# Patient Record
Sex: MALE | Race: BLACK OR AFRICAN AMERICAN | NOT HISPANIC OR LATINO | Employment: OTHER | URBAN - METROPOLITAN AREA
[De-identification: names, ages, dates, MRNs, and addresses within clinical notes are randomized per-mention and may not be internally consistent; named-entity substitution may affect disease eponyms.]

---

## 2017-02-08 ENCOUNTER — APPOINTMENT (OUTPATIENT)
Dept: LAB | Facility: HOSPITAL | Age: 72
End: 2017-02-08

## 2017-02-08 ENCOUNTER — APPOINTMENT (OUTPATIENT)
Dept: ONCOLOGY | Facility: CLINIC | Age: 72
End: 2017-02-08

## 2017-02-21 ENCOUNTER — OFFICE VISIT (OUTPATIENT)
Dept: ONCOLOGY | Facility: CLINIC | Age: 72
End: 2017-02-21

## 2017-02-21 ENCOUNTER — LAB (OUTPATIENT)
Dept: LAB | Facility: HOSPITAL | Age: 72
End: 2017-02-21

## 2017-02-21 VITALS
BODY MASS INDEX: 29.29 KG/M2 | DIASTOLIC BLOOD PRESSURE: 78 MMHG | WEIGHT: 204.6 LBS | RESPIRATION RATE: 16 BRPM | HEIGHT: 70 IN | TEMPERATURE: 97.7 F | OXYGEN SATURATION: 95 % | HEART RATE: 63 BPM | SYSTOLIC BLOOD PRESSURE: 126 MMHG

## 2017-02-21 DIAGNOSIS — C61 PROSTATE CANCER (HCC): ICD-10-CM

## 2017-02-21 DIAGNOSIS — D37.3 LOW GRADE MUCINOUS NEOPLASM OF APPENDIX: Primary | ICD-10-CM

## 2017-02-21 DIAGNOSIS — C18.1 CANCER OF APPENDIX (HCC): ICD-10-CM

## 2017-02-21 DIAGNOSIS — D37.3 LOW GRADE MUCINOUS NEOPLASM OF APPENDIX: ICD-10-CM

## 2017-02-21 DIAGNOSIS — D63.8 ANEMIA OF CHRONIC DISEASE: ICD-10-CM

## 2017-02-21 LAB
ALBUMIN SERPL-MCNC: 3.9 G/DL (ref 3.5–5.2)
ALBUMIN/GLOB SERPL: 1.4 G/DL (ref 1.1–2.4)
ALP SERPL-CCNC: 67 U/L (ref 38–116)
ALT SERPL W P-5'-P-CCNC: 15 U/L (ref 0–41)
ANION GAP SERPL CALCULATED.3IONS-SCNC: 12.6 MMOL/L
AST SERPL-CCNC: 19 U/L (ref 0–40)
BASOPHILS # BLD AUTO: 0.02 10*3/MM3 (ref 0–0.1)
BASOPHILS NFR BLD AUTO: 0.4 % (ref 0–1.1)
BILIRUB SERPL-MCNC: 0.7 MG/DL (ref 0.1–1.2)
BUN BLD-MCNC: 16 MG/DL (ref 6–20)
BUN/CREAT SERPL: 12 (ref 7.3–30)
CALCIUM SPEC-SCNC: 9.1 MG/DL (ref 8.5–10.2)
CEA SERPL-MCNC: 3.81 NG/ML
CHLORIDE SERPL-SCNC: 105 MMOL/L (ref 98–107)
CO2 SERPL-SCNC: 26.4 MMOL/L (ref 22–29)
CREAT BLD-MCNC: 1.33 MG/DL (ref 0.7–1.3)
DEPRECATED RDW RBC AUTO: 41.6 FL (ref 37–49)
EOSINOPHIL # BLD AUTO: 0.04 10*3/MM3 (ref 0–0.36)
EOSINOPHIL NFR BLD AUTO: 0.8 % (ref 1–5)
ERYTHROCYTE [DISTWIDTH] IN BLOOD BY AUTOMATED COUNT: 14.3 % (ref 11.7–14.5)
GFR SERPL CREATININE-BSD FRML MDRD: 65 ML/MIN/1.73
GLOBULIN UR ELPH-MCNC: 2.7 GM/DL (ref 1.8–3.5)
GLUCOSE BLD-MCNC: 131 MG/DL (ref 74–124)
HCT VFR BLD AUTO: 43.5 % (ref 40–49)
HGB BLD-MCNC: 14.2 G/DL (ref 13.5–16.5)
IMM GRANULOCYTES # BLD: 0.01 10*3/MM3 (ref 0–0.03)
IMM GRANULOCYTES NFR BLD: 0.2 % (ref 0–0.5)
LYMPHOCYTES # BLD AUTO: 1.15 10*3/MM3 (ref 1–3.5)
LYMPHOCYTES NFR BLD AUTO: 22 % (ref 20–49)
MCH RBC QN AUTO: 26.3 PG (ref 27–33)
MCHC RBC AUTO-ENTMCNC: 32.6 G/DL (ref 32–35)
MCV RBC AUTO: 80.6 FL (ref 83–97)
MONOCYTES # BLD AUTO: 0.3 10*3/MM3 (ref 0.25–0.8)
MONOCYTES NFR BLD AUTO: 5.7 % (ref 4–12)
NEUTROPHILS # BLD AUTO: 3.71 10*3/MM3 (ref 1.5–7)
NEUTROPHILS NFR BLD AUTO: 70.9 % (ref 39–75)
NRBC BLD MANUAL-RTO: 0 /100 WBC (ref 0–0)
PLATELET # BLD AUTO: 178 10*3/MM3 (ref 150–375)
PMV BLD AUTO: 10.8 FL (ref 8.9–12.1)
POTASSIUM BLD-SCNC: 4.2 MMOL/L (ref 3.5–4.7)
PROT SERPL-MCNC: 6.6 G/DL (ref 6.3–8)
PSA SERPL-MCNC: 0.65 NG/ML (ref 0–4)
RBC # BLD AUTO: 5.4 10*6/MM3 (ref 4.3–5.5)
SODIUM BLD-SCNC: 144 MMOL/L (ref 134–145)
WBC NRBC COR # BLD: 5.23 10*3/MM3 (ref 4–10)

## 2017-02-21 PROCEDURE — 82378 CARCINOEMBRYONIC ANTIGEN: CPT | Performed by: INTERNAL MEDICINE

## 2017-02-21 PROCEDURE — 84153 ASSAY OF PSA TOTAL: CPT | Performed by: INTERNAL MEDICINE

## 2017-02-21 PROCEDURE — 36415 COLL VENOUS BLD VENIPUNCTURE: CPT

## 2017-02-21 PROCEDURE — 80053 COMPREHEN METABOLIC PANEL: CPT

## 2017-02-21 PROCEDURE — 85025 COMPLETE CBC W/AUTO DIFF WBC: CPT

## 2017-02-21 PROCEDURE — 99214 OFFICE O/P EST MOD 30 MIN: CPT | Performed by: INTERNAL MEDICINE

## 2017-02-21 NOTE — PROGRESS NOTES
"Subjective     CHIEF COMPLAINT:    1. Appendiceal mucinous neoplasm.   2. Anemia.     HISTORY OF PRESENT ILLNESS:     Danni Figueroa is a 71 y.o. patient with the above medical history.  He returns today for follow up reporting no new symptoms.  He denies having abdominal pain, distension, nausea, vomiting or diarrhea.  He is on Prednisone 7.5 mg a day currently for treatment of his polymyalgia rheumatica, which was diagnosed in 2015.      Past Medical History   Diagnosis Date   • Hypertension    • Low grade mucinous neoplasm of appendix    • Prostate carcinoma        Past Surgical History   Procedure Laterality Date   • Appendectomy  1993       Cancer-related family history includes Prostate cancer in his brother.  Social History   Substance Use Topics   • Smoking status: Former Smoker     Packs/day: 1.00     Years: 10.00     Types: Cigarettes   • Smokeless tobacco: Not on file   • Alcohol use No       SCHEDULED MEDS:    Current Outpatient Prescriptions:   •  predniSONE (DELTASONE) 5 MG tablet, Take 5 mg by mouth 3 (three) times a day., Disp: , Rfl:       REVIEW OF SYSTEMS:  GENERAL:  No fever or chills. Intentional weight loss.    SKIN:  No rashes or lesions.  HEME/LYMPH: No anemia, easy bruisability or enlarged lymph nodes.  RESPIRATORY:  No cough, shortness of breath, hemoptysis or wheezing.  CVS:  No chest pain, palpitations or lower extremity swelling.  GI:  No abdominal pain, nausea, vomiting, constipation, diarrhea, melena or hematochezia.  :  No dysuria, hematuria or increased frequency.   MUSCULOSKELETAL:  Muscle pain and weakness improved with prednisone.  NEUROLOGICAL:  No dizziness, global weakness, loss of consciousness or seizures.  PSYCHIATRIC:  No anxiety, mood changes or depression.    Objective   VITAL SIGNS:     Vitals:    02/21/17 1536   BP: 126/78   Pulse: 63   Resp: 16   Temp: 97.7 °F (36.5 °C)   TempSrc: Oral   SpO2: 95%   Weight: 204 lb 9.6 oz (92.8 kg)   Height: 70.08\" (178 " cm)  Comment: new height   PainSc: 0-No pain     Body mass index is 29.29 kg/(m^2).   Wt Readings from Last 3 Encounters:   02/21/17 204 lb 9.6 oz (92.8 kg)   08/24/16 207 lb 6.4 oz (94.1 kg)       PHYSICAL EXAMINATION:  GENERAL:  The patient appears in good general condition, not in acute distress.  SKIN:  No skin rashes or lesions. No ecchymosis or petechiae.  HEAD:  Normocephalic.  NECK:  Supple with good ROM. No thyromegaly or masses.  LYMPHATICS:  No cervical or supraclavicular lymphadenopathy.  CHEST:  Lungs clear to auscultation. No added sounds.  ABDOMEN:  Soft and non-tender. No hepato- or splenomegaly. No masses.  EXTREMITIES:  No cyanosis or edema. No calf tenderness.  NEUROLOGICAL:  No focal neurological deficits.    DIAGNOSTIC DATA:       Results from last 7 days  Lab Units 02/21/17  1525   WBC 10*3/mm3 5.23   NEUTROS ABS 10*3/mm3 3.71   HEMOGLOBIN g/dL 14.2   HEMATOCRIT % 43.5   PLATELETS 10*3/mm3 178       Results from last 7 days  Lab Units 02/21/17  1525   SODIUM mmol/L 144   POTASSIUM mmol/L 4.2   CHLORIDE mmol/L 105   TOTAL CO2 mmol/L 26.4   BUN mg/dL 16   CREATININE mg/dL 1.33*   CALCIUM mg/dL 9.1   ALBUMIN g/dL 3.90   BILIRUBIN mg/dL 0.7   ALK PHOS U/L 67   ALT (SGPT) U/L 15   AST (SGOT) U/L 19   GLUCOSE mg/dL 131*         Lab Results   Component Value Date    CEA 3.2 08/24/2016     Assessment/Plan    1.  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 He had right hemicolectomy on 06/19/2014. His baseline CEA was elevated. He had thickening at the area of the right paracolic gutter concerning for possible local recurrence on his previous CT scans. The CT scan from 01/19/2016 revealed resolution of this finding.  Patient is asymptomatic.  His physical examination was nonrevealing.  CEA remains in the normal range.  It was elevated at the time of diagnosis and will continue to use it for follow-up.  We will continue to see him again in 6 months for the first 3 years and  then starts to see him annually afterwards.      2.  Chronic kidney disease stage III.  We discussed today the need to increase fluid intake..     3.  Prostate cancer diagnosed in 2014.  PSA has slightly increased on today's lab.  He is completely asymptomatic.  We will repeat the PSA in 6 months.     We will see him in follow up in 6 months with a CBC, CMP, CEA and PSA.       Katheryn Haider MD  02/21/17

## 2017-08-02 ENCOUNTER — DOCUMENTATION (OUTPATIENT)
Dept: ONCOLOGY | Facility: CLINIC | Age: 72
End: 2017-08-02

## 2017-08-02 ENCOUNTER — LAB (OUTPATIENT)
Dept: LAB | Facility: HOSPITAL | Age: 72
End: 2017-08-02

## 2017-08-02 DIAGNOSIS — D37.3 LOW GRADE MUCINOUS NEOPLASM OF APPENDIX: ICD-10-CM

## 2017-08-02 DIAGNOSIS — C61 PROSTATE CANCER (HCC): ICD-10-CM

## 2017-08-02 LAB
ALBUMIN SERPL-MCNC: 4 G/DL (ref 3.5–5.2)
ALBUMIN/GLOB SERPL: 1.5 G/DL (ref 1.1–2.4)
ALP SERPL-CCNC: 80 U/L (ref 38–116)
ALT SERPL W P-5'-P-CCNC: 14 U/L (ref 0–41)
ANION GAP SERPL CALCULATED.3IONS-SCNC: 12.2 MMOL/L
AST SERPL-CCNC: 18 U/L (ref 0–40)
BASOPHILS # BLD AUTO: 0.03 10*3/MM3 (ref 0–0.1)
BASOPHILS NFR BLD AUTO: 0.6 % (ref 0–1.1)
BILIRUB SERPL-MCNC: 0.5 MG/DL (ref 0.1–1.2)
BUN BLD-MCNC: 19 MG/DL (ref 6–20)
BUN/CREAT SERPL: 14 (ref 7.3–30)
CALCIUM SPEC-SCNC: 9 MG/DL (ref 8.5–10.2)
CEA SERPL-MCNC: 3.37 NG/ML
CHLORIDE SERPL-SCNC: 104 MMOL/L (ref 98–107)
CO2 SERPL-SCNC: 25.8 MMOL/L (ref 22–29)
CREAT BLD-MCNC: 1.36 MG/DL (ref 0.7–1.3)
DEPRECATED RDW RBC AUTO: 41.3 FL (ref 37–49)
EOSINOPHIL # BLD AUTO: 0.04 10*3/MM3 (ref 0–0.36)
EOSINOPHIL NFR BLD AUTO: 0.7 % (ref 1–5)
ERYTHROCYTE [DISTWIDTH] IN BLOOD BY AUTOMATED COUNT: 14.1 % (ref 11.7–14.5)
GFR SERPL CREATININE-BSD FRML MDRD: 63 ML/MIN/1.73
GLOBULIN UR ELPH-MCNC: 2.6 GM/DL (ref 1.8–3.5)
GLUCOSE BLD-MCNC: 99 MG/DL (ref 74–124)
HCT VFR BLD AUTO: 44.2 % (ref 40–49)
HGB BLD-MCNC: 14.5 G/DL (ref 13.5–16.5)
IMM GRANULOCYTES # BLD: 0.03 10*3/MM3 (ref 0–0.03)
IMM GRANULOCYTES NFR BLD: 0.6 % (ref 0–0.5)
LYMPHOCYTES # BLD AUTO: 1.09 10*3/MM3 (ref 1–3.5)
LYMPHOCYTES NFR BLD AUTO: 20 % (ref 20–49)
MCH RBC QN AUTO: 26.7 PG (ref 27–33)
MCHC RBC AUTO-ENTMCNC: 32.8 G/DL (ref 32–35)
MCV RBC AUTO: 81.3 FL (ref 83–97)
MONOCYTES # BLD AUTO: 0.3 10*3/MM3 (ref 0.25–0.8)
MONOCYTES NFR BLD AUTO: 5.5 % (ref 4–12)
NEUTROPHILS # BLD AUTO: 3.96 10*3/MM3 (ref 1.5–7)
NEUTROPHILS NFR BLD AUTO: 72.6 % (ref 39–75)
NRBC BLD MANUAL-RTO: 0 /100 WBC (ref 0–0)
PLATELET # BLD AUTO: 171 10*3/MM3 (ref 150–375)
PMV BLD AUTO: 10.6 FL (ref 8.9–12.1)
POTASSIUM BLD-SCNC: 4.6 MMOL/L (ref 3.5–4.7)
PROT SERPL-MCNC: 6.6 G/DL (ref 6.3–8)
PSA SERPL-MCNC: 0.48 NG/ML (ref 0–4)
RBC # BLD AUTO: 5.44 10*6/MM3 (ref 4.3–5.5)
SODIUM BLD-SCNC: 142 MMOL/L (ref 134–145)
WBC NRBC COR # BLD: 5.45 10*3/MM3 (ref 4–10)

## 2017-08-02 PROCEDURE — 85025 COMPLETE CBC W/AUTO DIFF WBC: CPT | Performed by: INTERNAL MEDICINE

## 2017-08-02 PROCEDURE — 82378 CARCINOEMBRYONIC ANTIGEN: CPT | Performed by: INTERNAL MEDICINE

## 2017-08-02 PROCEDURE — 80053 COMPREHEN METABOLIC PANEL: CPT | Performed by: INTERNAL MEDICINE

## 2017-08-02 PROCEDURE — 84153 ASSAY OF PSA TOTAL: CPT | Performed by: INTERNAL MEDICINE

## 2017-08-02 PROCEDURE — 36415 COLL VENOUS BLD VENIPUNCTURE: CPT | Performed by: INTERNAL MEDICINE

## 2017-08-02 NOTE — PROGRESS NOTES
Per [place]  patient requesting to have CMP, CEA and PSA drawn today in order for Dr Haider to have lab results on 8/8/17.

## 2017-08-08 ENCOUNTER — LAB (OUTPATIENT)
Dept: LAB | Facility: HOSPITAL | Age: 72
End: 2017-08-08

## 2017-08-08 ENCOUNTER — OFFICE VISIT (OUTPATIENT)
Dept: ONCOLOGY | Facility: CLINIC | Age: 72
End: 2017-08-08

## 2017-08-08 ENCOUNTER — APPOINTMENT (OUTPATIENT)
Dept: LAB | Facility: HOSPITAL | Age: 72
End: 2017-08-08

## 2017-08-08 ENCOUNTER — APPOINTMENT (OUTPATIENT)
Dept: ONCOLOGY | Facility: CLINIC | Age: 72
End: 2017-08-08

## 2017-08-08 VITALS
HEIGHT: 70 IN | TEMPERATURE: 97.4 F | BODY MASS INDEX: 30.01 KG/M2 | DIASTOLIC BLOOD PRESSURE: 84 MMHG | RESPIRATION RATE: 18 BRPM | SYSTOLIC BLOOD PRESSURE: 118 MMHG | OXYGEN SATURATION: 99 % | HEART RATE: 55 BPM | WEIGHT: 209.6 LBS

## 2017-08-08 DIAGNOSIS — C61 PROSTATE CANCER (HCC): ICD-10-CM

## 2017-08-08 DIAGNOSIS — D37.3 LOW GRADE MUCINOUS NEOPLASM OF APPENDIX: Primary | ICD-10-CM

## 2017-08-08 LAB
BASOPHILS # BLD AUTO: 0.03 10*3/MM3 (ref 0–0.1)
BASOPHILS NFR BLD AUTO: 0.5 % (ref 0–1.1)
DEPRECATED RDW RBC AUTO: 41.1 FL (ref 37–49)
EOSINOPHIL # BLD AUTO: 0.06 10*3/MM3 (ref 0–0.36)
EOSINOPHIL NFR BLD AUTO: 1 % (ref 1–5)
ERYTHROCYTE [DISTWIDTH] IN BLOOD BY AUTOMATED COUNT: 14.2 % (ref 11.7–14.5)
HCT VFR BLD AUTO: 43.6 % (ref 40–49)
HGB BLD-MCNC: 14.7 G/DL (ref 13.5–16.5)
IMM GRANULOCYTES # BLD: 0.04 10*3/MM3 (ref 0–0.03)
IMM GRANULOCYTES NFR BLD: 0.7 % (ref 0–0.5)
LYMPHOCYTES # BLD AUTO: 1.35 10*3/MM3 (ref 1–3.5)
LYMPHOCYTES NFR BLD AUTO: 22.7 % (ref 20–49)
MCH RBC QN AUTO: 27 PG (ref 27–33)
MCHC RBC AUTO-ENTMCNC: 33.7 G/DL (ref 32–35)
MCV RBC AUTO: 80 FL (ref 83–97)
MONOCYTES # BLD AUTO: 0.35 10*3/MM3 (ref 0.25–0.8)
MONOCYTES NFR BLD AUTO: 5.9 % (ref 4–12)
NEUTROPHILS # BLD AUTO: 4.13 10*3/MM3 (ref 1.5–7)
NEUTROPHILS NFR BLD AUTO: 69.2 % (ref 39–75)
NRBC BLD MANUAL-RTO: 0 /100 WBC (ref 0–0)
PLATELET # BLD AUTO: 156 10*3/MM3 (ref 150–375)
PMV BLD AUTO: 10.4 FL (ref 8.9–12.1)
RBC # BLD AUTO: 5.45 10*6/MM3 (ref 4.3–5.5)
WBC NRBC COR # BLD: 5.96 10*3/MM3 (ref 4–10)

## 2017-08-08 PROCEDURE — 36416 COLLJ CAPILLARY BLOOD SPEC: CPT | Performed by: INTERNAL MEDICINE

## 2017-08-08 PROCEDURE — 85025 COMPLETE CBC W/AUTO DIFF WBC: CPT | Performed by: INTERNAL MEDICINE

## 2017-08-08 PROCEDURE — 99213 OFFICE O/P EST LOW 20 MIN: CPT | Performed by: INTERNAL MEDICINE

## 2017-08-08 RX ORDER — TADALAFIL 5 MG/1
5 TABLET ORAL DAILY
COMMUNITY

## 2017-08-08 NOTE — PROGRESS NOTES
"Subjective     CHIEF COMPLAINT:    1. Appendiceal mucinous neoplasm.   2. Anemia.     HISTORY OF PRESENT ILLNESS:     Danni Figueroa is a 71 y.o. patient with the above medical history.  He returns today for follow up reporting no new symptoms.  He has polymyalgia rheumatica which is under good control with prednisone currently at 5 mg a day.  He denies having abdominal pain or changes in his bowel movements.    Past Medical History:   Diagnosis Date   • Hypertension    • Low grade mucinous neoplasm of appendix    • Prostate carcinoma        Past Surgical History:   Procedure Laterality Date   • APPENDECTOMY  1993       Cancer-related family history includes Prostate cancer in his brother.  Social History   Substance Use Topics   • Smoking status: Former Smoker     Packs/day: 1.00     Years: 10.00     Types: Cigarettes   • Smokeless tobacco: Not on file   • Alcohol use No       SCHEDULED MEDS:    Current Outpatient Prescriptions:   •  tadalafil (CIALIS) 5 MG tablet, Take 5 mg by mouth Daily As Needed for erectile dysfunction., Disp: , Rfl:   •  predniSONE (DELTASONE) 5 MG tablet, Take 5 mg by mouth Daily., Disp: , Rfl:       REVIEW OF SYSTEMS:  GENERAL:  No fever or chills. Intentional weight loss.    SKIN:  No rashes or lesions.  HEME/LYMPH: No anemia, easy bruisability or enlarged lymph nodes.  GI:  No abdominal pain, nausea, vomiting, constipation, diarrhea, melena or hematochezia.  :  No dysuria, hematuria or increased frequency.   MUSCULOSKELETAL:  Muscle pain and weakness improved with treatment of the polymyalgia rheumatica.      Objective   VITAL SIGNS:     Vitals:    08/08/17 0916   BP: 118/84   Pulse: 55   Resp: 18   Temp: 97.4 °F (36.3 °C)   TempSrc: Oral   SpO2: 99%   Weight: 209 lb 9.6 oz (95.1 kg)   Height: 70.08\" (178 cm)   PainSc: 0-No pain     Body mass index is 30.01 kg/(m^2).   Wt Readings from Last 3 Encounters:   08/08/17 209 lb 9.6 oz (95.1 kg)   02/21/17 204 lb 9.6 oz (92.8 kg)   08/24/16 " 207 lb 6.4 oz (94.1 kg)       PHYSICAL EXAMINATION:  GENERAL:  The patient appears in good general condition, not in acute distress.  SKIN:  No skin rashes or lesions. No ecchymosis or petechiae.  HEAD:  Normocephalic.  NECK:  Supple with good ROM. No thyromegaly or masses.  LYMPHATICS:  No cervical or supraclavicular lymphadenopathy.  CHEST:  Lungs clear to auscultation. No added sounds.  ABDOMEN:  Soft and non-tender. No hepato- or splenomegaly. No masses.      DIAGNOSTIC DATA:       Results from last 7 days  Lab Units 08/08/17  0910 08/02/17  1120   WBC 10*3/mm3 5.96 5.45   NEUTROS ABS 10*3/mm3 4.13 3.96   HEMOGLOBIN g/dL 14.7 14.5   HEMATOCRIT % 43.6 44.2   PLATELETS 10*3/mm3 156 171       Results from last 7 days  Lab Units 08/02/17  1120   SODIUM mmol/L 142   POTASSIUM mmol/L 4.6   CHLORIDE mmol/L 104   CO2 mmol/L 25.8   BUN mg/dL 19   CREATININE mg/dL 1.36*   CALCIUM mg/dL 9.0   ALBUMIN g/dL 4.00   BILIRUBIN mg/dL 0.5   ALK PHOS U/L 80   ALT (SGPT) U/L 14   AST (SGOT) U/L 18   GLUCOSE mg/dL 99         Lab Results   Component Value Date    CEA 3.37 08/02/2017     Assessment/Plan    1.  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 He had right hemicolectomy on 06/19/2014. His baseline CEA was elevated. He Initially had thickening at the area of the right paracolic gutter concerning for possible local recurrence. The CT scan from 01/19/2016 revealed resolution of this finding.  He is completely asymptomatic.  His physical exam is normal. His CEA remains normal.    It was elevated at the time of diagnosis and will continue to use it for follow-up.  We will see him again in 6 months for follow-up.    2.  Prostate cancer diagnosed in 2014.  PSA was slightly increased 6 months ago when he treats 0.649 but it never decreased to 0.480 on 8/2/17.  He is completely asymptomatic.      We will see him in follow up in 6 months with a CBC, CMP, CEA and PSA.  If he is doing well at that point,  we'll start seeing him on an annual basis.         Katheryn Haider MD  08/08/17

## 2018-01-30 ENCOUNTER — LAB (OUTPATIENT)
Dept: LAB | Facility: HOSPITAL | Age: 73
End: 2018-01-30

## 2018-01-30 DIAGNOSIS — D37.3 LOW GRADE MUCINOUS NEOPLASM OF APPENDIX: ICD-10-CM

## 2018-01-30 DIAGNOSIS — C61 PROSTATE CANCER (HCC): ICD-10-CM

## 2018-01-30 LAB
BASOPHILS # BLD AUTO: 0.03 10*3/MM3 (ref 0–0.1)
BASOPHILS NFR BLD AUTO: 0.5 % (ref 0–1.1)
CEA SERPL-MCNC: 3.41 NG/ML
DEPRECATED RDW RBC AUTO: 41.5 FL (ref 37–49)
EOSINOPHIL # BLD AUTO: 0.06 10*3/MM3 (ref 0–0.36)
EOSINOPHIL NFR BLD AUTO: 1 % (ref 1–5)
ERYTHROCYTE [DISTWIDTH] IN BLOOD BY AUTOMATED COUNT: 13.9 % (ref 11.7–14.5)
HCT VFR BLD AUTO: 45.6 % (ref 40–49)
HGB BLD-MCNC: 14.6 G/DL (ref 13.5–16.5)
IMM GRANULOCYTES # BLD: 0.02 10*3/MM3 (ref 0–0.03)
IMM GRANULOCYTES NFR BLD: 0.3 % (ref 0–0.5)
LYMPHOCYTES # BLD AUTO: 0.97 10*3/MM3 (ref 1–3.5)
LYMPHOCYTES NFR BLD AUTO: 16.6 % (ref 20–49)
MCH RBC QN AUTO: 26.4 PG (ref 27–33)
MCHC RBC AUTO-ENTMCNC: 32 G/DL (ref 32–35)
MCV RBC AUTO: 82.5 FL (ref 83–97)
MONOCYTES # BLD AUTO: 0.41 10*3/MM3 (ref 0.25–0.8)
MONOCYTES NFR BLD AUTO: 7 % (ref 4–12)
NEUTROPHILS # BLD AUTO: 4.36 10*3/MM3 (ref 1.5–7)
NEUTROPHILS NFR BLD AUTO: 74.6 % (ref 39–75)
NRBC BLD MANUAL-RTO: 0 /100 WBC (ref 0–0)
PLATELET # BLD AUTO: 180 10*3/MM3 (ref 150–375)
PMV BLD AUTO: 10.7 FL (ref 8.9–12.1)
PSA SERPL-MCNC: 1.46 NG/ML (ref 0–4)
RBC # BLD AUTO: 5.53 10*6/MM3 (ref 4.3–5.5)
WBC NRBC COR # BLD: 5.85 10*3/MM3 (ref 4–10)

## 2018-01-30 PROCEDURE — 85025 COMPLETE CBC W/AUTO DIFF WBC: CPT | Performed by: INTERNAL MEDICINE

## 2018-01-30 PROCEDURE — 84153 ASSAY OF PSA TOTAL: CPT | Performed by: INTERNAL MEDICINE

## 2018-01-30 PROCEDURE — 82378 CARCINOEMBRYONIC ANTIGEN: CPT | Performed by: INTERNAL MEDICINE

## 2018-01-30 PROCEDURE — 36415 COLL VENOUS BLD VENIPUNCTURE: CPT | Performed by: INTERNAL MEDICINE

## 2018-02-06 ENCOUNTER — APPOINTMENT (OUTPATIENT)
Dept: LAB | Facility: HOSPITAL | Age: 73
End: 2018-02-06

## 2018-02-06 ENCOUNTER — APPOINTMENT (OUTPATIENT)
Dept: ONCOLOGY | Facility: CLINIC | Age: 73
End: 2018-02-06

## 2018-02-06 ENCOUNTER — LAB (OUTPATIENT)
Dept: LAB | Facility: HOSPITAL | Age: 73
End: 2018-02-06

## 2018-02-06 ENCOUNTER — OFFICE VISIT (OUTPATIENT)
Dept: ONCOLOGY | Facility: CLINIC | Age: 73
End: 2018-02-06

## 2018-02-06 VITALS
TEMPERATURE: 97.7 F | HEIGHT: 70 IN | BODY MASS INDEX: 29.55 KG/M2 | RESPIRATION RATE: 12 BRPM | WEIGHT: 206.4 LBS | OXYGEN SATURATION: 100 % | DIASTOLIC BLOOD PRESSURE: 72 MMHG | SYSTOLIC BLOOD PRESSURE: 116 MMHG | HEART RATE: 55 BPM

## 2018-02-06 DIAGNOSIS — D37.3 LOW GRADE MUCINOUS NEOPLASM OF APPENDIX: Primary | ICD-10-CM

## 2018-02-06 DIAGNOSIS — D37.3 LOW GRADE MUCINOUS NEOPLASM OF APPENDIX: ICD-10-CM

## 2018-02-06 DIAGNOSIS — C18.1 CANCER OF APPENDIX (HCC): ICD-10-CM

## 2018-02-06 DIAGNOSIS — M35.3 POLYMYALGIA RHEUMATICA (HCC): ICD-10-CM

## 2018-02-06 DIAGNOSIS — C61 PROSTATE CANCER (HCC): Primary | ICD-10-CM

## 2018-02-06 LAB
BASOPHILS # BLD AUTO: 0.02 10*3/MM3 (ref 0–0.1)
BASOPHILS NFR BLD AUTO: 0.4 % (ref 0–1.1)
DEPRECATED RDW RBC AUTO: 42.2 FL (ref 37–49)
EOSINOPHIL # BLD AUTO: 0.07 10*3/MM3 (ref 0–0.36)
EOSINOPHIL NFR BLD AUTO: 1.4 % (ref 1–5)
ERYTHROCYTE [DISTWIDTH] IN BLOOD BY AUTOMATED COUNT: 14.1 % (ref 11.7–14.5)
HCT VFR BLD AUTO: 45.1 % (ref 40–49)
HGB BLD-MCNC: 14.6 G/DL (ref 13.5–16.5)
IMM GRANULOCYTES # BLD: 0.02 10*3/MM3 (ref 0–0.03)
IMM GRANULOCYTES NFR BLD: 0.4 % (ref 0–0.5)
LYMPHOCYTES # BLD AUTO: 1.11 10*3/MM3 (ref 1–3.5)
LYMPHOCYTES NFR BLD AUTO: 22.3 % (ref 20–49)
MCH RBC QN AUTO: 26.8 PG (ref 27–33)
MCHC RBC AUTO-ENTMCNC: 32.4 G/DL (ref 32–35)
MCV RBC AUTO: 82.9 FL (ref 83–97)
MONOCYTES # BLD AUTO: 0.5 10*3/MM3 (ref 0.25–0.8)
MONOCYTES NFR BLD AUTO: 10 % (ref 4–12)
NEUTROPHILS # BLD AUTO: 3.26 10*3/MM3 (ref 1.5–7)
NEUTROPHILS NFR BLD AUTO: 65.5 % (ref 39–75)
NRBC BLD MANUAL-RTO: 0 /100 WBC (ref 0–0)
PLATELET # BLD AUTO: 168 10*3/MM3 (ref 150–375)
PMV BLD AUTO: 10.5 FL (ref 8.9–12.1)
RBC # BLD AUTO: 5.44 10*6/MM3 (ref 4.3–5.5)
WBC NRBC COR # BLD: 4.98 10*3/MM3 (ref 4–10)

## 2018-02-06 PROCEDURE — 36415 COLL VENOUS BLD VENIPUNCTURE: CPT | Performed by: INTERNAL MEDICINE

## 2018-02-06 PROCEDURE — 99213 OFFICE O/P EST LOW 20 MIN: CPT | Performed by: INTERNAL MEDICINE

## 2018-02-06 NOTE — PROGRESS NOTES
"Subjective     CHIEF COMPLAINT:    1. Appendiceal mucinous neoplasm.   2. Anemia.     HISTORY OF PRESENT ILLNESS:     Danni Figueroa is a 72 y.o. patient with the above medical history.  He returns today for follow-up.  He continues to feel well.  He is not experiencing any abdominal pain.  No change in the bowel movements.  No blood in the stool.    The patient's PSA level has increased over the past 6 months.  He is not having dysuria or increased frequency.  He has follow-up with his urologist soon.         Past Medical History:   Diagnosis Date   • History of alcohol abuse    • Hypertension    • Low grade mucinous neoplasm of appendix    • Prostate carcinoma        Past Surgical History:   Procedure Laterality Date   • APPENDECTOMY  1993   • COLON SURGERY  06/2014    Right hemicolectomy-Pericecal mass   • COLONOSCOPY         Cancer-related family history includes Prostate cancer in his brother.  Social History   Substance Use Topics   • Smoking status: Former Smoker     Packs/day: 1.00     Years: 10.00     Types: Cigarettes   • Smokeless tobacco: Not on file   • Alcohol use No      Comment: 29 year former alcoholic        SCHEDULED MEDS:    Current Outpatient Prescriptions:   •  predniSONE (DELTASONE) 5 MG tablet, Take 5 mg by mouth Daily., Disp: , Rfl:   •  tadalafil (CIALIS) 5 MG tablet, Take 5 mg by mouth Daily As Needed for erectile dysfunction., Disp: , Rfl:       REVIEW OF SYSTEMS:  GENERAL:  No weight loss.  SKIN: No skin rash.  HEME/LYMPH: No anemia.  GI:  No abdominal pain,diarrhea, constipation hematochezia or melena.   : No dysuria, increase in frequency or hematuria.    MUSCULOSKELETAL: Patient has polymyalgia rheumatica..           Objective   VITAL SIGNS:     Vitals:    02/06/18 1101   BP: 116/72   Pulse: 55   Resp: 12   Temp: 97.7 °F (36.5 °C)   TempSrc: Oral   SpO2: 100%   Weight: 93.6 kg (206 lb 6.4 oz)   Height: 178 cm (70.08\")   PainSc: 0-No pain     Body mass index is 29.55 kg/(m^2). "   Wt Readings from Last 3 Encounters:   02/06/18 93.6 kg (206 lb 6.4 oz)   08/08/17 95.1 kg (209 lb 9.6 oz)   02/21/17 92.8 kg (204 lb 9.6 oz)       PHYSICAL EXAMINATION:  GENERAL:  Patient appears in good general condition not in acute distress.  SKIN:  No skin rashes or lesions  ABDOMEN: Soft nontender with no hepatosplenomegaly.  No masses.  EXTREMITIES:  No edema.      DIAGNOSTIC DATA:       Results from last 7 days  Lab Units 02/06/18  1045 01/30/18  1222   WBC 10*3/mm3 4.98 5.85   NEUTROS ABS 10*3/mm3 3.26 4.36   HEMOGLOBIN g/dL 14.6 14.6   HEMATOCRIT % 45.1 45.6   PLATELETS 10*3/mm3 168 180             Lab Results   Component Value Date    CEA 3.41 01/30/2018     Component      Latest Ref Rng & Units 6/22/2014 2/21/2017 8/2/2017 1/30/2018   CEA      ng/mL 8.9 3.81 3.37 3.41   PSA      0.000 - 4.000 ng/mL  0.649 0.480 1.460       Assessment/Plan    1.  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   · He had right hemicolectomy on 06/19/2014. His baseline CEA was elevated at 8.9 on 6/22/14.   · He Initially had thickening at the area of the right paracolic gutter concerning for possible local recurrence. The CT scan from 01/19/2016 revealed resolution of this finding.     He continues to do well.  CEA level remains in the 3 range.  There is no clinical evidence of recurrence.      2.  Prostate cancer diagnosed in 2014.  PSA Increased to 1.460 on 1/30/18.  We will forward this result to his urologist.  He has follow-up with him soon.    We will see the patient in follow-up in 6 months with a CBC, CEA and PSA one week before his return visit.  If he continues to do well, we'll start to see him on an annual basis.      Katheryn Haider MD  02/06/18

## 2018-06-05 ENCOUNTER — TRANSCRIBE ORDERS (OUTPATIENT)
Dept: ADMINISTRATIVE | Facility: HOSPITAL | Age: 73
End: 2018-06-05

## 2018-06-05 ENCOUNTER — HOSPITAL ENCOUNTER (OUTPATIENT)
Dept: GENERAL RADIOLOGY | Facility: HOSPITAL | Age: 73
Discharge: HOME | End: 2018-06-05
Admitting: INTERNAL MEDICINE

## 2018-06-05 DIAGNOSIS — M25.562 ACUTE PAIN OF LEFT KNEE: ICD-10-CM

## 2018-06-05 DIAGNOSIS — M25.562 ACUTE PAIN OF LEFT KNEE: Primary | ICD-10-CM

## 2018-06-05 PROCEDURE — 73560 X-RAY EXAM OF KNEE 1 OR 2: CPT

## 2018-08-14 ENCOUNTER — LAB (OUTPATIENT)
Dept: LAB | Facility: HOSPITAL | Age: 73
End: 2018-08-14

## 2018-08-14 DIAGNOSIS — M35.3 POLYMYALGIA RHEUMATICA (HCC): Primary | ICD-10-CM

## 2018-08-14 DIAGNOSIS — C61 PROSTATE CANCER (HCC): ICD-10-CM

## 2018-08-14 DIAGNOSIS — D37.3 LOW GRADE MUCINOUS NEOPLASM OF APPENDIX: ICD-10-CM

## 2018-08-14 DIAGNOSIS — C18.1 CANCER OF APPENDIX (HCC): ICD-10-CM

## 2018-08-14 LAB
ALBUMIN SERPL-MCNC: 4 G/DL (ref 3.5–5.2)
ALBUMIN/GLOB SERPL: 1.4 G/DL (ref 1.1–2.4)
ALP SERPL-CCNC: 61 U/L (ref 38–116)
ALT SERPL W P-5'-P-CCNC: 12 U/L (ref 0–41)
ANION GAP SERPL CALCULATED.3IONS-SCNC: 11.9 MMOL/L
AST SERPL-CCNC: 18 U/L (ref 0–40)
BASOPHILS # BLD AUTO: 0.01 10*3/MM3 (ref 0–0.1)
BASOPHILS NFR BLD AUTO: 0.2 % (ref 0–1.1)
BILIRUB SERPL-MCNC: 0.7 MG/DL (ref 0.1–1.2)
BUN BLD-MCNC: 18 MG/DL (ref 6–20)
BUN/CREAT SERPL: 14.1 (ref 7.3–30)
CALCIUM SPEC-SCNC: 9.2 MG/DL (ref 8.5–10.2)
CEA SERPL-MCNC: 3.46 NG/ML
CHLORIDE SERPL-SCNC: 108 MMOL/L (ref 98–107)
CO2 SERPL-SCNC: 24.1 MMOL/L (ref 22–29)
CREAT BLD-MCNC: 1.28 MG/DL (ref 0.7–1.3)
DEPRECATED RDW RBC AUTO: 41.9 FL (ref 37–49)
EOSINOPHIL # BLD AUTO: 0.03 10*3/MM3 (ref 0–0.36)
EOSINOPHIL NFR BLD AUTO: 0.6 % (ref 1–5)
ERYTHROCYTE [DISTWIDTH] IN BLOOD BY AUTOMATED COUNT: 14.4 % (ref 11.7–14.5)
GFR SERPL CREATININE-BSD FRML MDRD: 67 ML/MIN/1.73
GLOBULIN UR ELPH-MCNC: 2.8 GM/DL (ref 1.8–3.5)
GLUCOSE BLD-MCNC: 88 MG/DL (ref 74–124)
HCT VFR BLD AUTO: 42.7 % (ref 40–49)
HGB BLD-MCNC: 13.9 G/DL (ref 13.5–16.5)
IMM GRANULOCYTES # BLD: 0.01 10*3/MM3 (ref 0–0.03)
IMM GRANULOCYTES NFR BLD: 0.2 % (ref 0–0.5)
LYMPHOCYTES # BLD AUTO: 1.04 10*3/MM3 (ref 1–3.5)
LYMPHOCYTES NFR BLD AUTO: 22.5 % (ref 20–49)
MCH RBC QN AUTO: 26.5 PG (ref 27–33)
MCHC RBC AUTO-ENTMCNC: 32.6 G/DL (ref 32–35)
MCV RBC AUTO: 81.3 FL (ref 83–97)
MONOCYTES # BLD AUTO: 0.28 10*3/MM3 (ref 0.25–0.8)
MONOCYTES NFR BLD AUTO: 6 % (ref 4–12)
NEUTROPHILS # BLD AUTO: 3.26 10*3/MM3 (ref 1.5–7)
NEUTROPHILS NFR BLD AUTO: 70.5 % (ref 39–75)
NRBC BLD MANUAL-RTO: 0 /100 WBC (ref 0–0)
PLATELET # BLD AUTO: 186 10*3/MM3 (ref 150–375)
PMV BLD AUTO: 10.9 FL (ref 8.9–12.1)
POTASSIUM BLD-SCNC: 4.6 MMOL/L (ref 3.5–4.7)
PROT SERPL-MCNC: 6.8 G/DL (ref 6.3–8)
PSA SERPL-MCNC: 0.44 NG/ML (ref 0–4)
RBC # BLD AUTO: 5.25 10*6/MM3 (ref 4.3–5.5)
SODIUM BLD-SCNC: 144 MMOL/L (ref 134–145)
WBC NRBC COR # BLD: 4.63 10*3/MM3 (ref 4–10)

## 2018-08-14 PROCEDURE — 82378 CARCINOEMBRYONIC ANTIGEN: CPT | Performed by: INTERNAL MEDICINE

## 2018-08-14 PROCEDURE — 80053 COMPREHEN METABOLIC PANEL: CPT | Performed by: INTERNAL MEDICINE

## 2018-08-14 PROCEDURE — 84153 ASSAY OF PSA TOTAL: CPT | Performed by: INTERNAL MEDICINE

## 2018-08-14 PROCEDURE — 36415 COLL VENOUS BLD VENIPUNCTURE: CPT | Performed by: INTERNAL MEDICINE

## 2018-08-14 PROCEDURE — 85025 COMPLETE CBC W/AUTO DIFF WBC: CPT | Performed by: INTERNAL MEDICINE

## 2018-08-21 ENCOUNTER — OFFICE VISIT (OUTPATIENT)
Dept: ONCOLOGY | Facility: CLINIC | Age: 73
End: 2018-08-21

## 2018-08-21 ENCOUNTER — APPOINTMENT (OUTPATIENT)
Dept: LAB | Facility: HOSPITAL | Age: 73
End: 2018-08-21

## 2018-08-21 VITALS
OXYGEN SATURATION: 99 % | TEMPERATURE: 97.7 F | BODY MASS INDEX: 29.78 KG/M2 | WEIGHT: 208 LBS | SYSTOLIC BLOOD PRESSURE: 130 MMHG | HEART RATE: 51 BPM | HEIGHT: 70 IN | RESPIRATION RATE: 16 BRPM | DIASTOLIC BLOOD PRESSURE: 80 MMHG

## 2018-08-21 DIAGNOSIS — D37.3 LOW GRADE MUCINOUS NEOPLASM OF APPENDIX: Primary | ICD-10-CM

## 2018-08-21 DIAGNOSIS — C61 PROSTATE CANCER (HCC): ICD-10-CM

## 2018-08-21 PROCEDURE — 99213 OFFICE O/P EST LOW 20 MIN: CPT | Performed by: INTERNAL MEDICINE

## 2018-08-21 PROCEDURE — G0463 HOSPITAL OUTPT CLINIC VISIT: HCPCS | Performed by: INTERNAL MEDICINE

## 2018-08-21 NOTE — PROGRESS NOTES
"Subjective     CHIEF COMPLAINT:    1. Appendiceal mucinous neoplasm.   2. Anemia.     HISTORY OF PRESENT ILLNESS:     Danni Figueroa is a 72 y.o. patient with the above medical history.  Returns today for follow-up and continues to feel good.  No abdominal pain.  No abdominal distention.  No blood in the stool.        Past Medical History:   Diagnosis Date   • History of alcohol abuse    • Hypertension    • Low grade mucinous neoplasm of appendix    • Prostate carcinoma (CMS/HCC)        Past Surgical History:   Procedure Laterality Date   • APPENDECTOMY  1993   • COLON SURGERY  06/2014    Right hemicolectomy-Pericecal mass   • COLONOSCOPY         Cancer-related family history includes Prostate cancer in his brother.  Social History   Substance Use Topics   • Smoking status: Former Smoker     Packs/day: 1.00     Years: 10.00     Types: Cigarettes   • Smokeless tobacco: Not on file   • Alcohol use No      Comment: 31 year former alcoholic        SCHEDULED MEDS:    Current Outpatient Prescriptions:   •  predniSONE (DELTASONE) 5 MG tablet, Take 5 mg by mouth Daily., Disp: , Rfl:   •  tadalafil (CIALIS) 5 MG tablet, Take 5 mg by mouth Daily As Needed for erectile dysfunction., Disp: , Rfl:       REVIEW OF SYSTEMS:  GENERAL:  No Fever or chills. No weight change.  No Fatigue.   SKIN:  No skin rashes or lesions.  GI:  No Abdominal pain. No Nausea. No Vomiting. No Constipation. No Diarrhea. No Melena. No Hematochezia.  :  No Hematuria. No Dysuria. No Increased frequency.   MUSCULOSKELETAL:  No Back pain.            Objective   VITAL SIGNS:     Vitals:    08/21/18 1050   BP: 130/80   Pulse: 51   Resp: 16   Temp: 97.7 °F (36.5 °C)   SpO2: 99%   Weight: 94.3 kg (208 lb)   Height: 178 cm (70.08\")   PainSc: 0-No pain     Body mass index is 29.78 kg/m².   Wt Readings from Last 3 Encounters:   08/21/18 94.3 kg (208 lb)   02/06/18 93.6 kg (206 lb 6.4 oz)   08/08/17 95.1 kg (209 lb 9.6 oz)       PHYSICAL EXAMINATION:  GENERAL:  " The patient appears in good general condition, not in acute distress.  SKIN:  No skin rashes or lesions. No Ecchymosis or Petechiae.  HEAD:  Normocephalic.  EYES:  No Pallor. No Jaundice.   NECK:  Supple with no Thyromegaly or Masses.  ABDOMEN:  Soft. No tenderness. No Hepatomegaly. No Splenomegaly. No masses.  EXTREMITIES:  No Edema.  NEUROLOGICAL:  No Focal neurological deficits.       DIAGNOSTIC DATA:       Results from last 7 days  Lab Units 08/14/18  1216   WBC 10*3/mm3 4.63   NEUTROS ABS 10*3/mm3 3.26   HEMOGLOBIN g/dL 13.9   HEMATOCRIT % 42.7   PLATELETS 10*3/mm3 186       Results from last 7 days  Lab Units 08/14/18  1216   SODIUM mmol/L 144   POTASSIUM mmol/L 4.6   CHLORIDE mmol/L 108*   CO2 mmol/L 24.1   BUN mg/dL 18   CREATININE mg/dL 1.28   CALCIUM mg/dL 9.2   ALBUMIN g/dL 4.00   BILIRUBIN mg/dL 0.7   ALK PHOS U/L 61   ALT (SGPT) U/L 12   AST (SGOT) U/L 18   GLUCOSE mg/dL 88             Lab Results   Component Value Date    CEA 3.46 08/14/2018    CEA 3.41 01/30/2018    CEA 3.37 08/02/2017    CEA 3.81 02/21/2017    CEA 3.2 08/24/2016     Lab Results   Component Value Date    PSA 0.436 08/14/2018    PSA 1.460 01/30/2018    PSA 0.480 08/02/2017           Assessment/Plan    1.  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   · He had right hemicolectomy on 06/19/2014. His baseline CEA was elevated at 8.9 on 6/22/14.   · He Initially had thickening at the area of the right paracolic gutter concerning for possible local recurrence. The CT scan from 01/19/2016 revealed resolution of this finding.     He continues to do well.  CEA level remains in the 3 range.  There is no clinical evidence of recurrence.  Not have any symptoms on today's visit.      2.  Prostate cancer diagnosed in 2014.  PSA Increased to 1.460 on 1/30/18 improved afterwards and is now down to 0.436      PLAN:    We will continue to monitor.  I recommended increasing the time interval between the visits.  I  recommended a follow-up in one year with a CBC, CMP, CEA and PSA one week before his return visit.    Katheryn Haider MD  08/21/18

## 2019-12-13 ENCOUNTER — LAB (OUTPATIENT)
Dept: LAB | Facility: HOSPITAL | Age: 74
End: 2019-12-13

## 2019-12-13 DIAGNOSIS — C61 PROSTATE CANCER (HCC): Primary | ICD-10-CM

## 2019-12-13 DIAGNOSIS — C18.1 CANCER OF APPENDIX (HCC): ICD-10-CM

## 2019-12-13 LAB
ALBUMIN SERPL-MCNC: 4 G/DL (ref 3.5–5.2)
ALBUMIN/GLOB SERPL: 1.5 G/DL (ref 1.1–2.4)
ALP SERPL-CCNC: 83 U/L (ref 38–116)
ALT SERPL W P-5'-P-CCNC: 13 U/L (ref 0–41)
ANION GAP SERPL CALCULATED.3IONS-SCNC: 9.9 MMOL/L (ref 5–15)
AST SERPL-CCNC: 20 U/L (ref 0–40)
BASOPHILS # BLD AUTO: 0.03 10*3/MM3 (ref 0–0.2)
BASOPHILS NFR BLD AUTO: 0.7 % (ref 0–1.5)
BILIRUB SERPL-MCNC: 0.5 MG/DL (ref 0.2–1.2)
BUN BLD-MCNC: 14 MG/DL (ref 6–20)
BUN/CREAT SERPL: 11.7 (ref 7.3–30)
CALCIUM SPEC-SCNC: 8.9 MG/DL (ref 8.5–10.2)
CEA SERPL-MCNC: 4.22 NG/ML
CHLORIDE SERPL-SCNC: 106 MMOL/L (ref 98–107)
CO2 SERPL-SCNC: 28.1 MMOL/L (ref 22–29)
CREAT BLD-MCNC: 1.2 MG/DL (ref 0.7–1.3)
DEPRECATED RDW RBC AUTO: 42.1 FL (ref 37–54)
EOSINOPHIL # BLD AUTO: 0.07 10*3/MM3 (ref 0–0.4)
EOSINOPHIL NFR BLD AUTO: 1.5 % (ref 0.3–6.2)
ERYTHROCYTE [DISTWIDTH] IN BLOOD BY AUTOMATED COUNT: 13.8 % (ref 12.3–15.4)
GFR SERPL CREATININE-BSD FRML MDRD: 72 ML/MIN/1.73
GLOBULIN UR ELPH-MCNC: 2.6 GM/DL (ref 1.8–3.5)
GLUCOSE BLD-MCNC: 100 MG/DL (ref 74–124)
HCT VFR BLD AUTO: 44.9 % (ref 37.5–51)
HGB BLD-MCNC: 14.1 G/DL (ref 13–17.7)
IMM GRANULOCYTES # BLD AUTO: 0.02 10*3/MM3 (ref 0–0.05)
IMM GRANULOCYTES NFR BLD AUTO: 0.4 % (ref 0–0.5)
LYMPHOCYTES # BLD AUTO: 1.13 10*3/MM3 (ref 0.7–3.1)
LYMPHOCYTES NFR BLD AUTO: 24.7 % (ref 19.6–45.3)
MCH RBC QN AUTO: 26.3 PG (ref 26.6–33)
MCHC RBC AUTO-ENTMCNC: 31.4 G/DL (ref 31.5–35.7)
MCV RBC AUTO: 83.6 FL (ref 79–97)
MONOCYTES # BLD AUTO: 0.38 10*3/MM3 (ref 0.1–0.9)
MONOCYTES NFR BLD AUTO: 8.3 % (ref 5–12)
NEUTROPHILS # BLD AUTO: 2.95 10*3/MM3 (ref 1.7–7)
NEUTROPHILS NFR BLD AUTO: 64.4 % (ref 42.7–76)
NRBC BLD AUTO-RTO: 0 /100 WBC (ref 0–0.2)
PLATELET # BLD AUTO: 163 10*3/MM3 (ref 140–450)
PMV BLD AUTO: 10.1 FL (ref 6–12)
POTASSIUM BLD-SCNC: 4.5 MMOL/L (ref 3.5–4.7)
PROT SERPL-MCNC: 6.6 G/DL (ref 6.3–8)
PSA SERPL-MCNC: 0.32 NG/ML (ref 0–4)
RBC # BLD AUTO: 5.37 10*6/MM3 (ref 4.14–5.8)
SODIUM BLD-SCNC: 144 MMOL/L (ref 134–145)
WBC NRBC COR # BLD: 4.58 10*3/MM3 (ref 3.4–10.8)

## 2019-12-13 PROCEDURE — 36415 COLL VENOUS BLD VENIPUNCTURE: CPT

## 2019-12-13 PROCEDURE — 85025 COMPLETE CBC W/AUTO DIFF WBC: CPT

## 2019-12-13 PROCEDURE — 82378 CARCINOEMBRYONIC ANTIGEN: CPT | Performed by: INTERNAL MEDICINE

## 2019-12-13 PROCEDURE — 84153 ASSAY OF PSA TOTAL: CPT | Performed by: INTERNAL MEDICINE

## 2019-12-13 PROCEDURE — 80053 COMPREHEN METABOLIC PANEL: CPT

## 2019-12-20 ENCOUNTER — OFFICE VISIT (OUTPATIENT)
Dept: ONCOLOGY | Facility: CLINIC | Age: 74
End: 2019-12-20

## 2019-12-20 ENCOUNTER — APPOINTMENT (OUTPATIENT)
Dept: LAB | Facility: HOSPITAL | Age: 74
End: 2019-12-20

## 2019-12-20 VITALS
DIASTOLIC BLOOD PRESSURE: 87 MMHG | WEIGHT: 201.7 LBS | OXYGEN SATURATION: 99 % | RESPIRATION RATE: 16 BRPM | SYSTOLIC BLOOD PRESSURE: 131 MMHG | HEIGHT: 70 IN | BODY MASS INDEX: 28.88 KG/M2 | TEMPERATURE: 98.3 F | HEART RATE: 75 BPM

## 2019-12-20 DIAGNOSIS — D37.3 LOW GRADE MUCINOUS NEOPLASM OF APPENDIX: Primary | ICD-10-CM

## 2019-12-20 DIAGNOSIS — C61 PROSTATE CANCER (HCC): ICD-10-CM

## 2019-12-20 DIAGNOSIS — R97.8 ELEVATED TUMOR MARKERS: ICD-10-CM

## 2019-12-20 PROCEDURE — 99214 OFFICE O/P EST MOD 30 MIN: CPT | Performed by: INTERNAL MEDICINE

## 2019-12-20 PROCEDURE — G0463 HOSPITAL OUTPT CLINIC VISIT: HCPCS | Performed by: INTERNAL MEDICINE

## 2019-12-20 NOTE — PROGRESS NOTES
Subjective     CHIEF COMPLAINT:      Chief Complaint   Patient presents with   • Annual Exam     no concerns       HISTORY OF PRESENT ILLNESS:     Danni Figueroa is a 74 y.o. male patient who returns today for follow up on his appendiceal tumor and prostate cancer.  He is feeling well.  He is not having abdominal pain or distention.  No diarrhea or constipation.  No blood in the stool.        REVIEW OF SYSTEMS:  Review of Systems   Constitutional: Negative for chills, fever and unexpected weight change.   HENT: Negative for mouth sores, nosebleeds, sore throat and voice change.    Eyes: Negative for visual disturbance.   Respiratory: Negative for cough and shortness of breath.    Cardiovascular: Negative for chest pain and leg swelling.   Gastrointestinal: Negative for abdominal pain, blood in stool, constipation, diarrhea, nausea and vomiting.   Genitourinary: Negative for dysuria, frequency and hematuria.   Musculoskeletal: Negative for arthralgias, back pain and joint swelling.   Skin: Negative for rash.   Neurological: Negative for dizziness, numbness and headaches.   Hematological: Negative for adenopathy. Does not bruise/bleed easily.   Psychiatric/Behavioral: Negative for dysphoric mood. The patient is not nervous/anxious.      I verified the ROS obtained by the MA.      Past Medical History:   Diagnosis Date   • History of alcohol abuse    • Hypertension    • Low grade mucinous neoplasm of appendix    • Prostate carcinoma (CMS/HCC)        Past Surgical History:   Procedure Laterality Date   • APPENDECTOMY  1993   • COLON SURGERY  06/2014    Right hemicolectomy-Pericecal mass   • COLONOSCOPY         Cancer-related family history includes Prostate cancer in his brother.  Social History     Tobacco Use   • Smoking status: Former Smoker     Packs/day: 1.00     Years: 10.00     Pack years: 10.00     Types: Cigarettes   • Smokeless tobacco: Never Used   Substance Use Topics   • Alcohol use: No     Comment: 31  "year former alcoholic        MEDICATIONS:    Current Outpatient Medications:   •  predniSONE (DELTASONE) 5 MG tablet, Take 5 mg by mouth Daily., Disp: , Rfl:   •  tadalafil (CIALIS) 5 MG tablet, Take 5 mg by mouth Daily., Disp: , Rfl:     ALLERGIES:  No Known Allergies      Objective   VITAL SIGNS:     Vitals:    12/20/19 1032   BP: 131/87   Pulse: 75   Resp: 16   Temp: 98.3 °F (36.8 °C)   TempSrc: Oral   SpO2: 99%   Weight: 91.5 kg (201 lb 11.2 oz)   Height: 179 cm (70.47\")  Comment: new ht   PainSc: 0-No pain     Body mass index is 28.55 kg/m².     Wt Readings from Last 3 Encounters:   12/20/19 91.5 kg (201 lb 11.2 oz)   08/21/18 94.3 kg (208 lb)   02/06/18 93.6 kg (206 lb 6.4 oz)       PHYSICAL EXAMINATION:  GENERAL:  The patient appears in good general condition, not in acute distress.  SKIN: Warm and dry. No skin rashes. No ecchymosis.  HEAD:  Normocephalic.  EYES:  No Jaundice. No Pallor. Pupils equal. EOMI.  NECK:  Supple with Good ROM. No Thyromegaly. No Masses.  LYMPHATICS:  No cervical or supraclavicular lymphadenopathy.  CHEST: Normal respiratory effort. Lungs clear to auscultation.   CARDIAC:  Normal S1 & S2. No murmur. No edema.  ABDOMEN:  Soft. No tenderness. No Hepatomegaly. No Splenomegaly. No masses.  Specifically, no tenderness in the right lower quadrant.  Lower abdominal scars well-healed.  NEUROLOGICAL:  No Focal neurological deficits.         DIAGNOSTIC DATA:       Component      Latest Ref Rng & Units 12/13/2019   WBC      3.40 - 10.80 10*3/mm3 4.58   RBC      4.14 - 5.80 10*6/mm3 5.37   Hemoglobin      13.0 - 17.7 g/dL 14.1   Hematocrit      37.5 - 51.0 % 44.9   MCV      79.0 - 97.0 fL 83.6   MCH      26.6 - 33.0 pg 26.3 (L)   MCHC      31.5 - 35.7 g/dL 31.4 (L)   RDW      12.3 - 15.4 % 13.8   RDW-SD      37.0 - 54.0 fl 42.1   MPV      6.0 - 12.0 fL 10.1   Platelets      140 - 450 10*3/mm3 163   Neutrophil Rel %      42.7 - 76.0 % 64.4   Lymphocyte Rel %      19.6 - 45.3 % 24.7   Monocyte Rel " %      5.0 - 12.0 % 8.3   Eosinophil Rel %      0.3 - 6.2 % 1.5   Basophil Rel %      0.0 - 1.5 % 0.7   Immature Granulocyte Rel %      0.0 - 0.5 % 0.4   Neutrophils Absolute      1.70 - 7.00 10*3/mm3 2.95   Lymphocytes Absolute      0.70 - 3.10 10*3/mm3 1.13   Monocytes Absolute      0.10 - 0.90 10*3/mm3 0.38   Eosinophils Absolute      0.00 - 0.40 10*3/mm3 0.07   Basophils Absolute      0.00 - 0.20 10*3/mm3 0.03   Immature Grans, Absolute      0.00 - 0.05 10*3/mm3 0.02   nRBC      0.0 - 0.2 /100 WBC 0.0   Glucose      74 - 124 mg/dL 100   BUN      6 - 20 mg/dL 14   Creatinine      0.70 - 1.30 mg/dL 1.20   Sodium      134 - 145 mmol/L 144   Potassium      3.5 - 4.7 mmol/L 4.5   Chloride      98 - 107 mmol/L 106   CO2      22.0 - 29.0 mmol/L 28.1   Calcium      8.5 - 10.2 mg/dL 8.9   Total Protein      6.3 - 8.0 g/dL 6.6   Albumin      3.50 - 5.20 g/dL 4.00   ALT (SGPT)      0 - 41 U/L 13   AST (SGOT)      0 - 40 U/L 20   Alkaline Phosphatase      38 - 116 U/L 83   Total Bilirubin      0.2 - 1.2 mg/dL 0.5   eGFR African Am      >60 mL/min/1.73 72   Globulin      1.8 - 3.5 gm/dL 2.6   A/G Ratio      1.1 - 2.4 g/dL 1.5   BUN/Creatinine Ratio      7.3 - 30.0 11.7   Anion Gap      5.0 - 15.0 mmol/L 9.9         Lab Results   Component Value Date    CEA 4.22 12/13/2019    CEA 3.46 08/14/2018    CEA 3.41 01/30/2018    CEA 3.37 08/02/2017    CEA 3.81 02/21/2017     Lab Results   Component Value Date    PSA 0.321 12/13/2019    PSA 0.436 08/14/2018    PSA 1.460 01/30/2018        Assessment/Plan   1.  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   · He had right hemicolectomy on 06/19/2014. His baseline CEA was elevated at 8.9 on 6/22/14.   · He Initially had thickening at the area of the right paracolic gutter concerning for possible local recurrence. The CT scan from 01/19/2016 revealed resolution of this finding.   · CEA has slightly increased and it is at 4.2 today.  · Due to the increase in  the CEA, I recommended obtaining CT scan of the abdomen pelvis.  He agreed to proceed.      2.  Prostate cancer diagnosed in 2014.  PSA Increased to 1.460 on 1/30/18 improved afterwards.  PSA is down to 0.321 today.      PLAN:    1.  I will obtain a CT scan of the abdomen pelvis in 1 week.  We will contact him with the results.  2.  If the CT scan does not show evidence of recurrence of the appendiceal tumor, I would recommend repeating CBC CMP CEA PSA in 6 months.  I will see him in follow-up in 1 year with repeat labs 1 week prior.    Katheryn Haider MD  12/20/19

## 2019-12-27 ENCOUNTER — HOSPITAL ENCOUNTER (OUTPATIENT)
Dept: CT IMAGING | Facility: HOSPITAL | Age: 74
Discharge: HOME | End: 2019-12-27
Admitting: INTERNAL MEDICINE

## 2019-12-27 DIAGNOSIS — D37.3 LOW GRADE MUCINOUS NEOPLASM OF APPENDIX: ICD-10-CM

## 2019-12-27 DIAGNOSIS — R97.8 ELEVATED TUMOR MARKERS: ICD-10-CM

## 2019-12-27 DIAGNOSIS — C61 PROSTATE CANCER (HCC): ICD-10-CM

## 2019-12-27 PROCEDURE — 0 DIATRIZOATE MEGLUMINE & SODIUM PER 1 ML: Performed by: INTERNAL MEDICINE

## 2019-12-27 PROCEDURE — 25010000002 IOPAMIDOL 61 % SOLUTION: Performed by: INTERNAL MEDICINE

## 2019-12-27 PROCEDURE — 82565 ASSAY OF CREATININE: CPT

## 2019-12-27 PROCEDURE — 74177 CT ABD & PELVIS W/CONTRAST: CPT

## 2019-12-27 RX ADMIN — IOPAMIDOL 85 ML: 612 INJECTION, SOLUTION INTRAVENOUS at 09:50

## 2019-12-27 RX ADMIN — DIATRIZOATE MEGLUMINE AND DIATRIZOATE SODIUM 30 ML: 660; 100 LIQUID ORAL; RECTAL at 09:50

## 2019-12-31 ENCOUNTER — TELEPHONE (OUTPATIENT)
Dept: ONCOLOGY | Facility: CLINIC | Age: 74
End: 2019-12-31

## 2019-12-31 LAB — CREAT BLDA-MCNC: 1.2 MG/DL (ref 0.6–1.3)

## 2019-12-31 NOTE — TELEPHONE ENCOUNTER
Inform the patient that the CT scan showed no evidence of cancer. Pt v/u.  ----- Message from Anusha Serrato RN sent at 12/30/2019 10:58 AM EST -----      ----- Message -----  From: Katheryn Haider MD  Sent: 12/30/2019  10:48 AM EST  To: [facility]    Please inform the patient that the CT scan showed no evidence of cancer.    Thank you

## 2020-06-16 ENCOUNTER — TELEPHONE (OUTPATIENT)
Dept: ONCOLOGY | Facility: CLINIC | Age: 75
End: 2020-06-16

## 2020-06-16 ENCOUNTER — LAB (OUTPATIENT)
Dept: LAB | Facility: HOSPITAL | Age: 75
End: 2020-06-16

## 2020-06-16 DIAGNOSIS — D37.3 LOW GRADE MUCINOUS NEOPLASM OF APPENDIX: ICD-10-CM

## 2020-06-16 DIAGNOSIS — C61 PROSTATE CANCER (HCC): ICD-10-CM

## 2020-06-16 DIAGNOSIS — R97.8 ELEVATED TUMOR MARKERS: ICD-10-CM

## 2020-06-16 LAB
ALBUMIN SERPL-MCNC: 4.2 G/DL (ref 3.5–5.2)
ALBUMIN/GLOB SERPL: 1.5 G/DL (ref 1.1–2.4)
ALP SERPL-CCNC: 83 U/L (ref 38–116)
ALT SERPL W P-5'-P-CCNC: 16 U/L (ref 0–41)
ANION GAP SERPL CALCULATED.3IONS-SCNC: 9.6 MMOL/L (ref 5–15)
AST SERPL-CCNC: 22 U/L (ref 0–40)
BASOPHILS # BLD AUTO: 0.02 10*3/MM3 (ref 0–0.2)
BASOPHILS NFR BLD AUTO: 0.4 % (ref 0–1.5)
BILIRUB SERPL-MCNC: 0.8 MG/DL (ref 0.2–1.2)
BUN BLD-MCNC: 14 MG/DL (ref 6–20)
BUN/CREAT SERPL: 10.9 (ref 7.3–30)
CALCIUM SPEC-SCNC: 9.4 MG/DL (ref 8.5–10.2)
CEA SERPL-MCNC: 4.44 NG/ML
CHLORIDE SERPL-SCNC: 107 MMOL/L (ref 98–107)
CO2 SERPL-SCNC: 26.4 MMOL/L (ref 22–29)
CREAT BLD-MCNC: 1.28 MG/DL (ref 0.7–1.3)
DEPRECATED RDW RBC AUTO: 41.8 FL (ref 37–54)
EOSINOPHIL # BLD AUTO: 0.04 10*3/MM3 (ref 0–0.4)
EOSINOPHIL NFR BLD AUTO: 0.8 % (ref 0.3–6.2)
ERYTHROCYTE [DISTWIDTH] IN BLOOD BY AUTOMATED COUNT: 14.1 % (ref 12.3–15.4)
GFR SERPL CREATININE-BSD FRML MDRD: 67 ML/MIN/1.73
GLOBULIN UR ELPH-MCNC: 2.8 GM/DL (ref 1.8–3.5)
GLUCOSE BLD-MCNC: 112 MG/DL (ref 74–124)
HCT VFR BLD AUTO: 44.7 % (ref 37.5–51)
HGB BLD-MCNC: 14.1 G/DL (ref 13–17.7)
IMM GRANULOCYTES # BLD AUTO: 0.02 10*3/MM3 (ref 0–0.05)
IMM GRANULOCYTES NFR BLD AUTO: 0.4 % (ref 0–0.5)
LYMPHOCYTES # BLD AUTO: 1.02 10*3/MM3 (ref 0.7–3.1)
LYMPHOCYTES NFR BLD AUTO: 21.5 % (ref 19.6–45.3)
MCH RBC QN AUTO: 26 PG (ref 26.6–33)
MCHC RBC AUTO-ENTMCNC: 31.5 G/DL (ref 31.5–35.7)
MCV RBC AUTO: 82.5 FL (ref 79–97)
MONOCYTES # BLD AUTO: 0.32 10*3/MM3 (ref 0.1–0.9)
MONOCYTES NFR BLD AUTO: 6.7 % (ref 5–12)
NEUTROPHILS # BLD AUTO: 3.33 10*3/MM3 (ref 1.7–7)
NEUTROPHILS NFR BLD AUTO: 70.2 % (ref 42.7–76)
NRBC BLD AUTO-RTO: 0 /100 WBC (ref 0–0.2)
PLATELET # BLD AUTO: 172 10*3/MM3 (ref 140–450)
PMV BLD AUTO: 10.6 FL (ref 6–12)
POTASSIUM BLD-SCNC: 5.3 MMOL/L (ref 3.5–4.7)
PROT SERPL-MCNC: 7 G/DL (ref 6.3–8)
PSA SERPL-MCNC: 0.4 NG/ML (ref 0–4)
RBC # BLD AUTO: 5.42 10*6/MM3 (ref 4.14–5.8)
SODIUM BLD-SCNC: 143 MMOL/L (ref 134–145)
WBC NRBC COR # BLD: 4.75 10*3/MM3 (ref 3.4–10.8)

## 2020-06-16 PROCEDURE — 36415 COLL VENOUS BLD VENIPUNCTURE: CPT

## 2020-06-16 PROCEDURE — 84153 ASSAY OF PSA TOTAL: CPT | Performed by: INTERNAL MEDICINE

## 2020-06-16 PROCEDURE — 85025 COMPLETE CBC W/AUTO DIFF WBC: CPT

## 2020-06-16 PROCEDURE — 80053 COMPREHEN METABOLIC PANEL: CPT

## 2020-06-16 PROCEDURE — 82378 CARCINOEMBRYONIC ANTIGEN: CPT | Performed by: INTERNAL MEDICINE

## 2020-06-16 NOTE — TELEPHONE ENCOUNTER
Results given. Noted slightly elevated potassium. Discussed dietary sources of potassium and foods to try to limit. Pt v/u

## 2020-06-16 NOTE — TELEPHONE ENCOUNTER
PT WOULD LIKE FOR SOMEONE TO GIVE HIM A CALL OR PUT ON MYCHART HIS LABS RESULTS DONE TODAY IN THE OFFICE.    [facility]   PH # [phone]

## 2020-12-15 ENCOUNTER — LAB (OUTPATIENT)
Dept: LAB | Facility: HOSPITAL | Age: 75
End: 2020-12-15

## 2020-12-15 DIAGNOSIS — D37.3 LOW GRADE MUCINOUS NEOPLASM OF APPENDIX: ICD-10-CM

## 2020-12-15 DIAGNOSIS — C61 PROSTATE CANCER (HCC): ICD-10-CM

## 2020-12-15 DIAGNOSIS — R97.8 ELEVATED TUMOR MARKERS: ICD-10-CM

## 2020-12-15 LAB
ALBUMIN SERPL-MCNC: 4 G/DL (ref 3.5–5.2)
ALBUMIN/GLOB SERPL: 1.4 G/DL (ref 1.1–2.4)
ALP SERPL-CCNC: 79 U/L (ref 38–116)
ALT SERPL W P-5'-P-CCNC: 15 U/L (ref 0–41)
ANION GAP SERPL CALCULATED.3IONS-SCNC: 8.3 MMOL/L (ref 5–15)
AST SERPL-CCNC: 22 U/L (ref 0–40)
BASOPHILS # BLD AUTO: 0.03 10*3/MM3 (ref 0–0.2)
BASOPHILS NFR BLD AUTO: 0.7 % (ref 0–1.5)
BILIRUB SERPL-MCNC: 0.7 MG/DL (ref 0.2–1.2)
BUN SERPL-MCNC: 17 MG/DL (ref 6–20)
BUN/CREAT SERPL: 12.4 (ref 7.3–30)
CALCIUM SPEC-SCNC: 9.2 MG/DL (ref 8.5–10.2)
CEA SERPL-MCNC: 4.02 NG/ML
CHLORIDE SERPL-SCNC: 111 MMOL/L (ref 98–107)
CO2 SERPL-SCNC: 25.7 MMOL/L (ref 22–29)
CREAT SERPL-MCNC: 1.37 MG/DL (ref 0.7–1.3)
DEPRECATED RDW RBC AUTO: 41.6 FL (ref 37–54)
EOSINOPHIL # BLD AUTO: 0.16 10*3/MM3 (ref 0–0.4)
EOSINOPHIL NFR BLD AUTO: 3.5 % (ref 0.3–6.2)
ERYTHROCYTE [DISTWIDTH] IN BLOOD BY AUTOMATED COUNT: 13.7 % (ref 12.3–15.4)
GFR SERPL CREATININE-BSD FRML MDRD: 62 ML/MIN/1.73
GLOBULIN UR ELPH-MCNC: 2.8 GM/DL (ref 1.8–3.5)
GLUCOSE SERPL-MCNC: 99 MG/DL (ref 74–124)
HCT VFR BLD AUTO: 42.6 % (ref 37.5–51)
HGB BLD-MCNC: 13.4 G/DL (ref 13–17.7)
IMM GRANULOCYTES # BLD AUTO: 0.02 10*3/MM3 (ref 0–0.05)
IMM GRANULOCYTES NFR BLD AUTO: 0.4 % (ref 0–0.5)
LYMPHOCYTES # BLD AUTO: 1.1 10*3/MM3 (ref 0.7–3.1)
LYMPHOCYTES NFR BLD AUTO: 23.9 % (ref 19.6–45.3)
MCH RBC QN AUTO: 26.1 PG (ref 26.6–33)
MCHC RBC AUTO-ENTMCNC: 31.5 G/DL (ref 31.5–35.7)
MCV RBC AUTO: 82.9 FL (ref 79–97)
MONOCYTES # BLD AUTO: 0.29 10*3/MM3 (ref 0.1–0.9)
MONOCYTES NFR BLD AUTO: 6.3 % (ref 5–12)
NEUTROPHILS NFR BLD AUTO: 3 10*3/MM3 (ref 1.7–7)
NEUTROPHILS NFR BLD AUTO: 65.2 % (ref 42.7–76)
NRBC BLD AUTO-RTO: 0 /100 WBC (ref 0–0.2)
PLATELET # BLD AUTO: 172 10*3/MM3 (ref 140–450)
PMV BLD AUTO: 10.9 FL (ref 6–12)
POTASSIUM SERPL-SCNC: 4 MMOL/L (ref 3.5–4.7)
PROT SERPL-MCNC: 6.8 G/DL (ref 6.3–8)
PSA SERPL-MCNC: 0.35 NG/ML (ref 0–4)
RBC # BLD AUTO: 5.14 10*6/MM3 (ref 4.14–5.8)
SODIUM SERPL-SCNC: 145 MMOL/L (ref 134–145)
WBC # BLD AUTO: 4.6 10*3/MM3 (ref 3.4–10.8)

## 2020-12-15 PROCEDURE — 36415 COLL VENOUS BLD VENIPUNCTURE: CPT

## 2020-12-15 PROCEDURE — 80053 COMPREHEN METABOLIC PANEL: CPT

## 2020-12-15 PROCEDURE — 84153 ASSAY OF PSA TOTAL: CPT | Performed by: INTERNAL MEDICINE

## 2020-12-15 PROCEDURE — 82378 CARCINOEMBRYONIC ANTIGEN: CPT | Performed by: INTERNAL MEDICINE

## 2020-12-15 PROCEDURE — 85025 COMPLETE CBC W/AUTO DIFF WBC: CPT

## 2020-12-22 ENCOUNTER — APPOINTMENT (OUTPATIENT)
Dept: LAB | Facility: HOSPITAL | Age: 75
End: 2020-12-22

## 2020-12-22 ENCOUNTER — OFFICE VISIT (OUTPATIENT)
Dept: ONCOLOGY | Facility: CLINIC | Age: 75
End: 2020-12-22

## 2020-12-22 VITALS
SYSTOLIC BLOOD PRESSURE: 122 MMHG | TEMPERATURE: 97.8 F | HEIGHT: 70 IN | HEART RATE: 79 BPM | OXYGEN SATURATION: 93 % | RESPIRATION RATE: 16 BRPM | DIASTOLIC BLOOD PRESSURE: 90 MMHG | WEIGHT: 203.5 LBS | BODY MASS INDEX: 29.13 KG/M2

## 2020-12-22 DIAGNOSIS — C61 PROSTATE CANCER (HCC): ICD-10-CM

## 2020-12-22 DIAGNOSIS — D37.3 LOW GRADE MUCINOUS NEOPLASM OF APPENDIX: Primary | ICD-10-CM

## 2020-12-22 PROCEDURE — 99213 OFFICE O/P EST LOW 20 MIN: CPT | Performed by: INTERNAL MEDICINE

## 2020-12-22 NOTE — PROGRESS NOTES
"Subjective     CHIEF COMPLAINT:      Chief Complaint   Patient presents with   • Annual Exam     no concerns       HISTORY OF PRESENT ILLNESS:     Danni Figueroa is a 75 y.o. male patient who returns today for follow up on his appendiceal cancer and prostate cancer.  He returns today for follow-up reporting no new symptoms.  Specifically, he is not having abdominal pain, diarrhea, bloating nausea or vomiting. His appetite is good and his weight is stable.      REVIEW OF SYSTEMS:  Review of Systems   Constitutional: Negative for fatigue, fever and unexpected weight change.   HENT: Negative for nosebleeds and voice change.    Eyes: Negative for visual disturbance.   Respiratory: Negative for cough and shortness of breath.    Cardiovascular: Negative for chest pain and leg swelling.   Gastrointestinal: Negative for abdominal pain, blood in stool, constipation, diarrhea, nausea and vomiting.   Genitourinary: Negative for frequency and hematuria.   Musculoskeletal: Negative for back pain and joint swelling.   Skin: Negative for rash.   Neurological: Negative for dizziness and headaches.   Hematological: Negative for adenopathy. Does not bruise/bleed easily.   Psychiatric/Behavioral: Negative for dysphoric mood. The patient is not nervous/anxious.      I reviewed and verified the CC and ROS obtained by the MA.     MEDICATIONS:    Current Outpatient Medications:   •  predniSONE (DELTASONE) 5 MG tablet, Take 5 mg by mouth Daily., Disp: , Rfl:   •  tadalafil (CIALIS) 5 MG tablet, Take 5 mg by mouth Daily., Disp: , Rfl:     ALLERGIES:  No Known Allergies      Objective   VITAL SIGNS:     Vitals:    12/22/20 1055   BP: 122/90   Pulse: 79   Resp: 16   Temp: 97.8 °F (36.6 °C)   TempSrc: Temporal   SpO2: 93%   Weight: 92.3 kg (203 lb 8 oz)   Height: 177 cm (69.69\")  Comment: new ht   PainSc: 0-No pain     Body mass index is 29.46 kg/m².     Wt Readings from Last 3 Encounters:   12/22/20 92.3 kg (203 lb 8 oz)   12/20/19 91.5 kg " (201 lb 11.2 oz)   08/21/18 94.3 kg (208 lb)       PHYSICAL EXAMINATION:  GENERAL:  The patient appears in good general condition, not in acute distress.  SKIN: No skin rashes. No ecchymosis.  HEAD:  Normocephalic.  EYES:  No Jaundice. No Pallor. Pupils equal. EOMI.  NECK:  Supple with Good ROM. No Thyromegaly. No Masses.  LYMPHATICS:  No cervical or supraclavicular lymphadenopathy.  CHEST: Normal respiratory effort. Lungs clear to auscultation.   CARDIAC:  Normal S1 & S2. No murmur. No edema.  ABDOMEN:  Soft. No tenderness. No Hepatomegaly. No Splenomegaly. No masses.  NEUROLOGICAL:  No Focal neurological deficits.       DIAGNOSTIC DATA:     Component      Latest Ref Rng & Units 12/15/2020   WBC      3.40 - 10.80 10*3/mm3 4.60   RBC      4.14 - 5.80 10*6/mm3 5.14   Hemoglobin      13.0 - 17.7 g/dL 13.4   Hematocrit      37.5 - 51.0 % 42.6   MCV      79.0 - 97.0 fL 82.9   MCH      26.6 - 33.0 pg 26.1 (L)   MCHC      31.5 - 35.7 g/dL 31.5   RDW      12.3 - 15.4 % 13.7   RDW-SD      37.0 - 54.0 fl 41.6   MPV      6.0 - 12.0 fL 10.9   Platelets      140 - 450 10*3/mm3 172   Neutrophil Rel %      42.7 - 76.0 % 65.2   Lymphocyte Rel %      19.6 - 45.3 % 23.9   Monocyte Rel %      5.0 - 12.0 % 6.3   Eosinophil Rel %      0.3 - 6.2 % 3.5   Basophil Rel %      0.0 - 1.5 % 0.7   Immature Granulocyte Rel %      0.0 - 0.5 % 0.4   Neutrophils Absolute      1.70 - 7.00 10*3/mm3 3.00   Lymphocytes Absolute      0.70 - 3.10 10*3/mm3 1.10   Monocytes Absolute      0.10 - 0.90 10*3/mm3 0.29   Eosinophils Absolute      0.00 - 0.40 10*3/mm3 0.16   Basophils Absolute      0.00 - 0.20 10*3/mm3 0.03   Immature Grans, Absolute      0.00 - 0.05 10*3/mm3 0.02   nRBC      0.0 - 0.2 /100 WBC 0.0   Glucose      74 - 124 mg/dL 99   BUN      6 - 20 mg/dL 17   Creatinine      0.70 - 1.30 mg/dL 1.37 (H)   Sodium      134 - 145 mmol/L 145   Potassium      3.5 - 4.7 mmol/L 4.0   Chloride      98 - 107 mmol/L 111 (H)   CO2      22.0 - 29.0 mmol/L  25.7   Calcium      8.5 - 10.2 mg/dL 9.2   Total Protein      6.3 - 8.0 g/dL 6.8   Albumin      3.50 - 5.20 g/dL 4.00   ALT (SGPT)      0 - 41 U/L 15   AST (SGOT)      0 - 40 U/L 22   Alkaline Phosphatase      38 - 116 U/L 79   Total Bilirubin      0.2 - 1.2 mg/dL 0.7   eGFR African Am      >60 mL/min/1.73 62   Globulin      1.8 - 3.5 gm/dL 2.8   A/G Ratio      1.1 - 2.4 g/dL 1.4   BUN/Creatinine Ratio      7.3 - 30.0 12.4   Anion Gap      5.0 - 15.0 mmol/L 8.3   PSA      0.000 - 4.000 ng/mL 0.347   CEA      ng/mL 4.02       Component      Latest Ref Rng & Units 12/13/2019 6/16/2020 12/15/2020   PSA      0.000 - 4.000 ng/mL 0.321 0.402 0.347       Assessment/Plan   1.  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   · He had right hemicolectomy on 06/19/2014. His baseline CEA was elevated at 8.9 on 6/22/14.   · He Initially had thickening at the area of the right paracolic gutter concerning for possible local recurrence. The CT scan from 01/19/2016 revealed resolution of this finding.   · CEA has slightly increased to 4.2 on 12/13/2019.  · We obtained a CT scan of the abdomen pelvis that showed no evidence of recurrence of the appendiceal mucinous neoplasm.  · Patient is currently not having symptoms.  Abdominal exam is benign.  CEA is stable and currently at 4.02.    2.  Prostate cancer diagnosed in 2014.    · PSA Increased to 1.460 on 1/30/18 improved afterwards.    · PSA is currently at 0.347.  · Patient is not having Urologic symptoms.      PLAN:    -We will continue with monitoring but increase the interval to 1 year.    -Since he sees his primary care physician in April of each year, we will see him in follow-up in December.  I will obtain CBC CMP CEA and PSA 1 week before his return visit.        Katheryn Haider MD  12/22/20

## 2021-03-09 DIAGNOSIS — Z23 IMMUNIZATION DUE: ICD-10-CM

## 2021-08-10 ENCOUNTER — OFFICE VISIT (OUTPATIENT)
Dept: CARDIOLOGY | Facility: CLINIC | Age: 76
End: 2021-08-10

## 2021-08-10 VITALS
SYSTOLIC BLOOD PRESSURE: 128 MMHG | BODY MASS INDEX: 28.19 KG/M2 | WEIGHT: 201.4 LBS | HEIGHT: 71 IN | DIASTOLIC BLOOD PRESSURE: 88 MMHG | OXYGEN SATURATION: 99 % | HEART RATE: 59 BPM

## 2021-08-10 DIAGNOSIS — R94.31 ABNORMAL ELECTROCARDIOGRAM (ECG) (EKG): ICD-10-CM

## 2021-08-10 DIAGNOSIS — R00.1 BRADYCARDIA, SINUS: Primary | ICD-10-CM

## 2021-08-10 PROCEDURE — 93000 ELECTROCARDIOGRAM COMPLETE: CPT | Performed by: INTERNAL MEDICINE

## 2021-08-10 PROCEDURE — 99204 OFFICE O/P NEW MOD 45 MIN: CPT | Performed by: INTERNAL MEDICINE

## 2021-08-10 NOTE — PROGRESS NOTES
Subjective:     Encounter Date:08/10/2021      Patient ID: Danni Figueroa is a 75 y.o. male.    Chief Complaint:  History of Present Illness    This is a 75-year-old with a history of polymyalgia rheumatica, diet-controlled diabetes mellitus, prostate cancer, appendiceal mucinous neoplasm status post right hemicolectomy, who presents for an evaluation of an abnormal EKG and sinus bradycardia.    The patient was seen for routine follow-up visit by Dr. Stallings.  Reported occasional decline in his blood pressures but with no associated symptoms.  An EKG was performed in the office showing sinus rhythm with a heart rate of 48 bpm, and inferior lateral borderline T wave changes.  He was referred here for further evaluation.    The patient denies any chest pain, shortness of breath, palpitation, orthopnea, near-syncope or syncope, or lower extremity edema.  He reports that he has a prior history of dizziness which he attributes to vertigo.  It appears that he went to the emergency room for one such episode in 4/2016.  At that time an EKG showed normal sinus rhythm with a heart rate of 2 5 bpm, ventricular trigeminy, and nonspecific inferior T wave changes.  The patient denies any recent episodes of lightheadedness or dizziness.  He exercises by walking about 5 or 6 miles on a daily basis without any change in his exercise tolerance or any symptoms of the ordinary.  He reports a family history of heart failure in 2 of his brothers starting in her 60s.  He is not aware of any history of coronary artery disease.  He reports that he has had cardiac testing in the past but this was several years ago.    Review of Systems   Constitutional: Negative for malaise/fatigue.   HENT: Negative for hearing loss, hoarse voice, nosebleeds and sore throat.    Eyes: Negative for pain.   Cardiovascular: Negative for chest pain, claudication, cyanosis, dyspnea on exertion, irregular heartbeat, leg swelling, near-syncope, orthopnea,  palpitations, paroxysmal nocturnal dyspnea and syncope.   Respiratory: Negative for shortness of breath and snoring.    Endocrine: Negative for cold intolerance, heat intolerance, polydipsia, polyphagia and polyuria.   Skin: Negative for itching and rash.   Musculoskeletal: Negative for arthritis, falls, joint pain, joint swelling, muscle cramps, muscle weakness and myalgias.   Gastrointestinal: Negative for constipation, diarrhea, dysphagia, heartburn, hematemesis, hematochezia, melena, nausea and vomiting.   Genitourinary: Negative for frequency, hematuria and hesitancy.   Neurological: Negative for excessive daytime sleepiness, dizziness, headaches, light-headedness, numbness and weakness.   Psychiatric/Behavioral: Negative for depression. The patient is not nervous/anxious.          Current Outpatient Medications:   •  predniSONE (DELTASONE) 5 MG tablet, Take 5 mg by mouth Daily., Disp: , Rfl:   •  tadalafil (CIALIS) 5 MG tablet, Take 5 mg by mouth Daily., Disp: , Rfl:     Past Medical History:   Diagnosis Date   • Diabetes mellitus (CMS/HCC)    • History of alcohol abuse    • Hypertension    • Low grade mucinous neoplasm of appendix    • Peripheral vertigo    • Prostate carcinoma (CMS/HCC)        Past Surgical History:   Procedure Laterality Date   • APPENDECTOMY  1993   • COLON SURGERY  06/2014    Right hemicolectomy-Pericecal mass   • COLONOSCOPY     • PROSTATE SURGERY  2014       Family History   Problem Relation Age of Onset   • Prostate cancer Brother    • Heart failure Brother    • Diabetes Other    • Heart failure Brother    • Heart failure Son        Social History     Tobacco Use   • Smoking status: Former Smoker     Packs/day: 1.00     Years: 10.00     Pack years: 10.00     Types: Cigarettes   • Smokeless tobacco: Never Used   • Tobacco comment: caffeine use   Substance Use Topics   • Alcohol use: No     Comment: 29 year former alcoholic    • Drug use: No         ECG 12 Lead    Date/Time: 8/10/2021  "5:46 PM  Performed by: Carrie Castañeda MD  Authorized by: Carrie Castañeda MD   Comparison: compared with previous ECG   Similar to previous ECG  Rhythm: sinus rhythm  T inversion: III, aVF, V4, V5 and V6                 Objective:     Visit Vitals  /88 (BP Location: Right arm, Patient Position: Sitting, Cuff Size: Adult)   Pulse 59   Ht 179.1 cm (70.5\")   Wt 91.4 kg (201 lb 6.4 oz)   SpO2 99%   BMI 28.49 kg/m²         Constitutional:       Appearance: Normal appearance. Well-developed.   HENT:      Head: Normocephalic and atraumatic.   Neck:      Vascular: No carotid bruit or JVD.   Pulmonary:      Effort: Pulmonary effort is normal.      Breath sounds: Normal breath sounds.   Cardiovascular:      Normal rate. Regular rhythm.      No gallop.   Pulses:     Radial: 2+ bilaterally.  Edema:     Peripheral edema absent.   Abdominal:      Palpations: Abdomen is soft.   Skin:     General: Skin is warm and dry.   Neurological:      Mental Status: Alert and oriented to person, place, and time.             Assessment:          Diagnosis Plan   1. Bradycardia, sinus  Adult Transthoracic Echo Complete w/ Color, Spectral and Contrast if Necessary Per Protocol   2. Abnormal electrocardiogram (ECG) (EKG)   Adult Transthoracic Echo Complete w/ Color, Spectral and Contrast if Necessary Per Protocol          Plan:       1.  Abnormal EKG.  He has borderline inferior lateral T wave changes.  His inferior T wave changes are chronic and were present as far back as 2016.  The lateral T wave changes appear to be new but are stable on his EKG today compared to his EKG with Dr. Stallings's office.  He is not having any symptoms suggestive of ischemia.  In fact he exercises on a daily basis without any problem.  Recommend starting with an echocardiogram.  This looks okay and the patient remains asymptomatic I would not pursue any further work-up at this time.  2.  Bradycardia.  Heart rates are in the 50s today.  He is not having any " symptoms to suggest symptomatic bradycardia.  I suspect his heart rates tend to run low at baseline.  I do not think we need to pursue further work-up of this at this time.    I will call and discuss results of his echocardiogram determine further recommendations based on those results.

## 2021-08-23 ENCOUNTER — HOSPITAL ENCOUNTER (OUTPATIENT)
Dept: CARDIOLOGY | Facility: HOSPITAL | Age: 76
Discharge: HOME | End: 2021-08-23
Admitting: INTERNAL MEDICINE

## 2021-08-23 VITALS
BODY MASS INDEX: 28.14 KG/M2 | WEIGHT: 201 LBS | HEART RATE: 52 BPM | HEIGHT: 71 IN | SYSTOLIC BLOOD PRESSURE: 122 MMHG | OXYGEN SATURATION: 94 % | DIASTOLIC BLOOD PRESSURE: 82 MMHG

## 2021-08-23 DIAGNOSIS — R94.31 ABNORMAL ELECTROCARDIOGRAM (ECG) (EKG): ICD-10-CM

## 2021-08-23 DIAGNOSIS — R00.1 BRADYCARDIA, SINUS: ICD-10-CM

## 2021-08-23 PROCEDURE — 93356 MYOCRD STRAIN IMG SPCKL TRCK: CPT | Performed by: INTERNAL MEDICINE

## 2021-08-23 PROCEDURE — 93306 TTE W/DOPPLER COMPLETE: CPT | Performed by: INTERNAL MEDICINE

## 2021-08-23 PROCEDURE — 93306 TTE W/DOPPLER COMPLETE: CPT

## 2021-08-23 PROCEDURE — 93356 MYOCRD STRAIN IMG SPCKL TRCK: CPT

## 2021-08-24 LAB
AORTIC ARCH: 2 CM
ASCENDING AORTA: 4 CM
BH CV ECHO MEAS - ACS: 2.6 CM
BH CV ECHO MEAS - AO ARCH DIAM (PROXIMAL TRANS.): 2 CM
BH CV ECHO MEAS - AO MAX PG (FULL): 2.1 MMHG
BH CV ECHO MEAS - AO MAX PG: 5.1 MMHG
BH CV ECHO MEAS - AO MEAN PG (FULL): 1.3 MMHG
BH CV ECHO MEAS - AO MEAN PG: 3.1 MMHG
BH CV ECHO MEAS - AO ROOT AREA (BSA CORRECTED): 1.9
BH CV ECHO MEAS - AO ROOT AREA: 13.1 CM^2
BH CV ECHO MEAS - AO ROOT DIAM: 4.1 CM
BH CV ECHO MEAS - AO V2 MAX: 113.3 CM/SEC
BH CV ECHO MEAS - AO V2 MEAN: 83.3 CM/SEC
BH CV ECHO MEAS - AO V2 VTI: 21.7 CM
BH CV ECHO MEAS - ASC AORTA: 4 CM
BH CV ECHO MEAS - AVA(I,A): 3.1 CM^2
BH CV ECHO MEAS - AVA(I,D): 3.1 CM^2
BH CV ECHO MEAS - AVA(V,A): 3.2 CM^2
BH CV ECHO MEAS - AVA(V,D): 3.2 CM^2
BH CV ECHO MEAS - BSA(HAYCOCK): 2.2 M^2
BH CV ECHO MEAS - BSA: 2.1 M^2
BH CV ECHO MEAS - BZI_BMI: 28 KILOGRAMS/M^2
BH CV ECHO MEAS - BZI_METRIC_HEIGHT: 180.3 CM
BH CV ECHO MEAS - BZI_METRIC_WEIGHT: 91.2 KG
BH CV ECHO MEAS - EDV(MOD-SP2): 87 ML
BH CV ECHO MEAS - EDV(MOD-SP4): 86 ML
BH CV ECHO MEAS - EDV(TEICH): 95.7 ML
BH CV ECHO MEAS - EF(CUBED): 83.9 %
BH CV ECHO MEAS - EF(MOD-BP): 62.9 %
BH CV ECHO MEAS - EF(MOD-SP2): 62.1 %
BH CV ECHO MEAS - EF(MOD-SP4): 62.8 %
BH CV ECHO MEAS - EF(TEICH): 77.1 %
BH CV ECHO MEAS - ESV(MOD-SP2): 33 ML
BH CV ECHO MEAS - ESV(MOD-SP4): 32 ML
BH CV ECHO MEAS - ESV(TEICH): 21.9 ML
BH CV ECHO MEAS - FS: 45.6 %
BH CV ECHO MEAS - IVS/LVPW: 0.94
BH CV ECHO MEAS - IVSD: 1.3 CM
BH CV ECHO MEAS - LAT PEAK E' VEL: 7.5 CM/SEC
BH CV ECHO MEAS - LV DIASTOLIC VOL/BSA (35-75): 40.7 ML/M^2
BH CV ECHO MEAS - LV MASS(C)D: 229.3 GRAMS
BH CV ECHO MEAS - LV MASS(C)DI: 108.5 GRAMS/M^2
BH CV ECHO MEAS - LV MAX PG: 3.1 MMHG
BH CV ECHO MEAS - LV MEAN PG: 1.8 MMHG
BH CV ECHO MEAS - LV SYSTOLIC VOL/BSA (12-30): 15.1 ML/M^2
BH CV ECHO MEAS - LV V1 MAX: 87.4 CM/SEC
BH CV ECHO MEAS - LV V1 MEAN: 64 CM/SEC
BH CV ECHO MEAS - LV V1 VTI: 16.7 CM
BH CV ECHO MEAS - LVIDD: 4.6 CM
BH CV ECHO MEAS - LVIDS: 2.5 CM
BH CV ECHO MEAS - LVLD AP2: 7.2 CM
BH CV ECHO MEAS - LVLD AP4: 7.4 CM
BH CV ECHO MEAS - LVLS AP2: 6.4 CM
BH CV ECHO MEAS - LVLS AP4: 6.3 CM
BH CV ECHO MEAS - LVOT AREA (M): 4.2 CM^2
BH CV ECHO MEAS - LVOT AREA: 4.1 CM^2
BH CV ECHO MEAS - LVOT DIAM: 2.3 CM
BH CV ECHO MEAS - LVPWD: 1.3 CM
BH CV ECHO MEAS - MED PEAK E' VEL: 6.7 CM/SEC
BH CV ECHO MEAS - MV A DUR: 0.12 SEC
BH CV ECHO MEAS - MV A MAX VEL: 65.1 CM/SEC
BH CV ECHO MEAS - MV DEC SLOPE: 266.6 CM/SEC^2
BH CV ECHO MEAS - MV DEC TIME: 0.23 SEC
BH CV ECHO MEAS - MV E MAX VEL: 35.1 CM/SEC
BH CV ECHO MEAS - MV E/A: 0.54
BH CV ECHO MEAS - MV MAX PG: 2.7 MMHG
BH CV ECHO MEAS - MV MEAN PG: 0.67 MMHG
BH CV ECHO MEAS - MV P1/2T MAX VEL: 59 CM/SEC
BH CV ECHO MEAS - MV P1/2T: 64.9 MSEC
BH CV ECHO MEAS - MV V2 MAX: 81.4 CM/SEC
BH CV ECHO MEAS - MV V2 MEAN: 35.2 CM/SEC
BH CV ECHO MEAS - MV V2 VTI: 23.4 CM
BH CV ECHO MEAS - MVA P1/2T LCG: 3.7 CM^2
BH CV ECHO MEAS - MVA(P1/2T): 3.4 CM^2
BH CV ECHO MEAS - MVA(VTI): 2.9 CM^2
BH CV ECHO MEAS - PA ACC TIME: 0.12 SEC
BH CV ECHO MEAS - PA MAX PG (FULL): 1.3 MMHG
BH CV ECHO MEAS - PA MAX PG: 2.4 MMHG
BH CV ECHO MEAS - PA PR(ACCEL): 26.7 MMHG
BH CV ECHO MEAS - PA V2 MAX: 77.4 CM/SEC
BH CV ECHO MEAS - PULM A REVS DUR: 0.11 SEC
BH CV ECHO MEAS - PULM A REVS VEL: 33.8 CM/SEC
BH CV ECHO MEAS - PULM DIAS VEL: 29.6 CM/SEC
BH CV ECHO MEAS - PULM S/D: 1.6
BH CV ECHO MEAS - PULM SYS VEL: 48.4 CM/SEC
BH CV ECHO MEAS - PVA(V,A): 2 CM^2
BH CV ECHO MEAS - PVA(V,D): 2 CM^2
BH CV ECHO MEAS - QP/QS: 0.47
BH CV ECHO MEAS - RAP SYSTOLE: 3 MMHG
BH CV ECHO MEAS - RV MAX PG: 1.1 MMHG
BH CV ECHO MEAS - RV MEAN PG: 0.61 MMHG
BH CV ECHO MEAS - RV V1 MAX: 52.3 CM/SEC
BH CV ECHO MEAS - RV V1 MEAN: 36.8 CM/SEC
BH CV ECHO MEAS - RV V1 VTI: 10.6 CM
BH CV ECHO MEAS - RVOT AREA: 3 CM^2
BH CV ECHO MEAS - RVOT DIAM: 2 CM
BH CV ECHO MEAS - RVSP: 15 MMHG
BH CV ECHO MEAS - SI(AO): 134.8 ML/M^2
BH CV ECHO MEAS - SI(CUBED): 37.8 ML/M^2
BH CV ECHO MEAS - SI(LVOT): 32.3 ML/M^2
BH CV ECHO MEAS - SI(MOD-SP2): 25.6 ML/M^2
BH CV ECHO MEAS - SI(MOD-SP4): 25.6 ML/M^2
BH CV ECHO MEAS - SI(TEICH): 34.9 ML/M^2
BH CV ECHO MEAS - SUP REN AO DIAM: 2.4 CM
BH CV ECHO MEAS - SV(AO): 284.9 ML
BH CV ECHO MEAS - SV(CUBED): 79.9 ML
BH CV ECHO MEAS - SV(LVOT): 68.3 ML
BH CV ECHO MEAS - SV(MOD-SP2): 54 ML
BH CV ECHO MEAS - SV(MOD-SP4): 54 ML
BH CV ECHO MEAS - SV(RVOT): 32.1 ML
BH CV ECHO MEAS - SV(TEICH): 73.7 ML
BH CV ECHO MEAS - TAPSE (>1.6): 2 CM
BH CV ECHO MEAS - TR MAX VEL: 170.4 CM/SEC
BH CV ECHO MEASUREMENTS AVERAGE E/E' RATIO: 4.94
BH CV XLRA - RV BASE: 3.6 CM
BH CV XLRA - RV LENGTH: 6 CM
BH CV XLRA - RV MID: 2.9 CM
BH CV XLRA - TDI S': 15.2 CM/SEC
LEFT ATRIUM VOLUME INDEX: 17 ML/M2
MAXIMAL PREDICTED HEART RATE: 147 BPM
SINUS: 3.8 CM
STJ: 3.5 CM
STRESS TARGET HR: 125 BPM

## 2021-08-24 NOTE — PROGRESS NOTES
I called and discussed normal echocardiogram results.  The patient would like to continue to be seen on an annual basis just for routine cardiac follow-up and evaluation.  I will arrange for him to follow-up in 1 year.

## 2021-11-30 ENCOUNTER — LAB (OUTPATIENT)
Dept: LAB | Facility: HOSPITAL | Age: 76
End: 2021-11-30

## 2021-11-30 DIAGNOSIS — D37.3 LOW GRADE MUCINOUS NEOPLASM OF APPENDIX: ICD-10-CM

## 2021-11-30 DIAGNOSIS — C61 PROSTATE CANCER (HCC): ICD-10-CM

## 2021-11-30 LAB
ALBUMIN SERPL-MCNC: 4 G/DL (ref 3.5–5.2)
ALBUMIN/GLOB SERPL: 1.5 G/DL (ref 1.1–2.4)
ALP SERPL-CCNC: 91 U/L (ref 38–116)
ALT SERPL W P-5'-P-CCNC: 16 U/L (ref 0–41)
ANION GAP SERPL CALCULATED.3IONS-SCNC: 7.7 MMOL/L (ref 5–15)
AST SERPL-CCNC: 20 U/L (ref 0–40)
BASOPHILS # BLD AUTO: 0.02 10*3/MM3 (ref 0–0.2)
BASOPHILS NFR BLD AUTO: 0.5 % (ref 0–1.5)
BILIRUB SERPL-MCNC: 0.5 MG/DL (ref 0.2–1.2)
BUN SERPL-MCNC: 18 MG/DL (ref 6–20)
BUN/CREAT SERPL: 12.4 (ref 7.3–30)
CALCIUM SPEC-SCNC: 9.4 MG/DL (ref 8.5–10.2)
CEA SERPL-MCNC: 3.53 NG/ML
CHLORIDE SERPL-SCNC: 109 MMOL/L (ref 98–107)
CO2 SERPL-SCNC: 27.3 MMOL/L (ref 22–29)
CREAT SERPL-MCNC: 1.45 MG/DL (ref 0.7–1.3)
DEPRECATED RDW RBC AUTO: 40.6 FL (ref 37–54)
EOSINOPHIL # BLD AUTO: 0.1 10*3/MM3 (ref 0–0.4)
EOSINOPHIL NFR BLD AUTO: 2.3 % (ref 0.3–6.2)
ERYTHROCYTE [DISTWIDTH] IN BLOOD BY AUTOMATED COUNT: 13.8 % (ref 12.3–15.4)
GFR SERPL CREATININE-BSD FRML MDRD: 58 ML/MIN/1.73
GLOBULIN UR ELPH-MCNC: 2.6 GM/DL (ref 1.8–3.5)
GLUCOSE SERPL-MCNC: 96 MG/DL (ref 74–124)
HCT VFR BLD AUTO: 44.6 % (ref 37.5–51)
HGB BLD-MCNC: 13.9 G/DL (ref 13–17.7)
IMM GRANULOCYTES # BLD AUTO: 0.01 10*3/MM3 (ref 0–0.05)
IMM GRANULOCYTES NFR BLD AUTO: 0.2 % (ref 0–0.5)
LYMPHOCYTES # BLD AUTO: 1 10*3/MM3 (ref 0.7–3.1)
LYMPHOCYTES NFR BLD AUTO: 23.1 % (ref 19.6–45.3)
MCH RBC QN AUTO: 25.5 PG (ref 26.6–33)
MCHC RBC AUTO-ENTMCNC: 31.2 G/DL (ref 31.5–35.7)
MCV RBC AUTO: 81.8 FL (ref 79–97)
MONOCYTES # BLD AUTO: 0.27 10*3/MM3 (ref 0.1–0.9)
MONOCYTES NFR BLD AUTO: 6.2 % (ref 5–12)
NEUTROPHILS NFR BLD AUTO: 2.93 10*3/MM3 (ref 1.7–7)
NEUTROPHILS NFR BLD AUTO: 67.7 % (ref 42.7–76)
NRBC BLD AUTO-RTO: 0 /100 WBC (ref 0–0.2)
PLATELET # BLD AUTO: 173 10*3/MM3 (ref 140–450)
PMV BLD AUTO: 10.9 FL (ref 6–12)
POTASSIUM SERPL-SCNC: 5 MMOL/L (ref 3.5–4.7)
PROT SERPL-MCNC: 6.6 G/DL (ref 6.3–8)
PSA SERPL-MCNC: 0.66 NG/ML (ref 0–4)
RBC # BLD AUTO: 5.45 10*6/MM3 (ref 4.14–5.8)
SODIUM SERPL-SCNC: 144 MMOL/L (ref 134–145)
WBC NRBC COR # BLD: 4.33 10*3/MM3 (ref 3.4–10.8)

## 2021-11-30 PROCEDURE — 80053 COMPREHEN METABOLIC PANEL: CPT

## 2021-11-30 PROCEDURE — 82378 CARCINOEMBRYONIC ANTIGEN: CPT | Performed by: INTERNAL MEDICINE

## 2021-11-30 PROCEDURE — 85025 COMPLETE CBC W/AUTO DIFF WBC: CPT

## 2021-11-30 PROCEDURE — 84153 ASSAY OF PSA TOTAL: CPT | Performed by: INTERNAL MEDICINE

## 2021-11-30 PROCEDURE — 36415 COLL VENOUS BLD VENIPUNCTURE: CPT

## 2021-12-07 ENCOUNTER — OFFICE VISIT (OUTPATIENT)
Dept: ONCOLOGY | Facility: CLINIC | Age: 76
End: 2021-12-07

## 2021-12-07 ENCOUNTER — APPOINTMENT (OUTPATIENT)
Dept: LAB | Facility: HOSPITAL | Age: 76
End: 2021-12-07

## 2021-12-07 VITALS
WEIGHT: 195.9 LBS | SYSTOLIC BLOOD PRESSURE: 127 MMHG | RESPIRATION RATE: 16 BRPM | HEART RATE: 63 BPM | TEMPERATURE: 97.1 F | OXYGEN SATURATION: 97 % | HEIGHT: 70 IN | DIASTOLIC BLOOD PRESSURE: 78 MMHG | BODY MASS INDEX: 28.05 KG/M2

## 2021-12-07 DIAGNOSIS — R97.21 RISING PSA FOLLOWING TREATMENT FOR MALIGNANT NEOPLASM OF PROSTATE: ICD-10-CM

## 2021-12-07 DIAGNOSIS — D37.3 LOW GRADE MUCINOUS NEOPLASM OF APPENDIX: Primary | ICD-10-CM

## 2021-12-07 DIAGNOSIS — C61 PROSTATE CANCER (HCC): ICD-10-CM

## 2021-12-07 PROCEDURE — 99214 OFFICE O/P EST MOD 30 MIN: CPT | Performed by: INTERNAL MEDICINE

## 2021-12-07 NOTE — PROGRESS NOTES
"Subjective     CHIEF COMPLAINT:      Chief Complaint   Patient presents with   • Annual Exam     no concerns       HISTORY OF PRESENT ILLNESS:     Danni Figueroa is a 76 y.o. male patient who returns today for follow up on his appendix neoplasm and prostate cancer.  He returns today for follow-up reporting no new symptoms.  He has increased urinary frequency which is chronic and started since he had his HIFU surgery in 2014.  No recent worsening.  No dysuria.  No hematuria.  He is not having abdominal pain.      ROS:  Pertinent ROS is in the HPI.     Past medical, surgical, social and family history were reviewed.     MEDICATIONS:    Current Outpatient Medications:   •  predniSONE (DELTASONE) 5 MG tablet, Take 5 mg by mouth Daily., Disp: , Rfl:   •  tadalafil (CIALIS) 5 MG tablet, Take 5 mg by mouth Daily., Disp: , Rfl:     Objective   VITAL SIGNS:     Vitals:    12/07/21 1007   BP: 127/78   Pulse: 63   Resp: 16   Temp: 97.1 °F (36.2 °C)   TempSrc: Temporal   SpO2: 97%   Weight: 88.9 kg (195 lb 14.4 oz)   Height: 177 cm (69.69\")  Comment: new ht without shoes   PainSc: 0-No pain     Body mass index is 28.36 kg/m².     Wt Readings from Last 5 Encounters:   12/07/21 88.9 kg (195 lb 14.4 oz)   08/23/21 91.2 kg (201 lb)   08/10/21 91.4 kg (201 lb 6.4 oz)   12/22/20 92.3 kg (203 lb 8 oz)   12/20/19 91.5 kg (201 lb 11.2 oz)       PHYSICAL EXAMINATION:  GENERAL: The patient appears in good general condition, not in acute distress.   SKIN: No ecchymosis.  EYES: No jaundice.  LYMPHATICS: No cervical lymphadenopathy.  CHEST: Normal respiratory effort.  Lungs clear bilaterally.  No added sounds.  CVS: Normal S1-S2.  No murmurs.  ABDOMEN: Soft. No tenderness. No Hepatomegaly. No Splenomegaly. No masses.  EXTREMITIES: No edema or calf tenderness    DIAGNOSTIC DATA:     Component      Latest Ref Rng & Units 11/30/2021   WBC      3.40 - 10.80 10*3/mm3 4.33   RBC      4.14 - 5.80 10*6/mm3 5.45   Hemoglobin      13.0 - 17.7 g/dL " 13.9   Hematocrit      37.5 - 51.0 % 44.6   MCV      79.0 - 97.0 fL 81.8   MCH      26.6 - 33.0 pg 25.5 (L)   MCHC      31.5 - 35.7 g/dL 31.2 (L)   RDW      12.3 - 15.4 % 13.8   RDW-SD      37.0 - 54.0 fl 40.6   MPV      6.0 - 12.0 fL 10.9   Platelets      140 - 450 10*3/mm3 173   Neutrophil Rel %      42.7 - 76.0 % 67.7   Lymphocyte Rel %      19.6 - 45.3 % 23.1   Monocyte Rel %      5.0 - 12.0 % 6.2   Eosinophil Rel %      0.3 - 6.2 % 2.3   Basophil Rel %      0.0 - 1.5 % 0.5   Immature Granulocyte Rel %      0.0 - 0.5 % 0.2   Neutrophils Absolute      1.70 - 7.00 10*3/mm3 2.93   Lymphocytes Absolute      0.70 - 3.10 10*3/mm3 1.00   Monocytes Absolute      0.10 - 0.90 10*3/mm3 0.27   Eosinophils Absolute      0.00 - 0.40 10*3/mm3 0.10   Basophils Absolute      0.00 - 0.20 10*3/mm3 0.02   Immature Grans, Absolute      0.00 - 0.05 10*3/mm3 0.01     Component      Latest Ref Rng & Units 11/30/2021   Glucose      74 - 124 mg/dL 96   BUN      6 - 20 mg/dL 18   Creatinine      0.70 - 1.30 mg/dL 1.45 (H)   Sodium      134 - 145 mmol/L 144   Potassium      3.5 - 4.7 mmol/L 5.0 (H)   Chloride      98 - 107 mmol/L 109 (H)   CO2      22.0 - 29.0 mmol/L 27.3   Calcium      8.5 - 10.2 mg/dL 9.4   Total Protein      6.3 - 8.0 g/dL 6.6   Albumin      3.50 - 5.20 g/dL 4.00   ALT (SGPT)      0 - 41 U/L 16   AST (SGOT)      0 - 40 U/L 20   Alkaline Phosphatase      38 - 116 U/L 91   Total Bilirubin      0.2 - 1.2 mg/dL 0.5   eGFR African Am      >60 mL/min/1.73 58 (L)   Globulin      1.8 - 3.5 gm/dL 2.6   A/G Ratio      1.1 - 2.4 g/dL 1.5   BUN/Creatinine Ratio      7.3 - 30.0 12.4   Anion Gap      5.0 - 15.0 mmol/L 7.7   PSA      0.000 - 4.000 ng/mL 0.660   CEA      ng/mL 3.53     Lab Results   Component Value Date    CEA 3.53 11/30/2021    CEA 4.02 12/15/2020    CEA 4.44 06/16/2020    CEA 4.22 12/13/2019    CEA 3.46 08/14/2018      Lab Results   Component Value Date    PSA 0.660 11/30/2021    PSA 0.347 12/15/2020    PSA 0.402  06/16/2020    PSA 0.321 12/13/2019    PSA 0.436 08/14/2018          Assessment/Plan   *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   · He had right hemicolectomy on 06/19/2014. His baseline CEA was elevated at 8.9 on 6/22/14.   · He Initially had thickening at the area of the right paracolic gutter concerning for possible local recurrence. The CT scan from 01/19/2016 revealed resolution of this finding.   · CEA has slightly increased to 4.2 on 12/13/2019.  · CT scan of the abdomen pelvis on 12/27/2019 revealed no evidence of recurrence of the appendiceal mucinous neoplasm.  · CEA is currently at 3.53.  · He is not having lower abdominal symptoms.  Abdominal exam is unremarkable today.    *Prostate cancer diagnosed in 2014.    · Patient was treated with HIFU treatment in 2014.  · PSA Increased to 1.460 on 1/30/18 improved afterwards.    · PSA decreased to 0.347 on 12/15/2020.   · PSA increased to 0.660 today.   · He does not have symptoms of urinary tract infection.  He has increased energy frequency which is chronic and unchanged.  · I explained to the patient that the increase is concerning for recurrence of the prostate cancer.  · I recommended follow-up with his urologist.  I explained that he would most likely have imaging studies in the form of MRI.  I explained the options of treatment depending on the findings-surgery versus radiation therapy.  · Patient indicated that he will contact the urologist for follow-up.      PLAN:    1.  Patient indicated that he will contact Dr. Chang for follow-up on the elevated PSA.  2.  I will see him in follow-up in 6 months.  1 week before his follow-up visit, I will obtain CBC CMP PSA and CEA levels.            Katheryn Haider MD  12/07/21

## 2022-03-23 ENCOUNTER — TRANSCRIBE ORDERS (OUTPATIENT)
Dept: ADMINISTRATIVE | Facility: HOSPITAL | Age: 77
End: 2022-03-23

## 2022-03-23 DIAGNOSIS — Z79.52 ON PREDNISONE THERAPY: Primary | ICD-10-CM

## 2022-03-28 ENCOUNTER — OFFICE VISIT (OUTPATIENT)
Dept: CARDIOLOGY | Facility: CLINIC | Age: 77
End: 2022-03-28

## 2022-03-28 VITALS
BODY MASS INDEX: 28.77 KG/M2 | DIASTOLIC BLOOD PRESSURE: 88 MMHG | SYSTOLIC BLOOD PRESSURE: 140 MMHG | HEART RATE: 52 BPM | HEIGHT: 70 IN | WEIGHT: 201 LBS

## 2022-03-28 DIAGNOSIS — R55 SYNCOPE AND COLLAPSE: Primary | ICD-10-CM

## 2022-03-28 PROBLEM — R00.1 BRADYCARDIA, SINUS: Status: ACTIVE | Noted: 2022-03-28

## 2022-03-28 PROCEDURE — 93000 ELECTROCARDIOGRAM COMPLETE: CPT | Performed by: NURSE PRACTITIONER

## 2022-03-28 PROCEDURE — 99214 OFFICE O/P EST MOD 30 MIN: CPT | Performed by: NURSE PRACTITIONER

## 2022-03-28 RX ORDER — FLUDROCORTISONE ACETATE 0.1 MG/1
TABLET ORAL
COMMUNITY
Start: 2022-03-18

## 2022-03-28 RX ORDER — PREDNISONE 1 MG/1
1 TABLET ORAL DAILY
COMMUNITY

## 2022-03-28 NOTE — PROGRESS NOTES
Subjective:     Encounter Date:03/28/2022      Patient ID: Danni Figueroa is a 76 y.o. male.    Chief Complaint: Bradycardia  History of Present Illness  This is a 75 y/o man who follows with Dr. Castañeda and is new to me today. 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 He was initially seen here in the office by Dr. Castañeda for and EKG performed at his PCP's office showing a heart rate of 48 bpm and inferior lateral borderline T wave changes. He was asymptomatic with his bradycardia. He had no complaints of anginal symptoms warranting any invasive testing for the T wave abnormalities noted on his EKG. He underwent an echocardiogram on 8/10/21 demonstrating an EF of 61-65% with no significant valvular heart disease.    He returns today for follow-up. He reports he had a syncopal event about 2 weeks ago. He got up that morning and was doing his morning routine, getting ready in the bathroom. He became lightheaded and passes out, losing consciousness for a few seconds. He did not injure himself. He had no loss of bowel or bladder control. He had no chest pain, SOA, or palpitations before or after the syncope. Afterwards, he rested for an hour and then felt fine. He went to see his PCP who had a CBC and BMP drawn, both of which were unremarkable. He was started on fludrocortisone to take PRN. He has experienced no more syncopal events since. He denies any swelling in his lower extremities, orthopnea or PND. He denies any exertional chest pain or SOA. He walks 12-14K steps every day at the gym with no problems.     I have reviewed and updated as appropriate allergies, current medications, past family history, past medical history, past surgical history and problem list.    Review of Systems   Constitutional: Negative for fever, malaise/fatigue, weight gain and weight loss.   HENT: Negative  for congestion, hoarse voice and sore throat.    Eyes: Negative for blurred vision and double vision.   Cardiovascular: Positive for syncope. Negative for chest pain, dyspnea on exertion, leg swelling, orthopnea and palpitations.   Respiratory: Negative for cough, shortness of breath and wheezing.    Gastrointestinal: Negative for abdominal pain, hematemesis, hematochezia and melena.   Genitourinary: Negative for dysuria and hematuria.   Neurological: Positive for light-headedness. Negative for headaches and numbness.   Psychiatric/Behavioral: Negative for depression. The patient is not nervous/anxious.          Current Outpatient Medications:   •  fludrocortisone 0.1 MG tablet, TAKE 1 TABLET BY MOUTH ONCE A DAY TO PREVENT PASSING OUT AND LOW BLOOD PRESSURE, Disp: , Rfl:   •  predniSONE (DELTASONE) 1 MG tablet, Take 1 mg by mouth Daily., Disp: , Rfl:   •  tadalafil (CIALIS) 5 MG tablet, Take 5 mg by mouth Daily., Disp: , Rfl:     Past Medical History:   Diagnosis Date   • Diabetes mellitus (HCC)    • History of alcohol abuse    • Hypertension    • Low grade mucinous neoplasm of appendix    • Peripheral vertigo    • Prostate carcinoma (HCC)        Past Surgical History:   Procedure Laterality Date   • APPENDECTOMY  1993   • COLON SURGERY  06/2014    Right hemicolectomy-Pericecal mass   • COLONOSCOPY     • PROSTATE SURGERY  2014       Family History   Problem Relation Age of Onset   • Prostate cancer Brother    • Heart failure Brother    • Diabetes Other    • Heart failure Brother    • Heart failure Son        Social History     Tobacco Use   • Smoking status: Former Smoker     Packs/day: 1.00     Years: 10.00     Pack years: 10.00     Types: Cigarettes   • Smokeless tobacco: Never Used   • Tobacco comment: caffeine use   Substance Use Topics   • Alcohol use: No     Comment: 29 year former alcoholic    • Drug use: No         ECG 12 Lead    Date/Time: 3/28/2022 1:05 PM  Performed by: Tea Mcfadden APRN Authorized  "by: Tea Mcfadden APRN   Comparison: compared with previous ECG from 8/10/2021  Similar to previous ECG  Rhythm: sinus rhythm  T inversion: III, V4 and V5  Comments: No significant change from previous EKG               Objective:     Visit Vitals  /88   Pulse 52   Ht 177 cm (69.69\")   Wt 91.2 kg (201 lb)   BMI 29.10 kg/m²             Physical Exam  Constitutional:       Appearance: Normal appearance. He is normal weight.   HENT:      Head: Normocephalic and atraumatic.   Neck:      Vascular: No carotid bruit.   Cardiovascular:      Rate and Rhythm: Normal rate and regular rhythm.      Chest Wall: PMI is not displaced.      Pulses: Normal pulses.           Radial pulses are 2+ on the right side and 2+ on the left side.        Posterior tibial pulses are 2+ on the right side and 2+ on the left side.      Heart sounds: S1 normal and S2 normal. No murmur heard.    No friction rub. No gallop.   Pulmonary:      Effort: Pulmonary effort is normal.      Breath sounds: Normal breath sounds.   Abdominal:      General: Bowel sounds are normal. There is no distension.      Palpations: Abdomen is soft.   Musculoskeletal:      Right lower leg: No edema.      Left lower leg: No edema.   Skin:     General: Skin is warm and dry.      Capillary Refill: Capillary refill takes less than 2 seconds.   Neurological:      Mental Status: He is alert and oriented to person, place, and time.   Psychiatric:         Mood and Affect: Mood normal.         Behavior: Behavior normal.         Thought Content: Thought content normal.          Lab Review:         Cardiac Procedures:   1.     Assessment:         Diagnoses and all orders for this visit:    1. Syncope and collapse (Primary)  -     Holter Monitor - 72 Hour Up To 15 Days; Future  -     Cardiac Event Monitor; Future    Other orders  -     ECG 12 Lead            Plan:       1. Syncope and collapse: Possibly secondary to bradycardia. Will order a 2 week event monitor to assess for " any arrhythmias. I don't think a repeat echocardiogram is needed at this time as he recently had one in December. I am not seeing any significant changes in his EKG either, so will hold off on ordering a stress test just yet. No anginal complaints, particularly with exercise.   2. Bradycardia: Not on any AV osvaldo blocking agents. Will follow the results of the event monitor. If significant bradycardia is noted on review of the monitor, will determine if a pacemaker is warranted.     Thank you for allowing me to participate in this patient's care. Please call with any questions or concerns. Mr. Figueroa will follow up with Dr. Castañeda in 3 months. Will call with results of event monitor. If anything abnormal is identified on the monitor, will see patient sooner.          Your medication list          Accurate as of March 28, 2022  1:08 PM. If you have any questions, ask your nurse or doctor.            CHANGE how you take these medications      Instructions Last Dose Given Next Dose Due   predniSONE 1 MG tablet  Commonly known as: DELTASONE  What changed: Another medication with the same name was removed. Continue taking this medication, and follow the directions you see here.  Changed by: SCOT Garcia      Take 1 mg by mouth Daily.          CONTINUE taking these medications      Instructions Last Dose Given Next Dose Due   fludrocortisone 0.1 MG tablet      TAKE 1 TABLET BY MOUTH ONCE A DAY TO PREVENT PASSING OUT AND LOW BLOOD PRESSURE       tadalafil 5 MG tablet  Commonly known as: CIALIS      Take 5 mg by mouth Daily.                SCOT Garcia  03/28/22  11:24 AM EDT

## 2022-04-05 ENCOUNTER — APPOINTMENT (OUTPATIENT)
Dept: BONE DENSITY | Facility: HOSPITAL | Age: 77
End: 2022-04-05

## 2022-05-05 ENCOUNTER — TELEPHONE (OUTPATIENT)
Dept: CARDIOLOGY | Facility: CLINIC | Age: 77
End: 2022-05-05

## 2022-05-05 NOTE — TELEPHONE ENCOUNTER
LVM regarding results of Holter monitor. No changes to be made at this time to the patient's treatment plan.

## 2022-05-10 ENCOUNTER — HOSPITAL ENCOUNTER (OUTPATIENT)
Dept: BONE DENSITY | Facility: HOSPITAL | Age: 77
Discharge: HOME | End: 2022-05-10
Admitting: INTERNAL MEDICINE

## 2022-05-10 DIAGNOSIS — Z79.52 ON PREDNISONE THERAPY: ICD-10-CM

## 2022-05-10 PROCEDURE — 77080 DXA BONE DENSITY AXIAL: CPT

## 2022-05-31 ENCOUNTER — LAB (OUTPATIENT)
Dept: LAB | Facility: HOSPITAL | Age: 77
End: 2022-05-31

## 2022-05-31 DIAGNOSIS — C61 PROSTATE CANCER: ICD-10-CM

## 2022-05-31 DIAGNOSIS — R97.21 RISING PSA FOLLOWING TREATMENT FOR MALIGNANT NEOPLASM OF PROSTATE: ICD-10-CM

## 2022-05-31 DIAGNOSIS — D37.3 LOW GRADE MUCINOUS NEOPLASM OF APPENDIX: ICD-10-CM

## 2022-05-31 LAB
ALBUMIN SERPL-MCNC: 4.1 G/DL (ref 3.5–5.2)
ALBUMIN/GLOB SERPL: 1.6 G/DL (ref 1.1–2.4)
ALP SERPL-CCNC: 80 U/L (ref 38–116)
ALT SERPL W P-5'-P-CCNC: 17 U/L (ref 0–41)
ANION GAP SERPL CALCULATED.3IONS-SCNC: 11 MMOL/L (ref 5–15)
AST SERPL-CCNC: 23 U/L (ref 0–40)
BASOPHILS # BLD AUTO: 0.03 10*3/MM3 (ref 0–0.2)
BASOPHILS NFR BLD AUTO: 0.6 % (ref 0–1.5)
BILIRUB SERPL-MCNC: 0.7 MG/DL (ref 0.2–1.2)
BUN SERPL-MCNC: 20 MG/DL (ref 6–20)
BUN/CREAT SERPL: 14.4 (ref 7.3–30)
CALCIUM SPEC-SCNC: 9.1 MG/DL (ref 8.5–10.2)
CEA SERPL-MCNC: 3.13 NG/ML
CHLORIDE SERPL-SCNC: 108 MMOL/L (ref 98–107)
CO2 SERPL-SCNC: 25 MMOL/L (ref 22–29)
CREAT SERPL-MCNC: 1.39 MG/DL (ref 0.7–1.3)
DEPRECATED RDW RBC AUTO: 42.1 FL (ref 37–54)
EGFRCR SERPLBLD CKD-EPI 2021: 53.2 ML/MIN/1.73
EOSINOPHIL # BLD AUTO: 0.08 10*3/MM3 (ref 0–0.4)
EOSINOPHIL NFR BLD AUTO: 1.7 % (ref 0.3–6.2)
ERYTHROCYTE [DISTWIDTH] IN BLOOD BY AUTOMATED COUNT: 14.2 % (ref 12.3–15.4)
GLOBULIN UR ELPH-MCNC: 2.5 GM/DL (ref 1.8–3.5)
GLUCOSE SERPL-MCNC: 146 MG/DL (ref 74–124)
HCT VFR BLD AUTO: 41.5 % (ref 37.5–51)
HGB BLD-MCNC: 13.1 G/DL (ref 13–17.7)
IMM GRANULOCYTES # BLD AUTO: 0.01 10*3/MM3 (ref 0–0.05)
IMM GRANULOCYTES NFR BLD AUTO: 0.2 % (ref 0–0.5)
LYMPHOCYTES # BLD AUTO: 1.06 10*3/MM3 (ref 0.7–3.1)
LYMPHOCYTES NFR BLD AUTO: 22.3 % (ref 19.6–45.3)
MCH RBC QN AUTO: 25.9 PG (ref 26.6–33)
MCHC RBC AUTO-ENTMCNC: 31.6 G/DL (ref 31.5–35.7)
MCV RBC AUTO: 82.2 FL (ref 79–97)
MONOCYTES # BLD AUTO: 0.35 10*3/MM3 (ref 0.1–0.9)
MONOCYTES NFR BLD AUTO: 7.4 % (ref 5–12)
NEUTROPHILS NFR BLD AUTO: 3.23 10*3/MM3 (ref 1.7–7)
NEUTROPHILS NFR BLD AUTO: 67.8 % (ref 42.7–76)
NRBC BLD AUTO-RTO: 0 /100 WBC (ref 0–0.2)
PLATELET # BLD AUTO: 164 10*3/MM3 (ref 140–450)
PMV BLD AUTO: 10.1 FL (ref 6–12)
POTASSIUM SERPL-SCNC: 4.2 MMOL/L (ref 3.5–4.7)
PROT SERPL-MCNC: 6.6 G/DL (ref 6.3–8)
PSA SERPL-MCNC: 0.32 NG/ML (ref 0–4)
RBC # BLD AUTO: 5.05 10*6/MM3 (ref 4.14–5.8)
SODIUM SERPL-SCNC: 144 MMOL/L (ref 134–145)
WBC NRBC COR # BLD: 4.76 10*3/MM3 (ref 3.4–10.8)

## 2022-05-31 PROCEDURE — 82378 CARCINOEMBRYONIC ANTIGEN: CPT | Performed by: INTERNAL MEDICINE

## 2022-05-31 PROCEDURE — 80053 COMPREHEN METABOLIC PANEL: CPT

## 2022-05-31 PROCEDURE — 85025 COMPLETE CBC W/AUTO DIFF WBC: CPT

## 2022-05-31 PROCEDURE — 36415 COLL VENOUS BLD VENIPUNCTURE: CPT

## 2022-05-31 PROCEDURE — 84153 ASSAY OF PSA TOTAL: CPT | Performed by: INTERNAL MEDICINE

## 2022-06-02 ENCOUNTER — TELEPHONE (OUTPATIENT)
Dept: ONCOLOGY | Facility: CLINIC | Age: 77
End: 2022-06-02

## 2022-06-07 ENCOUNTER — APPOINTMENT (OUTPATIENT)
Dept: LAB | Facility: HOSPITAL | Age: 77
End: 2022-06-07

## 2022-06-08 ENCOUNTER — APPOINTMENT (OUTPATIENT)
Dept: LAB | Facility: HOSPITAL | Age: 77
End: 2022-06-08

## 2022-06-08 ENCOUNTER — OFFICE VISIT (OUTPATIENT)
Dept: ONCOLOGY | Facility: CLINIC | Age: 77
End: 2022-06-08

## 2022-06-08 VITALS
SYSTOLIC BLOOD PRESSURE: 142 MMHG | OXYGEN SATURATION: 95 % | BODY MASS INDEX: 28.03 KG/M2 | TEMPERATURE: 97.3 F | RESPIRATION RATE: 20 BRPM | HEIGHT: 70 IN | HEART RATE: 99 BPM | WEIGHT: 195.8 LBS | DIASTOLIC BLOOD PRESSURE: 89 MMHG

## 2022-06-08 DIAGNOSIS — D37.3 LOW GRADE MUCINOUS NEOPLASM OF APPENDIX: ICD-10-CM

## 2022-06-08 DIAGNOSIS — C61 PROSTATE CANCER: Primary | ICD-10-CM

## 2022-06-08 PROCEDURE — 99214 OFFICE O/P EST MOD 30 MIN: CPT | Performed by: NURSE PRACTITIONER

## 2022-06-08 NOTE — PROGRESS NOTES
"Subjective     CHIEF COMPLAINT:      Chief Complaint   Patient presents with   • Follow-up       HISTORY OF PRESENT ILLNESS:     Danni Figueroa is a 76 y.o. male here today for lab review and follow-up on his appendix neoplasm and prostate cancer.  He reports he has been having issues with his blood pressure and has had a few syncopal episodes as a result of this.  He states he did pass out a few weeks ago.  Is currently being managed by Dr. Stallings.  Otherwise he denies issues with any new pain.  He reports his bowels are moving well.  He  has a good appetite.      ROS:  Pertinent ROS is in the HPI.     Past medical, surgical, social and family history were reviewed.     MEDICATIONS:    Current Outpatient Medications:   •  fludrocortisone 0.1 MG tablet, TAKE 1 TABLET BY MOUTH ONCE A DAY TO PREVENT PASSING OUT AND LOW BLOOD PRESSURE, Disp: , Rfl:   •  predniSONE (DELTASONE) 1 MG tablet, Take 1 mg by mouth Daily., Disp: , Rfl:   •  tadalafil (CIALIS) 5 MG tablet, Take 5 mg by mouth Daily., Disp: , Rfl:     Objective   VITAL SIGNS:     Vitals:    06/08/22 1316   BP: 142/89   Pulse: 99   Resp: 20   Temp: 97.3 °F (36.3 °C)   TempSrc: Temporal   SpO2: 95%   Weight: 88.8 kg (195 lb 12.8 oz)   Height: 177 cm (69.69\")   PainSc: 0-No pain     Body mass index is 28.35 kg/m².     Wt Readings from Last 5 Encounters:   06/08/22 88.8 kg (195 lb 12.8 oz)   03/28/22 91.2 kg (201 lb)   12/07/21 88.9 kg (195 lb 14.4 oz)   08/23/21 91.2 kg (201 lb)   08/10/21 91.4 kg (201 lb 6.4 oz)     Physical Exam  Vitals and nursing note reviewed.   Constitutional:       Appearance: Normal appearance. He is well-developed.   HENT:      Head: Normocephalic and atraumatic.      Nose: Nose normal.   Eyes:      Pupils: Pupils are equal, round, and reactive to light.   Cardiovascular:      Rate and Rhythm: Normal rate and regular rhythm.      Heart sounds: Normal heart sounds.   Pulmonary:      Effort: Pulmonary effort is normal. No respiratory " distress.      Breath sounds: Normal breath sounds. No wheezing, rhonchi or rales.   Abdominal:      General: Bowel sounds are normal. There is no distension.      Palpations: Abdomen is soft.      Tenderness: There is no abdominal tenderness.   Musculoskeletal:         General: Normal range of motion.      Cervical back: Normal range of motion and neck supple.   Skin:     General: Skin is warm and dry.   Neurological:      Mental Status: He is alert and oriented to person, place, and time.   Psychiatric:         Behavior: Behavior normal.         DIAGNOSTIC DATA:   Lab Results   Component Value Date    WBC 4.76 05/31/2022    HGB 13.1 05/31/2022    HCT 41.5 05/31/2022    MCV 82.2 05/31/2022     05/31/2022     Lab Results   Component Value Date    GLUCOSE 146 (H) 05/31/2022    BUN 20 05/31/2022    CREATININE 1.39 (H) 05/31/2022    EGFRIFAFRI 58 (L) 11/30/2021    BCR 14.4 05/31/2022    K 4.2 05/31/2022    CO2 25.0 05/31/2022    CALCIUM 9.1 05/31/2022    ALBUMIN 4.10 05/31/2022    AST 23 05/31/2022    ALT 17 05/31/2022       Lab Results   Component Value Date    CEA 3.13 05/31/2022    CEA 3.53 11/30/2021    CEA 4.02 12/15/2020    CEA 4.44 06/16/2020    CEA 4.22 12/13/2019      Lab Results   Component Value Date    PSA 0.323 05/31/2022    PSA 0.660 11/30/2021    PSA 0.347 12/15/2020    PSA 0.402 06/16/2020    PSA 0.321 12/13/2019          Assessment & Plan   *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   · He had right hemicolectomy on 06/19/2014. His baseline CEA was elevated at 8.9 on 6/22/14.   · He Initially had thickening at the area of the right paracolic gutter concerning for possible local recurrence. The CT scan from 01/19/2016 revealed resolution of this finding.   · CEA has slightly increased to 4.2 on 12/13/2019.  · CT scan of the abdomen pelvis on 12/27/2019 revealed no evidence of recurrence of the appendiceal mucinous neoplasm.  · CEA is currently at 3.53 11/30/2021  He is not having lower abdominal symptoms.  Abdominal exam is unremarkable.  · As of 5/31/2022 CEA 3.13.  Patient denies any abdominal symptoms.    *Prostate cancer diagnosed in 2014.    · Patient was treated with HIFU treatment in 2014.  · PSA Increased to 1.460 on 1/30/18 improved afterwards.    · PSA decreased to 0.347 on 12/15/2020.   · PSA increased to 0.660 today.   · He does not have symptoms of urinary tract infection.  He has increased energy frequency which is chronic and unchanged.  · I explained to the patient that the increase is concerning for recurrence of the prostate cancer.  · I recommended follow-up with his urologist.  I explained that he would most likely have imaging studies in the form of MRI.  I explained the options of treatment depending on the findings-surgery versus radiation therapy.  · Patient indicated that he will contact the urologist for follow-up.  · 5/31/2022 PSA was 0.323      PLAN:    1.  Return for follow-up visit in 6 months with Dr. Haider with repeat CBC, CMP, PSA, and CEA levels to be done 1 week prior.   2.  Call/return sooner should he develop any new concerns or problems.  Patient indicated that he will contact Dr. Chang for follow-up on the elevated PSA.  2.  I will see him in follow-up in 6 months.  1 week before his follow-up visit, I will obtain CBC CMP PSA and CEA levels.            Vanda Stallworth, APRN  06/08/22

## 2022-09-08 ENCOUNTER — OFFICE VISIT (OUTPATIENT)
Dept: CARDIOLOGY | Facility: CLINIC | Age: 77
End: 2022-09-08

## 2022-09-08 VITALS
HEART RATE: 76 BPM | SYSTOLIC BLOOD PRESSURE: 124 MMHG | WEIGHT: 196.6 LBS | DIASTOLIC BLOOD PRESSURE: 96 MMHG | HEIGHT: 70 IN | BODY MASS INDEX: 28.15 KG/M2 | OXYGEN SATURATION: 99 %

## 2022-09-08 DIAGNOSIS — R00.1 BRADYCARDIA, SINUS: Primary | ICD-10-CM

## 2022-09-08 DIAGNOSIS — R55 SYNCOPE AND COLLAPSE: ICD-10-CM

## 2022-09-08 PROCEDURE — 93000 ELECTROCARDIOGRAM COMPLETE: CPT | Performed by: INTERNAL MEDICINE

## 2022-09-08 PROCEDURE — 99214 OFFICE O/P EST MOD 30 MIN: CPT | Performed by: INTERNAL MEDICINE

## 2022-09-08 NOTE — PROGRESS NOTES
Subjective:     Encounter Date:09/08/2022      Patient ID: Danni Figueroa is a 76 y.o. male.    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  Patient's episode occurred about 2 weeks prior.  He reports that he was getting ready in his bathroom when he became lightheaded and passed out losing consciousness for a few seconds.  Fortunately did not injure himself.  He went and saw Dr. Stallings who had labs performed which were unremarkable.  He was given a prescription of fludrocortisone to take as needed.  Otherwise he was feeling well and is continuing to exercise without any significant issues.  An event monitor was recommended at that time which showed frequent PACs, 6 brief episodes of nonsustained supraventricular tachycardia, and one 12 beat episode of wide-complex tachycardia with a rate of 174 bpm which could either be ventricular tachycardia or aberrantly conducted SVT.  We decided to monitor him at that time.    He returns today for follow-up.  He indicates he has been taking the fludrocortisone on a daily basis.  He has not had any further syncopal episodes.  He does continue to have some lightheadedness with position changes but this is mild.  He indicates that his blood pressures for the most part are well controlled especially his systolic numbers.  On occasion he does report elevated diastolic pressures but no higher than the 90s.    He denies any chest pain, shortness of breath, palpitation, orthopnea, or lower extremity edema.  He continues to exercise on a regular basis walking about 3 or 4 miles a day without any significant issues.    Prior History:  I saw the  patient initially in 8/2021 when he presented for an evaluation of bradycardia and an abnormal EKG.  Prior to that office visit he was seen for routine follow-up by Dr. Stallings.    He reported occasional decline in his blood pressures but with no associated symptoms.  An EKG was performed in the office showing sinus rhythm with a heart rate of 48 bpm, and inferior lateral borderline T wave changes.  He was referred here for further evaluation.      At his office visit with me he reported a history of dizziness that he attributed to vertigo.  In fact he had an episode that took him to the emergency room in 4/2016.  At that time his EKG showed normal sinus rhythm with ventricular trigeminy and nonspecific inferior T wave changes.  Otherwise by the time of his office visit he denied any symptoms and was exercising on a regular basis without any problems.  I recommended proceeding with an echocardiogram.  I felt that his bradycardia was chronic and since he was asymptomatic did not feel that any further work-up was necessary at that time.  He underwent an echocardiogram on 8/23/2021 which showed normal left ventricular systolic function wall motion with an EF of 60 to 65%, mild concentric hypertrophy, normal diastolic function, no significant valvular disease.         Review of Systems   Constitutional: Negative for malaise/fatigue.   HENT: Negative for hearing loss, hoarse voice, nosebleeds and sore throat.    Eyes: Negative for pain.   Cardiovascular: Negative for chest pain, claudication, cyanosis, dyspnea on exertion, irregular heartbeat, leg swelling, near-syncope, orthopnea, palpitations, paroxysmal nocturnal dyspnea and syncope.   Respiratory: Negative for shortness of breath and snoring.    Endocrine: Negative for cold intolerance, heat intolerance, polydipsia, polyphagia and polyuria.   Skin: Negative for itching and rash.   Musculoskeletal: Negative for arthritis, falls, joint pain, joint swelling, muscle  cramps, muscle weakness and myalgias.   Gastrointestinal: Negative for constipation, diarrhea, dysphagia, heartburn, hematemesis, hematochezia, melena, nausea and vomiting.   Genitourinary: Negative for frequency, hematuria and hesitancy.   Neurological: Positive for light-headedness. Negative for excessive daytime sleepiness, dizziness, headaches, numbness and weakness.   Psychiatric/Behavioral: Negative for depression. The patient is not nervous/anxious.          Current Outpatient Medications:   •  fludrocortisone 0.1 MG tablet, TAKE 1 TABLET BY MOUTH ONCE A DAY TO PREVENT PASSING OUT AND LOW BLOOD PRESSURE, Disp: , Rfl:   •  predniSONE (DELTASONE) 1 MG tablet, Take 1 mg by mouth Daily., Disp: , Rfl:   •  tadalafil (CIALIS) 5 MG tablet, Take 5 mg by mouth Daily., Disp: , Rfl:     Past Medical History:   Diagnosis Date   • Diabetes mellitus (HCC)    • History of alcohol abuse    • Hypertension    • Low grade mucinous neoplasm of appendix    • Peripheral vertigo    • Prostate carcinoma (HCC)        Past Surgical History:   Procedure Laterality Date   • APPENDECTOMY  1993   • COLON SURGERY  06/2014    Right hemicolectomy-Pericecal mass   • COLONOSCOPY     • PROSTATE SURGERY  2014       Family History   Problem Relation Age of Onset   • Prostate cancer Brother    • Heart failure Brother    • Diabetes Other    • Heart failure Brother    • Heart failure Son        Social History     Tobacco Use   • Smoking status: Former Smoker     Packs/day: 1.00     Years: 10.00     Pack years: 10.00     Types: Cigarettes   • Smokeless tobacco: Never Used   • Tobacco comment: caffeine use   Substance Use Topics   • Alcohol use: No     Comment: 29 year former alcoholic    • Drug use: No         ECG 12 Lead    Date/Time: 9/8/2022 4:09 PM  Performed by: Carrie Castañeda MD  Authorized by: Carrie Castañeda MD   Comparison: compared with previous ECG   Similar to previous ECG  Rhythm: sinus rhythm  Ectopy: atrial premature  "contractions  Conduction: left anterior fascicular block               Objective:     Visit Vitals  /96 (BP Location: Right arm, Patient Position: Sitting, Cuff Size: Adult)   Pulse 76   Ht 177.8 cm (70\")   Wt 89.2 kg (196 lb 9.6 oz)   SpO2 99%   BMI 28.21 kg/m²         Constitutional:       Appearance: Normal appearance. Well-developed.   HENT:      Head: Normocephalic and atraumatic.   Neck:      Vascular: No carotid bruit or JVD.   Pulmonary:      Effort: Pulmonary effort is normal.      Breath sounds: Normal breath sounds.   Cardiovascular:      Normal rate. Regular rhythm.      No gallop.   Pulses:     Radial: 2+ bilaterally.  Edema:     Peripheral edema absent.   Abdominal:      Palpations: Abdomen is soft.   Skin:     General: Skin is warm and dry.   Neurological:      Mental Status: Alert and oriented to person, place, and time.             Assessment:          Diagnosis Plan   1. Bradycardia, sinus     2. Syncope and collapse            Plan:         1.  Syncope.  No recurrent episodes.  Work-up with an echocardiogram and an event monitor was unremarkable.  He is on fludrocortisone.  2.  History of bradycardia.  No evidence of sinus bradycardia at this time.  Recent event monitor showed no significant bradycardia.  3.  PACs.  Noted on his EKG today and also present frequently on his recent monitor.  He is asymptomatic.    Will plan on seeing the patient back again in 6 months.       "

## 2022-12-06 ENCOUNTER — LAB (OUTPATIENT)
Dept: LAB | Facility: HOSPITAL | Age: 77
End: 2022-12-06
Payer: MEDICARE

## 2022-12-06 DIAGNOSIS — D37.3 LOW GRADE MUCINOUS NEOPLASM OF APPENDIX: ICD-10-CM

## 2022-12-06 DIAGNOSIS — C61 PROSTATE CANCER: ICD-10-CM

## 2022-12-06 LAB
ALBUMIN SERPL-MCNC: 4.2 G/DL (ref 3.5–5.2)
ALBUMIN/GLOB SERPL: 1.4 G/DL (ref 1.1–2.4)
ALP SERPL-CCNC: 87 U/L (ref 38–116)
ALT SERPL W P-5'-P-CCNC: 11 U/L (ref 0–41)
ANION GAP SERPL CALCULATED.3IONS-SCNC: 11.5 MMOL/L (ref 5–15)
AST SERPL-CCNC: 19 U/L (ref 0–40)
BASOPHILS # BLD AUTO: 0.04 10*3/MM3 (ref 0–0.2)
BASOPHILS NFR BLD AUTO: 0.9 % (ref 0–1.5)
BILIRUB SERPL-MCNC: 0.9 MG/DL (ref 0.2–1.2)
BUN SERPL-MCNC: 20 MG/DL (ref 6–20)
BUN/CREAT SERPL: 15.6 (ref 7.3–30)
CALCIUM SPEC-SCNC: 9.4 MG/DL (ref 8.5–10.2)
CEA SERPL-MCNC: 3.51 NG/ML
CHLORIDE SERPL-SCNC: 106 MMOL/L (ref 98–107)
CO2 SERPL-SCNC: 25.5 MMOL/L (ref 22–29)
CREAT SERPL-MCNC: 1.28 MG/DL (ref 0.7–1.3)
DEPRECATED RDW RBC AUTO: 42.8 FL (ref 37–54)
EGFRCR SERPLBLD CKD-EPI 2021: 58.4 ML/MIN/1.73
EOSINOPHIL # BLD AUTO: 0.1 10*3/MM3 (ref 0–0.4)
EOSINOPHIL NFR BLD AUTO: 2.1 % (ref 0.3–6.2)
ERYTHROCYTE [DISTWIDTH] IN BLOOD BY AUTOMATED COUNT: 14.6 % (ref 12.3–15.4)
GLOBULIN UR ELPH-MCNC: 2.9 GM/DL (ref 1.8–3.5)
GLUCOSE SERPL-MCNC: 95 MG/DL (ref 74–124)
HCT VFR BLD AUTO: 44.6 % (ref 37.5–51)
HGB BLD-MCNC: 14.3 G/DL (ref 13–17.7)
IMM GRANULOCYTES # BLD AUTO: 0.02 10*3/MM3 (ref 0–0.05)
IMM GRANULOCYTES NFR BLD AUTO: 0.4 % (ref 0–0.5)
LYMPHOCYTES # BLD AUTO: 1.06 10*3/MM3 (ref 0.7–3.1)
LYMPHOCYTES NFR BLD AUTO: 22.7 % (ref 19.6–45.3)
MCH RBC QN AUTO: 26 PG (ref 26.6–33)
MCHC RBC AUTO-ENTMCNC: 32.1 G/DL (ref 31.5–35.7)
MCV RBC AUTO: 81.1 FL (ref 79–97)
MONOCYTES # BLD AUTO: 0.42 10*3/MM3 (ref 0.1–0.9)
MONOCYTES NFR BLD AUTO: 9 % (ref 5–12)
NEUTROPHILS NFR BLD AUTO: 3.03 10*3/MM3 (ref 1.7–7)
NEUTROPHILS NFR BLD AUTO: 64.9 % (ref 42.7–76)
NRBC BLD AUTO-RTO: 0 /100 WBC (ref 0–0.2)
PLATELET # BLD AUTO: 173 10*3/MM3 (ref 140–450)
PMV BLD AUTO: 10.6 FL (ref 6–12)
POTASSIUM SERPL-SCNC: 4 MMOL/L (ref 3.5–4.7)
PROT SERPL-MCNC: 7.1 G/DL (ref 6.3–8)
PSA SERPL-MCNC: 0.42 NG/ML (ref 0–4)
RBC # BLD AUTO: 5.5 10*6/MM3 (ref 4.14–5.8)
SODIUM SERPL-SCNC: 143 MMOL/L (ref 134–145)
WBC NRBC COR # BLD: 4.67 10*3/MM3 (ref 3.4–10.8)

## 2022-12-06 PROCEDURE — 36415 COLL VENOUS BLD VENIPUNCTURE: CPT

## 2022-12-06 PROCEDURE — 82378 CARCINOEMBRYONIC ANTIGEN: CPT | Performed by: INTERNAL MEDICINE

## 2022-12-06 PROCEDURE — 85025 COMPLETE CBC W/AUTO DIFF WBC: CPT

## 2022-12-06 PROCEDURE — 80053 COMPREHEN METABOLIC PANEL: CPT

## 2022-12-06 PROCEDURE — 84153 ASSAY OF PSA TOTAL: CPT | Performed by: INTERNAL MEDICINE

## 2022-12-13 ENCOUNTER — OFFICE VISIT (OUTPATIENT)
Dept: ONCOLOGY | Facility: CLINIC | Age: 77
End: 2022-12-13

## 2022-12-13 ENCOUNTER — APPOINTMENT (OUTPATIENT)
Dept: LAB | Facility: HOSPITAL | Age: 77
End: 2022-12-13
Payer: MEDICARE

## 2022-12-13 VITALS
WEIGHT: 193.7 LBS | TEMPERATURE: 97.4 F | SYSTOLIC BLOOD PRESSURE: 148 MMHG | OXYGEN SATURATION: 96 % | HEIGHT: 70 IN | RESPIRATION RATE: 16 BRPM | HEART RATE: 62 BPM | BODY MASS INDEX: 27.73 KG/M2 | DIASTOLIC BLOOD PRESSURE: 87 MMHG

## 2022-12-13 DIAGNOSIS — D37.3 LOW GRADE MUCINOUS NEOPLASM OF APPENDIX: ICD-10-CM

## 2022-12-13 DIAGNOSIS — C61 PROSTATE CANCER: Primary | ICD-10-CM

## 2022-12-13 PROCEDURE — 99213 OFFICE O/P EST LOW 20 MIN: CPT | Performed by: INTERNAL MEDICINE

## 2022-12-13 RX ORDER — DONEPEZIL HYDROCHLORIDE 5 MG/1
5 TABLET, FILM COATED ORAL DAILY
COMMUNITY
Start: 2022-09-20

## 2022-12-13 NOTE — PROGRESS NOTES
"Subjective     CHIEF COMPLAINT:      Chief Complaint   Patient presents with   • Follow-up     HISTORY OF PRESENT ILLNESS:     Danni Figueroa is a 77 y.o. male patient who returns today for follow up on his appendiceal tumor and prostate cancer.  He returns today for follow-up reporting that he is feeling well.  No abdominal pain.  No distention.  No blood in the urine or stool.    ROS:  Pertinent ROS is in the HPI.     Past medical, surgical, social and family history were reviewed.     MEDICATIONS:    Current Outpatient Medications:   •  donepezil (ARICEPT) 5 MG tablet, Take 5 mg by mouth Daily., Disp: , Rfl:   •  fludrocortisone 0.1 MG tablet, TAKE 1 TABLET BY MOUTH ONCE A DAY TO PREVENT PASSING OUT AND LOW BLOOD PRESSURE, Disp: , Rfl:   •  predniSONE (DELTASONE) 1 MG tablet, Take 1 mg by mouth Daily., Disp: , Rfl:   •  tadalafil (CIALIS) 5 MG tablet, Take 5 mg by mouth Daily., Disp: , Rfl:   Objective     VITAL SIGNS:     Vitals:    12/13/22 1435   BP: 148/87   Pulse: 62   Resp: 16   Temp: 97.4 °F (36.3 °C)   TempSrc: Temporal   SpO2: 96%   Weight: 87.9 kg (193 lb 11.2 oz)   Height: 177 cm (69.69\")   PainSc: 0-No pain     Body mass index is 28.04 kg/m².     Wt Readings from Last 5 Encounters:   12/13/22 87.9 kg (193 lb 11.2 oz)   09/08/22 89.2 kg (196 lb 9.6 oz)   06/08/22 88.8 kg (195 lb 12.8 oz)   03/28/22 91.2 kg (201 lb)   12/07/21 88.9 kg (195 lb 14.4 oz)     PHYSICAL EXAMINATION:   GENERAL: The patient appears in good general condition, not in acute distress.   SKIN: No ecchymosis.  EYES: No jaundice. No pallor.  LYMPHATICS: No cervical lymphadenopathy.  CHEST: Normal respiratory effort.   CVS: No edema.  ABDOMEN: Soft. No tenderness. No Hepatomegaly. No Splenomegaly. No masses.  EXTREMITIES: No noted deformity.     DIAGNOSTIC DATA:   Component      Latest Ref Rng & Units 12/6/2022   WBC      3.40 - 10.80 10*3/mm3 4.67   RBC      4.14 - 5.80 10*6/mm3 5.50   Hemoglobin      13.0 - 17.7 g/dL 14.3 "   Hematocrit      37.5 - 51.0 % 44.6   MCV      79.0 - 97.0 fL 81.1   MCH      26.6 - 33.0 pg 26.0 (L)   MCHC      31.5 - 35.7 g/dL 32.1   RDW      12.3 - 15.4 % 14.6   RDW-SD      37.0 - 54.0 fl 42.8   MPV      6.0 - 12.0 fL 10.6   Platelets      140 - 450 10*3/mm3 173   Neutrophil Rel %      42.7 - 76.0 % 64.9   Lymphocyte Rel %      19.6 - 45.3 % 22.7   Monocyte Rel %      5.0 - 12.0 % 9.0   Eosinophil Rel %      0.3 - 6.2 % 2.1   Basophil Rel %      0.0 - 1.5 % 0.9   Immature Granulocyte Rel %      0.0 - 0.5 % 0.4   Neutrophils Absolute      1.70 - 7.00 10*3/mm3 3.03   Lymphocytes Absolute      0.70 - 3.10 10*3/mm3 1.06   Monocytes Absolute      0.10 - 0.90 10*3/mm3 0.42   Eosinophils Absolute      0.00 - 0.40 10*3/mm3 0.10   Basophils Absolute      0.00 - 0.20 10*3/mm3 0.04   Immature Grans, Absolute      0.00 - 0.05 10*3/mm3 0.02     Component      Latest Ref Rng & Units 12/6/2022   Glucose      74 - 124 mg/dL 95   BUN      6 - 20 mg/dL 20   Creatinine      0.70 - 1.30 mg/dL 1.28   Sodium      134 - 145 mmol/L 143   Potassium      3.5 - 4.7 mmol/L 4.0   Chloride      98 - 107 mmol/L 106   CO2      22.0 - 29.0 mmol/L 25.5   Calcium      8.5 - 10.2 mg/dL 9.4   Total Protein      6.3 - 8.0 g/dL 7.1   Albumin      3.50 - 5.20 g/dL 4.20   ALT (SGPT)      0 - 41 U/L 11   AST (SGOT)      0 - 40 U/L 19   Alkaline Phosphatase      38 - 116 U/L 87   Total Bilirubin      0.2 - 1.2 mg/dL 0.9   Globulin      1.8 - 3.5 gm/dL 2.9   A/G Ratio      1.1 - 2.4 g/dL 1.4   BUN/Creatinine Ratio      7.3 - 30.0 15.6   Anion Gap      5.0 - 15.0 mmol/L 11.5   eGFR      >60.0 mL/min/1.73 58.4 (L)     Lab Results   Component Value Date    CEA 3.51 12/06/2022    CEA 3.13 05/31/2022    CEA 3.53 11/30/2021    CEA 4.02 12/15/2020    CEA 4.44 06/16/2020     Lab Results   Component Value Date    PSA 0.425 12/06/2022    PSA 0.323 05/31/2022    PSA 0.660 11/30/2021    PSA 0.347 12/15/2020    PSA 0.402 06/16/2020      Assessment & Plan     *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   · He had right hemicolectomy on 06/19/2014. His baseline CEA was elevated at 8.9 on 6/22/14.   · He Initially had thickening at the area of the right paracolic gutter concerning for possible local recurrence. The CT scan from 01/19/2016 revealed resolution of this finding.   · CEA has slightly increased to 4.2 on 12/13/2019.  · CT scan of the abdomen pelvis on 12/27/2019 revealed no evidence of recurrence of the appendiceal mucinous neoplasm.  · CEA was 3.51 on 12/6/2022.  · Abdominal exam revealed no masses.  No distention or ascites.    *Prostate cancer diagnosed in 2014.    · Patient was treated with HIFU treatment in 2014.  · PSA Increased to 1.460 on 1/30/18 improved afterwards.    · PSA decreased to 0.347 on 12/15/2020.   · PSA increased to 0.660 on 11/30/2021.  · PSA decreased to 0.323 on 5/31/2022.  · PSA was 0.425 on 12/6/2022.  · PSA is considered to be stable.    PLAN:    1.   Since his tumor markers are stable and since he is not having symptoms, I recommended increasing the interval between the labs to 1 year.  2.  We will see him in follow-up in 1 year.  1 week before his follow-up visit, we will obtain CBC CMP CEA and PSA.  We will see him sooner if there are any changes.        Katheryn Haider MD  12/13/22

## 2023-03-16 ENCOUNTER — OFFICE VISIT (OUTPATIENT)
Dept: CARDIOLOGY | Facility: CLINIC | Age: 78
End: 2023-03-16
Payer: MEDICARE

## 2023-03-16 VITALS
BODY MASS INDEX: 27.77 KG/M2 | WEIGHT: 194 LBS | HEIGHT: 70 IN | SYSTOLIC BLOOD PRESSURE: 110 MMHG | DIASTOLIC BLOOD PRESSURE: 86 MMHG | HEART RATE: 55 BPM

## 2023-03-16 DIAGNOSIS — R55 SYNCOPE AND COLLAPSE: ICD-10-CM

## 2023-03-16 DIAGNOSIS — R00.1 BRADYCARDIA, SINUS: Primary | ICD-10-CM

## 2023-03-16 DIAGNOSIS — I49.1 ATRIAL CONTRACTIONS, PREMATURE: ICD-10-CM

## 2023-03-16 PROCEDURE — 93000 ELECTROCARDIOGRAM COMPLETE: CPT | Performed by: NURSE PRACTITIONER

## 2023-03-16 PROCEDURE — 99213 OFFICE O/P EST LOW 20 MIN: CPT | Performed by: NURSE PRACTITIONER

## 2023-03-16 NOTE — PROGRESS NOTES
Subjective:     Encounter Date:03/16/2023      Patient ID: Danni Figueroa is a 77 y.o. male.    Chief Complaint: Bradycardia  History of Present Illness  This is a 77 y/o man who follows with Dr. Castañeda and I have seen in the past. 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 He was initially seen here in the office by Dr. Castañeda for and EKG performed at his PCP's office showing a heart rate of 48 bpm and inferior lateral borderline T wave changes. He was asymptomatic with his bradycardia. He had no complaints of anginal symptoms warranting any invasive testing for the T wave abnormalities noted on his EKG. He underwent an echocardiogram on 8/10/21 demonstrating an EF of 61-65% with no significant valvular heart disease.    I saw him in March 2022 and he had just experienced a syncopal event at that time. I placed an ambulatory monitor on him that did not show any significant arrhythmias. He then saw Dr. Castañeda in September and was feeling well. No changes were made.     He is here today for a follow up visit. He continues to do well with no further syncopal events. He has PRN fludrocortisone to take for hypotension and he takes this about every other day. If his blood pressure goes below 120, he usually takes a pill. He denies any chest pain, dyspnea, palpitations, dizziness or syncope. He denies swelling in his legs, orthopnea or PND. He climbed the steps to get into the office today with no issues.    I have reviewed and updated as appropriate allergies, current medications, past family history, past medical history, past surgical history and problem list.    Review of Systems   Constitutional: Negative for fever, malaise/fatigue, weight gain and weight loss.   HENT: Negative for congestion, hoarse voice and sore throat.    Eyes: Negative for blurred vision and double vision.    Cardiovascular: Negative for chest pain, dyspnea on exertion, leg swelling, orthopnea, palpitations and syncope.   Respiratory: Negative for cough, shortness of breath and wheezing.    Gastrointestinal: Negative for abdominal pain, hematemesis, hematochezia and melena.   Genitourinary: Negative for dysuria and hematuria.   Neurological: Negative for headaches, light-headedness and numbness.   Psychiatric/Behavioral: Positive for memory loss. Negative for depression. The patient is not nervous/anxious.          Current Outpatient Medications:   •  donepezil (ARICEPT) 5 MG tablet, Take 5 mg by mouth Daily., Disp: , Rfl:   •  fludrocortisone 0.1 MG tablet, TAKE 1 TABLET BY MOUTH ONCE A DAY TO PREVENT PASSING OUT AND LOW BLOOD PRESSURE, Disp: , Rfl:   •  predniSONE (DELTASONE) 1 MG tablet, Take 1 mg by mouth Daily., Disp: , Rfl:   •  tadalafil (CIALIS) 5 MG tablet, Take 5 mg by mouth Daily., Disp: , Rfl:     Past Medical History:   Diagnosis Date   • Diabetes mellitus (HCC)    • History of alcohol abuse    • Hypertension    • Low grade mucinous neoplasm of appendix    • Peripheral vertigo    • Prostate carcinoma (HCC)        Past Surgical History:   Procedure Laterality Date   • APPENDECTOMY  1993   • COLON SURGERY  06/2014    Right hemicolectomy-Pericecal mass   • COLONOSCOPY     • PROSTATE SURGERY  2014       Family History   Problem Relation Age of Onset   • Prostate cancer Brother    • Heart failure Brother    • Diabetes Other    • Heart failure Brother    • Heart failure Son        Social History     Tobacco Use   • Smoking status: Former     Packs/day: 1.00     Years: 10.00     Pack years: 10.00     Types: Cigarettes   • Smokeless tobacco: Never   • Tobacco comments:     caffeine use   Substance Use Topics   • Alcohol use: No     Comment: 29 year former alcoholic    • Drug use: No         ECG 12 Lead    Date/Time: 3/16/2023 1:25 PM  Performed by: Tea Mcfadden APRN  Authorized by: Tea Mcfadden APRN  "  Comparison: compared with previous ECG from 9/8/2022  Similar to previous ECG  Rhythm: sinus rhythm  Ectopy: atrial premature contractions  Comments: No significant change from previous EKG               Objective:     Visit Vitals  /86   Pulse 55   Ht 177 cm (69.69\")   Wt 88 kg (194 lb)   BMI 28.08 kg/m²             Physical Exam  Constitutional:       Appearance: Normal appearance. He is normal weight.   HENT:      Head: Normocephalic and atraumatic.   Neck:      Vascular: No carotid bruit.   Cardiovascular:      Rate and Rhythm: Normal rate and regular rhythm.      Chest Wall: PMI is not displaced.      Pulses: Normal pulses.           Radial pulses are 2+ on the right side and 2+ on the left side.        Posterior tibial pulses are 2+ on the right side and 2+ on the left side.      Heart sounds: S1 normal and S2 normal. No murmur heard.    No friction rub. No gallop.   Pulmonary:      Effort: Pulmonary effort is normal.      Breath sounds: Normal breath sounds.   Abdominal:      General: Bowel sounds are normal. There is no distension.      Palpations: Abdomen is soft.   Musculoskeletal:      Right lower leg: No edema.      Left lower leg: No edema.   Skin:     General: Skin is warm and dry.      Capillary Refill: Capillary refill takes less than 2 seconds.   Neurological:      Mental Status: He is alert and oriented to person, place, and time.   Psychiatric:         Mood and Affect: Mood normal.         Behavior: Behavior normal.         Thought Content: Thought content normal.          Lab Review:         Cardiac Procedures:   1.     Assessment:         Diagnoses and all orders for this visit:    1. Bradycardia, sinus (Primary)    2. Syncope and collapse    Other orders  -     ECG 12 Lead            Plan:       1. Syncope and collapse: No recurrent episodes. EKG is stable today. Holter monitor did not show any significant arrhythmias. Will continue with fludrocortisone.  2. H/o bradycardia: no further " bradycardic events reported. In sinus rhythm on EKG today. No changes.  3. PACs: asymptomatic. Will monitor.    Thank you for allowing me to participate in this patient's care. Please call with any questions or concerns. Mr. Figueroa will follow up with Dr. Castañeda in 6 months.       Your medication list          Accurate as of March 16, 2023  3:28 PM. If you have any questions, ask your nurse or doctor.            CONTINUE taking these medications      Instructions Last Dose Given Next Dose Due   donepezil 5 MG tablet  Commonly known as: ARICEPT      Take 5 mg by mouth Daily.       fludrocortisone 0.1 MG tablet      TAKE 1 TABLET BY MOUTH ONCE A DAY TO PREVENT PASSING OUT AND LOW BLOOD PRESSURE       predniSONE 1 MG tablet  Commonly known as: DELTASONE      Take 1 mg by mouth Daily.       tadalafil 5 MG tablet  Commonly known as: CIALIS      Take 5 mg by mouth Daily.                Tea Mcfadden, SCOT  03/16/23  11:24 AM EDT

## 2023-04-19 ENCOUNTER — TRANSCRIBE ORDERS (OUTPATIENT)
Dept: ADMINISTRATIVE | Facility: HOSPITAL | Age: 78
End: 2023-04-19
Payer: MEDICARE

## 2023-04-19 DIAGNOSIS — R51.9 FREQUENT HEADACHES: ICD-10-CM

## 2023-04-19 DIAGNOSIS — R26.89 BALANCE PROBLEMS: Primary | ICD-10-CM

## 2023-04-19 DIAGNOSIS — R41.3 MEMORY LOSS: ICD-10-CM

## 2023-04-19 DIAGNOSIS — R42 DIZZINESS: ICD-10-CM

## 2023-04-27 ENCOUNTER — APPOINTMENT (OUTPATIENT)
Dept: GENERAL RADIOLOGY | Facility: HOSPITAL | Age: 78
DRG: 872 | End: 2023-04-27
Payer: MEDICARE

## 2023-04-27 ENCOUNTER — HOSPITAL ENCOUNTER (INPATIENT)
Facility: HOSPITAL | Age: 78
LOS: 3 days | Discharge: HOME | DRG: 872 | End: 2023-05-03
Attending: EMERGENCY MEDICINE | Admitting: HOSPITALIST
Payer: MEDICARE

## 2023-04-27 ENCOUNTER — HOSPITAL ENCOUNTER (OUTPATIENT)
Dept: MRI IMAGING | Facility: HOSPITAL | Age: 78
Discharge: HOME | DRG: 872 | End: 2023-04-27
Payer: MEDICARE

## 2023-04-27 ENCOUNTER — APPOINTMENT (OUTPATIENT)
Dept: OTHER | Facility: HOSPITAL | Age: 78
DRG: 872 | End: 2023-04-27
Payer: MEDICARE

## 2023-04-27 DIAGNOSIS — R41.3 MEMORY LOSS: ICD-10-CM

## 2023-04-27 DIAGNOSIS — R55 SYNCOPE, UNSPECIFIED SYNCOPE TYPE: Primary | ICD-10-CM

## 2023-04-27 DIAGNOSIS — Z09 FOLLOW-UP EXAM: ICD-10-CM

## 2023-04-27 DIAGNOSIS — R51.9 FREQUENT HEADACHES: ICD-10-CM

## 2023-04-27 DIAGNOSIS — R26.89 BALANCE PROBLEMS: ICD-10-CM

## 2023-04-27 DIAGNOSIS — R42 DIZZINESS: ICD-10-CM

## 2023-04-27 LAB
ALBUMIN SERPL-MCNC: 3.7 G/DL (ref 3.5–5.2)
ALBUMIN/GLOB SERPL: 1.2 G/DL
ALP SERPL-CCNC: 84 U/L (ref 39–117)
ALT SERPL W P-5'-P-CCNC: 12 U/L (ref 1–41)
ANION GAP SERPL CALCULATED.3IONS-SCNC: 7.9 MMOL/L (ref 5–15)
APTT PPP: 24 SECONDS (ref 22.7–35.4)
AST SERPL-CCNC: 15 U/L (ref 1–40)
BASOPHILS # BLD AUTO: 0.04 10*3/MM3 (ref 0–0.2)
BASOPHILS NFR BLD AUTO: 0.5 % (ref 0–1.5)
BILIRUB SERPL-MCNC: <0.2 MG/DL (ref 0–1.2)
BUN SERPL-MCNC: 17 MG/DL (ref 8–23)
BUN/CREAT SERPL: 10.8 (ref 7–25)
CALCIUM SPEC-SCNC: 9.5 MG/DL (ref 8.6–10.5)
CHLORIDE SERPL-SCNC: 105 MMOL/L (ref 98–107)
CO2 SERPL-SCNC: 26.1 MMOL/L (ref 22–29)
CREAT SERPL-MCNC: 1.57 MG/DL (ref 0.76–1.27)
DEPRECATED RDW RBC AUTO: 39.2 FL (ref 37–54)
EGFRCR SERPLBLD CKD-EPI 2021: 45.7 ML/MIN/1.73
EOSINOPHIL # BLD AUTO: 0.11 10*3/MM3 (ref 0–0.4)
EOSINOPHIL NFR BLD AUTO: 1.3 % (ref 0.3–6.2)
ERYTHROCYTE [DISTWIDTH] IN BLOOD BY AUTOMATED COUNT: 13.7 % (ref 12.3–15.4)
GEN 5 2HR TROPONIN T REFLEX: 33 NG/L
GLOBULIN UR ELPH-MCNC: 3 GM/DL
GLUCOSE SERPL-MCNC: 168 MG/DL (ref 65–99)
HCT VFR BLD AUTO: 38.6 % (ref 37.5–51)
HGB BLD-MCNC: 12.4 G/DL (ref 13–17.7)
IMM GRANULOCYTES # BLD AUTO: 0.05 10*3/MM3 (ref 0–0.05)
IMM GRANULOCYTES NFR BLD AUTO: 0.6 % (ref 0–0.5)
INR PPP: 1.1 (ref 0.9–1.1)
LYMPHOCYTES # BLD AUTO: 1.51 10*3/MM3 (ref 0.7–3.1)
LYMPHOCYTES NFR BLD AUTO: 17.4 % (ref 19.6–45.3)
MAGNESIUM SERPL-MCNC: 2.3 MG/DL (ref 1.6–2.4)
MCH RBC QN AUTO: 26 PG (ref 26.6–33)
MCHC RBC AUTO-ENTMCNC: 32.1 G/DL (ref 31.5–35.7)
MCV RBC AUTO: 80.9 FL (ref 79–97)
MONOCYTES # BLD AUTO: 0.53 10*3/MM3 (ref 0.1–0.9)
MONOCYTES NFR BLD AUTO: 6.1 % (ref 5–12)
NEUTROPHILS NFR BLD AUTO: 6.43 10*3/MM3 (ref 1.7–7)
NEUTROPHILS NFR BLD AUTO: 74.1 % (ref 42.7–76)
NRBC BLD AUTO-RTO: 0.1 /100 WBC (ref 0–0.2)
NT-PROBNP SERPL-MCNC: 171 PG/ML (ref 0–1800)
PLATELET # BLD AUTO: 301 10*3/MM3 (ref 140–450)
PMV BLD AUTO: 9.7 FL (ref 6–12)
POTASSIUM SERPL-SCNC: 4.3 MMOL/L (ref 3.5–5.2)
PROT SERPL-MCNC: 6.7 G/DL (ref 6–8.5)
PROTHROMBIN TIME: 14.3 SECONDS (ref 11.7–14.2)
RBC # BLD AUTO: 4.77 10*6/MM3 (ref 4.14–5.8)
SODIUM SERPL-SCNC: 139 MMOL/L (ref 136–145)
TROPONIN T DELTA: 7 NG/L
TROPONIN T SERPL HS-MCNC: 26 NG/L
WBC NRBC COR # BLD: 8.67 10*3/MM3 (ref 3.4–10.8)

## 2023-04-27 PROCEDURE — 93005 ELECTROCARDIOGRAM TRACING: CPT | Performed by: EMERGENCY MEDICINE

## 2023-04-27 PROCEDURE — 71045 X-RAY EXAM CHEST 1 VIEW: CPT

## 2023-04-27 PROCEDURE — 83880 ASSAY OF NATRIURETIC PEPTIDE: CPT | Performed by: EMERGENCY MEDICINE

## 2023-04-27 PROCEDURE — 99285 EMERGENCY DEPT VISIT HI MDM: CPT

## 2023-04-27 PROCEDURE — 80053 COMPREHEN METABOLIC PANEL: CPT | Performed by: EMERGENCY MEDICINE

## 2023-04-27 PROCEDURE — 85025 COMPLETE CBC W/AUTO DIFF WBC: CPT | Performed by: EMERGENCY MEDICINE

## 2023-04-27 PROCEDURE — 85730 THROMBOPLASTIN TIME PARTIAL: CPT | Performed by: EMERGENCY MEDICINE

## 2023-04-27 PROCEDURE — 70553 MRI BRAIN STEM W/O & W/DYE: CPT

## 2023-04-27 PROCEDURE — 84484 ASSAY OF TROPONIN QUANT: CPT | Performed by: EMERGENCY MEDICINE

## 2023-04-27 PROCEDURE — A9577 INJ MULTIHANCE: HCPCS | Performed by: INTERNAL MEDICINE

## 2023-04-27 PROCEDURE — 93005 ELECTROCARDIOGRAM TRACING: CPT

## 2023-04-27 PROCEDURE — 36415 COLL VENOUS BLD VENIPUNCTURE: CPT

## 2023-04-27 PROCEDURE — 93010 ELECTROCARDIOGRAM REPORT: CPT | Performed by: INTERNAL MEDICINE

## 2023-04-27 PROCEDURE — 82565 ASSAY OF CREATININE: CPT

## 2023-04-27 PROCEDURE — 83735 ASSAY OF MAGNESIUM: CPT | Performed by: EMERGENCY MEDICINE

## 2023-04-27 PROCEDURE — 85610 PROTHROMBIN TIME: CPT | Performed by: EMERGENCY MEDICINE

## 2023-04-27 PROCEDURE — 0 GADOBENATE DIMEGLUMINE 529 MG/ML SOLUTION: Performed by: INTERNAL MEDICINE

## 2023-04-27 RX ORDER — SODIUM CHLORIDE 0.9 % (FLUSH) 0.9 %
10 SYRINGE (ML) INJECTION AS NEEDED
Status: DISCONTINUED | OUTPATIENT
Start: 2023-04-27 | End: 2023-05-03 | Stop reason: HOSPADM

## 2023-04-27 RX ADMIN — GADOBENATE DIMEGLUMINE 20 ML: 529 INJECTION, SOLUTION INTRAVENOUS at 10:28

## 2023-04-28 LAB
CREAT BLDA-MCNC: 1.7 MG/DL (ref 0.6–1.3)
QT INTERVAL: 318 MS

## 2023-04-28 PROCEDURE — G0378 HOSPITAL OBSERVATION PER HR: HCPCS

## 2023-04-28 PROCEDURE — 87186 SC STD MICRODIL/AGAR DIL: CPT | Performed by: HOSPITALIST

## 2023-04-28 PROCEDURE — 81001 URINALYSIS AUTO W/SCOPE: CPT | Performed by: HOSPITALIST

## 2023-04-28 PROCEDURE — 97165 OT EVAL LOW COMPLEX 30 MIN: CPT

## 2023-04-28 PROCEDURE — 99222 1ST HOSP IP/OBS MODERATE 55: CPT | Performed by: INTERNAL MEDICINE

## 2023-04-28 PROCEDURE — 87088 URINE BACTERIA CULTURE: CPT | Performed by: HOSPITALIST

## 2023-04-28 PROCEDURE — 87086 URINE CULTURE/COLONY COUNT: CPT | Performed by: HOSPITALIST

## 2023-04-28 PROCEDURE — 25010000002 PIPERACILLIN SOD-TAZOBACTAM PER 1 G: Performed by: HOSPITALIST

## 2023-04-28 PROCEDURE — 87040 BLOOD CULTURE FOR BACTERIA: CPT | Performed by: HOSPITALIST

## 2023-04-28 PROCEDURE — 97530 THERAPEUTIC ACTIVITIES: CPT

## 2023-04-28 PROCEDURE — 63710000001 PREDNISONE PER 5 MG: Performed by: HOSPITALIST

## 2023-04-28 PROCEDURE — 87150 DNA/RNA AMPLIFIED PROBE: CPT | Performed by: HOSPITALIST

## 2023-04-28 PROCEDURE — 97161 PT EVAL LOW COMPLEX 20 MIN: CPT

## 2023-04-28 PROCEDURE — 87077 CULTURE AEROBIC IDENTIFY: CPT | Performed by: HOSPITALIST

## 2023-04-28 RX ORDER — PANTOPRAZOLE SODIUM 40 MG/1
40 TABLET, DELAYED RELEASE ORAL
Status: DISCONTINUED | OUTPATIENT
Start: 2023-04-28 | End: 2023-05-03 | Stop reason: HOSPADM

## 2023-04-28 RX ORDER — SODIUM CHLORIDE 9 MG/ML
50 INJECTION, SOLUTION INTRAVENOUS CONTINUOUS
Status: DISCONTINUED | OUTPATIENT
Start: 2023-04-28 | End: 2023-05-02

## 2023-04-28 RX ORDER — MIDODRINE HYDROCHLORIDE 2.5 MG/1
2.5 TABLET ORAL
Status: DISCONTINUED | OUTPATIENT
Start: 2023-04-28 | End: 2023-04-28

## 2023-04-28 RX ORDER — MECLIZINE HYDROCHLORIDE 25 MG/1
25 TABLET ORAL 3 TIMES DAILY PRN
COMMUNITY
Start: 2023-01-06

## 2023-04-28 RX ORDER — FLUDROCORTISONE ACETATE 0.1 MG/1
100 TABLET ORAL DAILY
Status: DISCONTINUED | OUTPATIENT
Start: 2023-04-28 | End: 2023-04-28

## 2023-04-28 RX ORDER — DONEPEZIL HYDROCHLORIDE 5 MG/1
5 TABLET, FILM COATED ORAL DAILY
Status: DISCONTINUED | OUTPATIENT
Start: 2023-04-28 | End: 2023-05-03 | Stop reason: HOSPADM

## 2023-04-28 RX ORDER — MECLIZINE HYDROCHLORIDE 25 MG/1
25 TABLET ORAL 3 TIMES DAILY PRN
Status: DISCONTINUED | OUTPATIENT
Start: 2023-04-28 | End: 2023-05-03

## 2023-04-28 RX ORDER — MIDODRINE HYDROCHLORIDE 5 MG/1
5 TABLET ORAL
Status: DISCONTINUED | OUTPATIENT
Start: 2023-04-28 | End: 2023-05-03

## 2023-04-28 RX ORDER — PREDNISONE 1 MG/1
1 TABLET ORAL
Status: DISCONTINUED | OUTPATIENT
Start: 2023-04-28 | End: 2023-05-03 | Stop reason: HOSPADM

## 2023-04-28 RX ADMIN — TAZOBACTAM SODIUM AND PIPERACILLIN SODIUM 3.38 G: 375; 3 INJECTION, SOLUTION INTRAVENOUS at 22:29

## 2023-04-28 RX ADMIN — FLUDROCORTISONE ACETATE 100 MCG: 0.1 TABLET ORAL at 09:17

## 2023-04-28 RX ADMIN — SODIUM CHLORIDE 75 ML/HR: 9 INJECTION, SOLUTION INTRAVENOUS at 06:21

## 2023-04-28 RX ADMIN — PREDNISONE 1 MG: 1 TABLET ORAL at 09:17

## 2023-04-28 RX ADMIN — SODIUM CHLORIDE 125 ML/HR: 9 INJECTION, SOLUTION INTRAVENOUS at 18:45

## 2023-04-28 RX ADMIN — PANTOPRAZOLE SODIUM 40 MG: 40 TABLET, DELAYED RELEASE ORAL at 06:21

## 2023-04-28 RX ADMIN — MIDODRINE HYDROCHLORIDE 5 MG: 5 TABLET ORAL at 18:45

## 2023-04-28 NOTE — PLAN OF CARE
Goal Outcome Evaluation:  Plan of Care Reviewed With: patient, spouse        Progress: no change  Outcome Evaluation: Pt is a 76 yo male admitted from home with c/o syncopal episode and N/V. Hx severe OH, prostate cancer. Pt seen for OT MERCEDES contreras&Joy3, (I) at baseline with ADLs and mobility. Pt moving well this date, SBA for bed mob, STS and mob with SBA and no AD, no concerns for ADL task completion. Pt with orthostatics assessed. Sitting 130/82, standing 1 min 114/77, standing 3 mins 104/86. Pt with no reports of dizziness, educated on no acute OT needs during stay, will sign off. Family and RN aware.

## 2023-04-28 NOTE — SIGNIFICANT NOTE
Orthostatic BP below, SBP dropped by 31 and HR increased by 25 sitting to standing       04/28/23 0918 04/28/23 0923 04/28/23 0928   Vital Signs   Heart Rate 59 58 83   Heart Rate Source Monitor Monitor Monitor   /74 108/75 (!) 77/58   Noninvasive MAP (mmHg) 91 86 63   BP Location Right arm Right arm Right arm   BP Method Automatic Automatic Automatic   Patient Position Lying Sitting Standing   Oxygen Therapy   SpO2 97 % 97 % 97 %   Pulse Oximetry Type Continuous Continuous Continuous   Device (Oxygen Therapy) room air room air room air

## 2023-04-28 NOTE — PLAN OF CARE
Bradycardic. Denies complaints. No issues this shift. Wife at bedside.     Problem: Adult Inpatient Plan of Care  Goal: Plan of Care Review  Outcome: Ongoing, Progressing  Flowsheets (Taken 4/28/2023 0255)  Plan of Care Reviewed With:   patient   spouse  Goal: Patient-Specific Goal (Individualized)  Outcome: Ongoing, Progressing  Goal: Absence of Hospital-Acquired Illness or Injury  Outcome: Ongoing, Progressing  Intervention: Identify and Manage Fall Risk  Recent Flowsheet Documentation  Taken 4/28/2023 0126 by Kiera Rivera RN  Safety Promotion/Fall Prevention:   assistive device/personal items within reach   clutter free environment maintained   activity supervised   fall prevention program maintained   nonskid shoes/slippers when out of bed   safety round/check completed  Intervention: Prevent Skin Injury  Recent Flowsheet Documentation  Taken 4/28/2023 0126 by Kiera Rivera RN  Body Position: position changed independently  Intervention: Prevent and Manage VTE (Venous Thromboembolism) Risk  Recent Flowsheet Documentation  Taken 4/28/2023 0126 by Kiera Rivera RN  Activity Management: activity encouraged  Goal: Optimal Comfort and Wellbeing  Outcome: Ongoing, Progressing  Intervention: Provide Person-Centered Care  Recent Flowsheet Documentation  Taken 4/28/2023 0126 by Kiera Rivera RN  Trust Relationship/Rapport: care explained  Goal: Readiness for Transition of Care  Outcome: Ongoing, Progressing  Intervention: Mutually Develop Transition Plan  Recent Flowsheet Documentation  Taken 4/28/2023 0127 by Kiera Rivera RN  Transportation Anticipated: family or friend will provide  Patient/Family Anticipated Services at Transition: none  Patient/Family Anticipates Transition to: home with family  Taken 4/28/2023 0123 by Kiera Rivera RN  Equipment Currently Used at Home:   glucometer   bp cuff     Problem: Fall Injury Risk  Goal: Absence of Fall and Fall-Related Injury  Outcome: Ongoing,  Progressing  Intervention: Promote Injury-Free Environment  Recent Flowsheet Documentation  Taken 4/28/2023 0126 by Kiera Rivera, RN  Safety Promotion/Fall Prevention:   assistive device/personal items within reach   clutter free environment maintained   activity supervised   fall prevention program maintained   nonskid shoes/slippers when out of bed   safety round/check completed     Problem: Hypertension Comorbidity  Goal: Blood Pressure in Desired Range  Outcome: Ongoing, Progressing     Problem: Syncope  Goal: Absence of Syncopal Symptoms  Outcome: Ongoing, Progressing   Goal Outcome Evaluation:

## 2023-04-28 NOTE — CONSULTS
"Nutrition Services    Patient Name:  Danni Figueroa  YOB: 1945  MRN: [id number]  Admit Date:  4/27/2023    Assessment Date:  04/28/23    Comment: Visited patient at bedside who is alert and oriented x 4. Patient was admitted after syncopal episode at [facility]. He reports active lifestyle and had just walked 3 miles the day before his incident. Patient reports unintentional weight loss x 90 days. He has lost 11# (6%) x 1 month. He reports eating 2 meals per day. Patient declined ONS. He plans to go home within the next 24 hours. RD to sign off at this time. Please consult if further needs arise        CLINICAL NUTRITION ASSESSMENT      Reason for Assessment Nurse Admission Screen     Diagnosis/Problem   Syncope    Medical/Surgical History Past Medical History:   Diagnosis Date   • Diabetes mellitus    • History of alcohol abuse    • Hypertension    • Low grade mucinous neoplasm of appendix    • Peripheral vertigo    • Prostate carcinoma        Past Surgical History:   Procedure Laterality Date   • APPENDECTOMY  1993   • COLON SURGERY  06/2014    Right hemicolectomy-Pericecal mass   • COLONOSCOPY     • PROSTATE SURGERY  2014        Encounter Information        Nutrition History:  Visited patient at bedside who is alert and oriented x 4. Patient was admitted after syncopal episode at [facility]. He reports active lifestyle and had just walked 3 miles the day before his incident. Patient reports unintentional weight loss x 90 days. He has lost 11# (6%) x 1 month. He reports eating 2 meals per day. Patient declined ONS. He plans to go home within the next 24 hours. RD to sign off at this time. Please consult if further needs arise    Food Preferences:    Supplements:    Factors Affecting Intake: No factors at this time     Anthropometrics        Current Height  Current Weight  BMI kg/m2 Height: 177 cm (69.69\")  Weight: 83.4 kg (183 lb 13.8 oz) (04/28/23 0500)  Body mass index is 26.62 kg/m².   Adjusted BMI " (if applicable)    BMI Category Overweight (25 - 29.9)       Admission Weight 183# (83.4 kg)        Ideal Body Weight (IBW) 160#    Adjusted IBW (if applicable)        Usual Body Weight (UBW) 190s    Weight Change/Trend Loss, Amount/Timeframe:   11# (6%) x 1 month       Weight History Wt Readings from Last 30 Encounters:   04/28/23 0500 83.4 kg (183 lb 13.8 oz)   04/27/23 2057 88.5 kg (195 lb)   03/16/23 1235 88 kg (194 lb)   12/13/22 1435 87.9 kg (193 lb 11.2 oz)   09/08/22 1240 89.2 kg (196 lb 9.6 oz)   06/08/22 1316 88.8 kg (195 lb 12.8 oz)   03/28/22 1112 91.2 kg (201 lb)   12/07/21 1007 88.9 kg (195 lb 14.4 oz)   08/23/21 0734 91.2 kg (201 lb)   08/10/21 1428 91.4 kg (201 lb 6.4 oz)   12/22/20 1055 92.3 kg (203 lb 8 oz)   12/20/19 1032 91.5 kg (201 lb 11.2 oz)   08/21/18 1050 94.3 kg (208 lb)   02/06/18 1101 93.6 kg (206 lb 6.4 oz)   08/08/17 0916 95.1 kg (209 lb 9.6 oz)   02/21/17 1536 92.8 kg (204 lb 9.6 oz)   08/24/16 1237 94.1 kg (207 lb 6.4 oz)           --  Estimated/Assessed Needs       Energy Requirements    Weight for Calculation 183# (83.4 kg)    Method for Estimation  25 kcal/kg, 30 kcal/kg   EST Needs (kcal/day) 2075-2490       Protein Requirements    Weight for Calculation 183# (83.4 kg)    EST Protein Needs (g/kg) 1.0 - 1.2 gm/kg   EST Daily Needs (g/day)        Fluid Requirements     Method for Estimation 1 mL/kcal    Estimated Needs (mL/day) 2075-2490     Tests/Procedures        Tests/Procedures MRI, X-Ray     Labs       Pertinent Labs    Results from last 7 days   Lab Units 04/27/23 2101 04/27/23  1003   SODIUM mmol/L 139  --    POTASSIUM mmol/L 4.3  --    CHLORIDE mmol/L 105  --    CO2 mmol/L 26.1  --    BUN mg/dL 17  --    CREATININE mg/dL 1.57* 1.70*   CALCIUM mg/dL 9.5  --    BILIRUBIN mg/dL <0.2  --    ALK PHOS U/L 84  --    ALT (SGPT) U/L 12  --    AST (SGOT) U/L 15  --    GLUCOSE mg/dL 168*  --      Results from last 7 days   Lab Units 04/27/23  2101   MAGNESIUM mg/dL 2.3    HEMOGLOBIN g/dL 12.4*   HEMATOCRIT % 38.6   WBC 10*3/mm3 8.67   ALBUMIN g/dL 3.7     Results from last 7 days   Lab Units 04/27/23  2101   INR  1.10   APTT seconds 24.0   PLATELETS 10*3/mm3 301     SARS-CoV-2, SCOOBY   Date Value Ref Range Status   06/19/2022 Detected (A) Not Detected Final     Comment:     Patients who have a positive COVID-19 test result may now have  treatment options. Treatment options are available for patients  with mild to moderate symptoms and for hospitalized patients.  Visit our website at [email or web address] for  resources and information.  This nucleic acid amplification test was developed and its performance  characteristics determined by [facility]. Nucleic acid  amplification tests include RT-PCR and TMA. This test has not been  FDA cleared or approved. This test has been authorized by FDA under  an Emergency Use Authorization (EUA). This test is only authorized  for the duration of time the declaration that circumstances exist  justifying the authorization of the emergency use of in vitro  diagnostic tests for detection of SARS-CoV-2 virus and/or diagnosis  of COVID-19 infection under section 564(b)(1) of the Act, 21 U.S.C.  360bbb-3(b) (1), unless the authorization is terminated or revoked  sooner.  When diagnostic testing is negative, the possibility of a false  negative result should be considered in the context of a patient's  recent exposures and the presence of clinical signs and symptoms  consistent with COVID-19. An individual without symptoms of COVID-19  and who is not shedding SARS-CoV-2 virus would expect to have a  negative (not detected) result in this assay.     No results found for: HGBA1C       Medications           Scheduled Medications donepezil, 5 mg, Oral, Daily  midodrine, 5 mg, Oral, TID AC  pantoprazole, 40 mg, Oral, Q AM  predniSONE, 1 mg, Oral, Daily With Breakfast       Infusions sodium chloride, 125 mL/hr, Last Rate: 125 mL/hr  (04/28/23 8984)       PRN Medications •  meclizine  •  [COMPLETED] Insert Peripheral IV **AND** sodium chloride     Physical Findings          Physical Appearance alert, oriented, overweight   Oral/Mouth Cavity WNL   Edema  no edema   Gastrointestinal last bowel movement: UTD    Skin  skin intact   Tubes/Drains/Lines none   NFPE Not applicable at this time   --  Current Nutrition Orders & Evaluation of Intake       Oral Nutrition     Food Allergies NKFA   Current PO Diet Diet: Regular/House Diet; Texture: Regular Texture (IDDSI 7); Fluid Consistency: Thin (IDDSI 0)   Supplement n/a   PO Evaluation     % PO Intake Insufficient data     # of Days Evaluated    --  PES STATEMENT / NUTRITION DIAGNOSIS      Nutrition Dx Problem  Problem: Unintentional Weight Loss  Etiology: Medical Diagnosissyncope   Signs/Symptoms: Unintended Weight Change    Comment:    --  NUTRITION INTERVENTION / PLAN OF CARE      Intervention Goal(s) Maintain nutrition status, Maintain intake and Maintain weight         RD Intervention/Action Interview for preferences, Supplement offered/refused, Follow Tx Progress and Care plan reviewed         Prescription/Orders:       PO Diet       Supplements       Snacks       Enteral Nutrition       Parenteral Nutrition    New Prescription Ordered? No changes at this time   --      Monitor/Evaluation Per protocol, I&O, PO intake, Pertinent labs, Weight, Skin status, GI status, Symptoms, POC/GOC   Discharge Plan/Needs No discharge needs identified at this time   Education Will instruct as appropriate   --    RD to follow per protocol.      Electronically signed by:  Carin Martin RD  04/28/23 14:28 EDT

## 2023-04-28 NOTE — THERAPY EVALUATION
Patient Name: Danni Figueroa  : 1945    MRN: [id number]                              Today's Date: 2023       Admit Date: 2023    Visit Dx:     ICD-10-CM ICD-9-CM   1. Syncope, unspecified syncope type  R55 780.2     Patient Active Problem List   Diagnosis   • Low grade mucinous neoplasm of appendix   • Prostate cancer   • Polymyalgia rheumatica   • Bradycardia, sinus   • Syncope and collapse   • Syncope, unspecified syncope type     Past Medical History:   Diagnosis Date   • Diabetes mellitus    • History of alcohol abuse    • Hypertension    • Low grade mucinous neoplasm of appendix    • Peripheral vertigo    • Prostate carcinoma      Past Surgical History:   Procedure Laterality Date   • APPENDECTOMY     • COLON SURGERY  2014    Right hemicolectomy-Pericecal mass   • COLONOSCOPY     • PROSTATE SURGERY        General Information     Row Name 23 1525          Physical Therapy Time and Intention    Document Type evaluation (P)   -ZB     Mode of Treatment physical therapy (P)   -ZB     Row Name 23 1525          General Information    Patient Profile Reviewed yes (P)   -ZB     Prior Level of Function independent:;ADL's;dressing;bathing;all household mobility;community mobility (P)   -ZB     Existing Precautions/Restrictions fall;other (see comments) (P)   orthostatic hypotension  -ZB     Row Name 23 1525          Living Environment    People in Home spouse (P)   -ZB     Row Name 23 1525          Cognition    Orientation Status (Cognition) oriented x 3 (P)   -ZB     Row Name 23 1525          Safety Issues, Functional Mobility    Safety Issues Affecting Function (Mobility) insight into deficits/self-awareness;awareness of need for assistance (P)   -ZB     Impairments Affecting Function (Mobility) balance (P)   -ZB           User Key  (r) = Recorded By, (t) = Taken By, (c) = Cosigned By    Initials Name Provider Type    ZB Rafael Estes, PT Student PT Student                Mobility     Row Name 04/28/23 1531          Bed Mobility    Bed Mobility supine-sit;sit-supine (P)   -ZB     Supine-Sit [place] (Bed Mobility) standby assist (P)   -ZB     Sit-Supine [place] (Bed Mobility) standby assist (P)   -ZB     Row Name 04/28/23 1531          Bed-Chair Transfer    Bed-Chair [place] (Transfers) not tested (P)   -ZB     Row Name 04/28/23 1531          Sit-Stand Transfer    Sit-Stand [place] (Transfers) contact guard (P)   -ZB     Comment, (Sit-Stand Transfer) no AD (P)   -ZB     Row Name 04/28/23 1531          Gait/Stairs (Locomotion)    [place] Level (Gait) contact guard (P)   -ZB     Assistive Device (Gait) other (see comments) (P)   -ZB     Distance in Feet (Gait) 70' (P)   -ZB     Deviations/Abnormal Patterns (Gait) stride length decreased (P)   -ZB           User Key  (r) = Recorded By, (t) = Taken By, (c) = Cosigned By    Initials Name Provider Type    Rafael Santana, PT Student PT Student               Obj/Interventions     Row Name 04/28/23 1533          Strength Comprehensive (MMT)    Comment, General Manual Muscle Testing (MMT) Assessment B LE MMT grossly WFL (P)   -ZB           User Key  (r) = Recorded By, (t) = Taken By, (c) = Cosigned By    Initials Name Provider Type    Rafael Santana, PT Student PT Student               Goals/Plan    No documentation.                Clinical Impression     Row Name 04/28/23 2932          Pain    Pretreatment Pain Rating 0/10 - no pain (P)   -ZB     Posttreatment Pain Rating 0/10 - no pain (P)   -ZB     Pain Intervention(s) Repositioned;Ambulation/increased activity (P)   -ZB     Row Name 04/28/23 1924          Plan of Care Review    Plan of Care Reviewed With patient;spouse (P)   -ZB     Outcome Evaluation Pt is a 76 y/o male admitted to [facility] following a syncopal episode at home and N/V. Pt has Hx of OH and prostate ca. Pt A+Ox3 this PM for evaluation w/ spouse present in room. Pt IND w/ ADLs and  "mobility at baseline w/ no AD. Pt reports walking 3-4 miles every day, and states that he felt fine up until the last second when the snycope \"came out of nowhere.\" Today pt came to EOB sba, STS sba, and amb 75' sba + no AD. Orthostatics assessed: sitting 130/82, standing 1 min 114/77, standing 3 min 104/86. Pt had no c/o dizziness, light head, etc. Acute PT services not indicated at this time. No concerns for return to home once cleared from a medical standpoint. (P)   -ZB     Row Name 04/28/23 1557          Therapy Assessment/Plan (PT)    Criteria for Skilled Interventions Met (PT) no (P)   -ZB     Row Name 04/28/23 1553          Vital Signs    O2 Delivery Pre Treatment room air (P)   -ZB     Pre Patient Position Supine (P)   -ZB     Intra Patient Position Standing (P)   -ZB     Post Patient Position Supine (P)   -ZB     Row Name 04/28/23 1557          Positioning and Restraints    Pre-Treatment Position in bed (P)   -ZB     Post Treatment Position bed (P)   -ZB     In Bed notified nsg;fowlers;encouraged to call for assist;exit alarm on;call light within reach;with family/caregiver (P)   -ZB           User Key  (r) = Recorded By, (t) = Taken By, (c) = Cosigned By    Initials Name Provider Type    Rafael Santana, PT Student PT Student               Outcome Measures     Row Name 04/28/23 1602 04/28/23 1325       How much help from another person do you currently need...    Turning from your back to your side while in flat bed without using bedrails? 4 (P)   -ZB 4  -KP    Moving from lying on back to sitting on the side of a flat bed without bedrails? 4 (P)   -ZB 4  -KP    Moving to and from a bed to a chair (including a wheelchair)? 4 (P)   -ZB 4  -KP    Standing up from a chair using your arms (e.g., wheelchair, bedside chair)? 4 (P)   -ZB 4  -KP    Climbing 3-5 steps with a railing? 4 (P)   -ZB 4  -KP    To walk in hospital room? 4 (P)   -ZB 4  -KP    AM-PAC 6 Clicks Score (PT) 24 (P)   -ZB 24  -KP    Highest " level of mobility 8 --> Walked 250 feet or more (P)   -ZB 8 --> Walked 250 feet or more  -    Row Name 04/28/23 1537          Modified [place] Scale    Modified [place] Scale 1 - No significant disability despite symptoms.  Able to carry out all usual duties and activities.  -     Row Name 04/28/23 1602 04/28/23 1537       Functional Assessment    Outcome Measure Options AM-PAC 6 Clicks Basic Mobility (PT) (P)   -ZB AM-PAC 6 Clicks Daily Activity (OT);Modified [place]  -          User Key  (r) = Recorded By, (t) = Taken By, (c) = Cosigned By    Initials Name Provider Type    Aniyah Johnson, RN Registered Nurse    Dalia Cameron, OT Occupational Therapist    Rafael Santana, PT Student PT Student                             Physical Therapy Education     Title: PT OT SLP Therapies (Done)     Topic: Physical Therapy (Done)     Point: Mobility training (Done)     Learning Progress Summary           Patient Acceptance, E, VU,DU by MELLY at 4/28/2023 1603   Family Acceptance, E, VU,DU by MELLY at 4/28/2023 1603                   Point: Home exercise program (Done)     Learning Progress Summary           Patient Acceptance, E, VU,DU by MELLY at 4/28/2023 1603   Family Acceptance, E, VU,DU by MELLY at 4/28/2023 1603                   Point: Body mechanics (Done)     Learning Progress Summary           Patient Acceptance, E, VU,DU by MELLY at 4/28/2023 1603   Family Acceptance, E, VU,DU by MELLY at 4/28/2023 1603                   Point: Precautions (Done)     Learning Progress Summary           Patient Acceptance, E, VU,DU by MELLY at 4/28/2023 1603   Family Acceptance, E, VU,DU by MELLY at 4/28/2023 1603                               User Key     Initials Effective Dates Name Provider Type Discipline    MELLY 03/10/23 -  Rafael Estes, PT Student PT Student PT              PT Recommendation and Plan     Plan of Care Reviewed With: (P) patient, spouse  Outcome Evaluation: (P) Pt is a 78 y/o male admitted to [facility] following a syncopal  "episode at home and N/V. Pt has Hx of OH and prostate ca. Pt A+Ox3 this PM for evaluation w/ spouse present in room. Pt IND w/ ADLs and mobility at baseline w/ no AD. Pt reports walking 3-4 miles every day, and states that he felt fine up until the last second when the snycope \"came out of nowhere.\" Today pt came to EOB sba, STS sba, and amb 75' sba + no AD. Orthostatics assessed: sitting 130/82, standing 1 min 114/77, standing 3 min 104/86. Pt had no c/o dizziness, light head, etc. Acute PT services not indicated at this time. No concerns for return to home once cleared from a medical standpoint.     Time Calculation:    PT Charges     Row Name 04/28/23 1603             Time Calculation    Start Time 1418 (P)   -ZB      Stop Time 1434 (P)   -ZB      Time Calculation (min) 16 min (P)   -ZB      PT Received On 04/28/23 (P)   -ZB         Time Calculation- PT    Total Timed Code Minutes- PT 9 minute(s) (P)   -ZB         Timed Charges    81680 - PT Therapeutic Activity Minutes 9 (P)   -ZB         Total Minutes    Timed Charges Total Minutes 9 (P)   -ZB       Total Minutes 9 (P)   -ZB            User Key  (r) = Recorded By, (t) = Taken By, (c) = Cosigned By    Initials Name Provider Type    ZB Rafael Estes, PT Student PT Student              Therapy Charges for Today     Code Description Service Date Service Provider Modifiers Qty    [id number]  PT THERAPEUTIC ACT EA 15 MIN 4/28/2023 Rafael Estes, PT Student GP 1    [id number]  PT EVAL LOW COMPLEXITY 3 4/28/2023 Rafael Estes, PT Student GP 1          PT G-Codes  Outcome Measure Options: (P) AM-PAC 6 Clicks Basic Mobility (PT)  AM-PAC 6 Clicks Score (PT): (P) 24  AM-PAC 6 Clicks Score (OT): 24  Modified [place] Scale: 1 - No significant disability despite symptoms.  Able to carry out all usual duties and activities.  PT Discharge Summary  Anticipated Discharge Disposition (PT): (P) home with assist    BOB Clarke  4/28/2023    "

## 2023-04-28 NOTE — PLAN OF CARE
Goal Outcome Evaluation:  Plan of Care Reviewed With: patient, spouse        Progress: no change  Outcome Evaluation: See nursing note regarding orthostatics.        Problem: Adult Inpatient Plan of Care  Goal: Plan of Care Review  Outcome: Ongoing, Progressing  Flowsheets (Taken 4/28/2023 1323)  Progress: no change  Plan of Care Reviewed With:   patient   spouse  Outcome Evaluation: See nursing note regarding orthostatics.  Goal: Patient-Specific Goal (Individualized)  Outcome: Ongoing, Progressing  Goal: Absence of Hospital-Acquired Illness or Injury  Outcome: Ongoing, Progressing  Goal: Optimal Comfort and Wellbeing  Outcome: Ongoing, Progressing  Goal: Readiness for Transition of Care  Outcome: Ongoing, Progressing     Problem: Fall Injury Risk  Goal: Absence of Fall and Fall-Related Injury  Outcome: Ongoing, Progressing     Problem: Hypertension Comorbidity  Goal: Blood Pressure in Desired Range  Outcome: Ongoing, Progressing     Problem: Syncope  Goal: Absence of Syncopal Symptoms  Outcome: Ongoing, Progressing

## 2023-04-28 NOTE — CASE MANAGEMENT/SOCIAL WORK
Discharge Planning Assessment  [facility]     Patient Name: Danni Figueroa  MRN: [id number]  Today's Date: 4/28/2023    Admit Date: 4/27/2023    Plan: Home with spouse to transport   Discharge Needs Assessment     Row Name 04/28/23 1710       Living Environment    People in Home spouse    Name(s) of People in Home Paula Motta [phone]    Current Living Arrangements home    Potentially Unsafe Housing Conditions none    Primary Care Provided by self    Provides Primary Care For no one    Family Caregiver if Needed spouse    Family Caregiver Names Spouse, Paula [phone]    Quality of Family Relationships helpful;involved;supportive    Able to Return to Prior Arrangements yes       Resource/Environmental Concerns    Resource/Environmental Concerns none       Transition Planning    Patient/Family Anticipates Transition to home with family    Patient/Family Anticipated Services at Transition none    Transportation Anticipated family or friend will provide       Discharge Needs Assessment    Equipment Currently Used at Home none    Concerns to be Addressed discharge planning               Discharge Plan     Row Name 04/28/23 2856       Plan    Plan Home with spouse to transport    Patient/Family in Agreement with Plan yes    Plan Comments CCP spoke with patient and patient's spouse, Paula, at bedside; CCP role explained, face sheet verified, and discharge plan discussed. Patient resides with spouse in three-level home with four steps to enter through garage and two steps to enter through front. Patient reports independent with ADLs and denies any DME use. Denies use of HH and SNF in the past. Patient ambulated 75ft with no AD; PT signed off. Patient plans to return home with spouse. Denies any known discharge needs at this time. Spouse will transport home. CCP to follow should any discharge needs arise. Jennifer TEJADA LCSW              Continued Care and Services - Admitted Since 4/27/2023    Coordination  has not been started for this encounter.       Expected Discharge Date and Time     Expected Discharge Date Expected Discharge Time    Apr 29, 2023          Demographic Summary     Row Name 04/28/23 1709       General Information    Admission Type observation    Arrived From home    Reason for Consult discharge planning    Preferred Language English               Functional Status     Row Name 04/28/23 1709       Functional Status    Usual Activity Tolerance good    Current Activity Tolerance good       Functional Status, IADL    Medications independent    Meal Preparation independent    Housekeeping independent    Laundry independent    Shopping independent       Mental Status    General Appearance WDL WDL               Psychosocial    No documentation.                Abuse/Neglect    No documentation.                Legal    No documentation.                Substance Abuse    No documentation.                Patient Forms    No documentation.                   Jennifer Feliciano

## 2023-04-28 NOTE — ED NOTES
"Nursing report ED to floor  Danni Figueroa  77 y.o.  male    HPI :   Chief Complaint   Patient presents with    Syncope       Admitting doctor:   Derrick Brothers MD    Admitting diagnosis:   The encounter diagnosis was Syncope, unspecified syncope type.    Code status:   Current Code Status       Date Active Code Status Order ID Comments User Context       Not on file            Allergies:   Patient has no known allergies.    Isolation:   No active isolations    Intake and Output  No intake or output data in the 24 hours ending 04/28/23 0037    Weight:       04/27/23 2057   Weight: 88.5 kg (195 lb)       Most recent vitals:   Vitals:    04/27/23 2057 04/27/23 2131 04/27/23 2231 04/27/23 2232   BP:  132/81 133/83    Pulse:  62  55   Resp:       Temp:       SpO2:  99%  99%   Weight: 88.5 kg (195 lb)      Height: 177 cm (69.69\")          Active LDAs/IV Access:   Lines, Drains & Airways       Active LDAs       Name Placement date Placement time Site Days    Peripheral IV 04/27/23 2037 Left Antecubital 04/27/23 2037  Antecubital  less than 1                    Labs (abnormal labs have a star):   Labs Reviewed   COMPREHENSIVE METABOLIC PANEL - Abnormal; Notable for the following components:       Result Value    Glucose 168 (*)     Creatinine 1.57 (*)     eGFR 45.7 (*)     All other components within normal limits    Narrative:     GFR Normal >60  Chronic Kidney Disease <60  Kidney Failure <15    The GFR formula is only valid for adults with stable renal function between ages 18 and 70.   PROTIME-INR - Abnormal; Notable for the following components:    Protime 14.3 (*)     All other components within normal limits   TROPONIN - Abnormal; Notable for the following components:    HS Troponin T 26 (*)     All other components within normal limits    Narrative:     High Sensitive Troponin T Reference Range:  <10.0 ng/L- Negative Female for AMI  <15.0 ng/L- Negative Male for AMI  >=10 - Abnormal Female indicating possible " myocardial injury.  >=15 - Abnormal Male indicating possible myocardial injury.   Clinicians would have to utilize clinical acumen, EKG, Troponin, and serial changes to determine if it is an Acute Myocardial Infarction or myocardial injury due to an underlying chronic condition.        CBC WITH AUTO DIFFERENTIAL - Abnormal; Notable for the following components:    Hemoglobin 12.4 (*)     MCH 26.0 (*)     Lymphocyte % 17.4 (*)     Immature Grans % 0.6 (*)     All other components within normal limits   HIGH SENSITIVITIY TROPONIN T 2HR - Abnormal; Notable for the following components:    HS Troponin T 33 (*)     Troponin T Delta 7 (*)     All other components within normal limits    Narrative:     High Sensitive Troponin T Reference Range:  <10.0 ng/L- Negative Female for AMI  <15.0 ng/L- Negative Male for AMI  >=10 - Abnormal Female indicating possible myocardial injury.  >=15 - Abnormal Male indicating possible myocardial injury.   Clinicians would have to utilize clinical acumen, EKG, Troponin, and serial changes to determine if it is an Acute Myocardial Infarction or myocardial injury due to an underlying chronic condition.        APTT - Normal   BNP (IN-HOUSE) - Normal    Narrative:     Among patients with dyspnea, NT-proBNP is highly sensitive for the detection of acute congestive heart failure. In addition NT-proBNP of <300 pg/ml effectively rules out acute congestive heart failure with 99% negative predictive value.    Results may be falsely decreased if patient taking Biotin.     MAGNESIUM - Normal   CBC AND DIFFERENTIAL    Narrative:     The following orders were created for panel order CBC & Differential.  Procedure                               Abnormality         Status                     ---------                               -----------         ------                     CBC Auto Differential[[id number]]        Abnormal            Final result                 Please view results for these tests on the  individual orders.       EKG:   ECG 12 Lead Syncope   Preliminary Result   HEART RATE= 56  bpm   RR Interval= 1071  ms   NC Interval= 184  ms   P Horizontal Axis= 16  deg   P Front Axis= -19  deg   QRSD Interval= 109  ms   QT Interval= 318  ms   QRS Axis= -18  deg   T Wave Axis= -25  deg   - ABNORMAL ECG -   Sinus rhythm   Atrial premature complexes   Borderline left axis deviation   Borderline low voltage, extremity leads   Baseline wander in lead(s) V4   Electronically Signed By:    Date and Time of Study: 2023-04-27 20:47:28          Meds given in ED:   Medications   sodium chloride 0.9 % flush 10 mL (has no administration in time range)       Imaging results:  No radiology results for the last day    Ambulatory status:   - up with assist     Social issues:   Social History     Socioeconomic History    Marital status:      Spouse name: Paula    Years of education: College   Tobacco Use    Smoking status: Former     Packs/day: 1.00     Years: 10.00     Pack years: 10.00     Types: Cigarettes    Smokeless tobacco: Never    Tobacco comments:     caffeine use   Substance and Sexual Activity    Alcohol use: No     Comment: 31 year former alcoholic     Drug use: No    Sexual activity: Defer       NIH Stroke Scale:         Rebeca Gonzalez RN  04/28/23 00:37 EDT

## 2023-04-28 NOTE — H&P
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  Patient stated that he has been feeling dizzy lightheaded and fell on the ground but did not lose any consciousness.  Patient denies any chest pain shortness of breath palpitation abdominal pain nausea vomiting diarrhea.  Patient evaluated in ER found to be hypotensive admitted for management.  At the time of interview he is awake and alert and answers all question appropriately and give me a detailed history and daughter also contributed.  Patient has no complaint at the time of examination.    PAST MEDICAL HISTORY  • Diabetes mellitus     • History of alcohol abuse     • Hypertension     • Peripheral vertigo     • Prostate carcinoma        PAST SURGICAL HISTORY              Procedure Laterality Date   • APPENDECTOMY   1993   • COLON SURGERY   06/2014     Right hemicolectomy-Pericecal mass   • COLONOSCOPY       • PROSTATE SURGERY   2014         FAMILY HISTORY           Problem Relation Age of Onset   • Prostate cancer Brother     • Heart failure Brother     • Diabetes Other     • Heart failure Brother     • Heart failure Son        SOCIAL HISTORY                 Socioeconomic History   • Marital status:        Spouse name: Paula   • Years of education: College   Tobacco Use   • Smoking status: Former       Packs/day: 1.00       Years: 10.00       Pack years: 10.00       Types: Cigarettes   • Smokeless tobacco: Never   • Tobacco comments:       caffeine use   Vaping Use   • Vaping Use: Never used   Substance and Sexual Activity   • Alcohol use: No       Comment: 29 year former alcoholic    • Drug use: No   • Sexual activity:  "Defer         ALLERGIES  Patient has no known allergies.  Home medications reviewed      REVIEW OF SYSTEMS  All systems reviewed and negative except for those discussed in HPI.      PHYSICAL EXAM   Blood pressure (!) 77/58, pulse 83, temperature 98.4 °F (36.9 °C), temperature source Oral, resp. rate 16, height 177 cm (69.69\"), weight 83.4 kg (183 lb 13.8 oz), SpO2 97 %.    GENERAL: alert, no acute distress  SKIN: Warm, dry  HEENT:  Unremarkable  NECK:  Supple  CV: regular rhythm, regular rate  RESPIRATORY: normal effort, moving air bilaterally  ABDOMEN: soft, nontender, nondistended bowel sounds positive  MUSCULOSKELETAL: no deformity  NEURO: alert, moves all extremities, follows commands     LAB RESULTS  Lab Results (last 24 hours)     Procedure Component Value Units Date/Time    High Sensitivity Troponin T 2Hr [[id number]]  (Abnormal) Collected: 04/27/23 2256    Specimen: Blood Updated: 04/27/23 2338     HS Troponin T 33 ng/L      Troponin T Delta 7 ng/L     Narrative:      High Sensitive Troponin T Reference Range:  <10.0 ng/L- Negative Female for AMI  <15.0 ng/L- Negative Male for AMI  >=10 - Abnormal Female indicating possible myocardial injury.  >=15 - Abnormal Male indicating possible myocardial injury.   Clinicians would have to utilize clinical acumen, EKG, Troponin, and serial changes to determine if it is an Acute Myocardial Infarction or myocardial injury due to an underlying chronic condition.         BNP [[id number]]  (Normal) Collected: 04/27/23 2101    Specimen: Blood Updated: 04/27/23 2225     proBNP 171.0 pg/mL     Narrative:      Among patients with dyspnea, NT-proBNP is highly sensitive for the detection of acute congestive heart failure. In addition NT-proBNP of <300 pg/ml effectively rules out acute congestive heart failure with 99% negative predictive value.    Results may be falsely decreased if patient taking Biotin.      High Sensitivity Troponin T [[id number]]  (Abnormal) Collected: 04/27/23 " 2101    Specimen: Blood Updated: 04/27/23 2225     HS Troponin T 26 ng/L     Narrative:      High Sensitive Troponin T Reference Range:  <10.0 ng/L- Negative Female for AMI  <15.0 ng/L- Negative Male for AMI  >=10 - Abnormal Female indicating possible myocardial injury.  >=15 - Abnormal Male indicating possible myocardial injury.   Clinicians would have to utilize clinical acumen, EKG, Troponin, and serial changes to determine if it is an Acute Myocardial Infarction or myocardial injury due to an underlying chronic condition.         Comprehensive Metabolic Panel [[id number]]  (Abnormal) Collected: 04/27/23 2101    Specimen: Blood Updated: 04/27/23 2146     Glucose 168 mg/dL      BUN 17 mg/dL      Creatinine 1.57 mg/dL      Sodium 139 mmol/L      Potassium 4.3 mmol/L      Comment: Slight hemolysis detected by analyzer. Results may be affected.        Chloride 105 mmol/L      CO2 26.1 mmol/L      Calcium 9.5 mg/dL      Total Protein 6.7 g/dL      Albumin 3.7 g/dL      ALT (SGPT) 12 U/L      AST (SGOT) 15 U/L      Comment: Slight hemolysis detected by analyzer. Results may be affected.        Alkaline Phosphatase 84 U/L      Total Bilirubin <0.2 mg/dL      Globulin 3.0 gm/dL      A/G Ratio 1.2 g/dL      BUN/Creatinine Ratio 10.8     Anion Gap 7.9 mmol/L      eGFR 45.7 mL/min/1.73     Narrative:      GFR Normal >60  Chronic Kidney Disease <60  Kidney Failure <15    The GFR formula is only valid for adults with stable renal function between ages 18 and 70.    Magnesium [[id number]]  (Normal) Collected: 04/27/23 2101    Specimen: Blood Updated: 04/27/23 2146     Magnesium 2.3 mg/dL     aPTT [[id number]]  (Normal) Collected: 04/27/23 2101    Specimen: Blood Updated: 04/27/23 2130     PTT 24.0 seconds     Protime-INR [[id number]]  (Abnormal) Collected: 04/27/23 2101    Specimen: Blood Updated: 04/27/23 2130     Protime 14.3 Seconds      INR 1.10    CBC & Differential [[id number]]  (Abnormal) Collected: 04/27/23 2101     Specimen: Blood Updated: 04/27/23 2118    Narrative:      The following orders were created for panel order CBC & Differential.  Procedure                               Abnormality         Status                     ---------                               -----------         ------                     CBC Auto Differential[[id number]]        Abnormal            Final result                 Please view results for these tests on the individual orders.    CBC Auto Differential [[id number]]  (Abnormal) Collected: 04/27/23 2101    Specimen: Blood Updated: 04/27/23 2118     WBC 8.67 10*3/mm3      RBC 4.77 10*6/mm3      Hemoglobin 12.4 g/dL      Hematocrit 38.6 %      MCV 80.9 fL      MCH 26.0 pg      MCHC 32.1 g/dL      RDW 13.7 %      RDW-SD 39.2 fl      MPV 9.7 fL      Platelets 301 10*3/mm3      Neutrophil % 74.1 %      Lymphocyte % 17.4 %      Monocyte % 6.1 %      Eosinophil % 1.3 %      Basophil % 0.5 %      Immature Grans % 0.6 %      Neutrophils, Absolute 6.43 10*3/mm3      Lymphocytes, Absolute 1.51 10*3/mm3      Monocytes, Absolute 0.53 10*3/mm3      Eosinophils, Absolute 0.11 10*3/mm3      Basophils, Absolute 0.04 10*3/mm3      Immature Grans, Absolute 0.05 10*3/mm3      nRBC 0.1 /100 WBC         Imaging Results (Last 24 Hours)     Procedure Component Value Units Date/Time    XR Chest 1 View [[id number]] Collected: 04/27/23 2128     Updated: 04/27/23 2132    Narrative:      XR CHEST 1 VW-     Clinical: Shortness of breath     COMPARISON CT chest 12/22/2014     FINDINGS: Similar to the previous CT examination there is elevation of  the right hemidiaphragm. Cardiac size upper limits of normal. No pleural  effusion, vascular congestion nor acute airspace disease is  demonstrated.     CONCLUSION: No acute pulmonary process is demonstrated, elevated right  hemidiaphragm similar to 2014.     This report was finalized on 4/27/2023 9:29 PM by Dr. Ismael Patino M.D.           ECG 12 Lead       Component  Ref Range &  Units 1 d ago   QT Interval  ms 318    Resulting Agency  ECG             HEART RATE= 56  bpm  RR Interval= 1071  ms  MO Interval= 184  ms  P Horizontal Axis= 16  deg  P Front Axis= -19  deg  QRSD Interval= 109  ms  QT Interval= 318  ms  QRS Axis= -18  deg  T Wave Axis= -25  deg  - ABNORMAL ECG -  Sinus rhythm  Atrial premature complexes  Borderline left axis deviation  Borderline low voltage, extremity leads  PACs are new               Current Facility-Administered Medications:   •  donepezil (ARICEPT) tablet 5 mg, 5 mg, Oral, Daily, Ken Brothers MD  •  fludrocortisone tablet 100 mcg, 100 mcg, Oral, Daily, Ken Brothers MD, 100 mcg at 04/28/23 0917  •  meclizine (ANTIVERT) tablet 25 mg, 25 mg, Oral, TID PRN, Ken Brothers MD  •  pantoprazole (PROTONIX) EC tablet 40 mg, 40 mg, Oral, Q AM, Ken Brothers MD, 40 mg at 04/28/23 0621  •  predniSONE (DELTASONE) tablet 1 mg, 1 mg, Oral, Daily With Breakfast, Ken Brothers MD, 1 mg at 04/28/23 0917  •  [COMPLETED] Insert Peripheral IV, , , Once **AND** sodium chloride 0.9 % flush 10 mL, 10 mL, Intravenous, PRN, Fei Clark MD  •  sodium chloride 0.9 % infusion, 125 mL/hr, Intravenous, Continuous, Ken Brothers MD, Last Rate: 125 mL/hr at 04/28/23 1030, 125 mL/hr at 04/28/23 1030     ASSESSMENT  Syncopal episode secondary to hypotension  Sinus bradycardia,  Severe orthostatic hypotension  Polymyalgia rheumatica  Prostate cancer  Appendiceal mucinous neoplasm s/p right hemicolectomy  Dementia  History of alcohol abuse    PLAN  Admit  IVF  Monitor  Midodrine if needed  Cardiology consult  Continue medications  Stress ulcer DVT prophylaxis  Supportive care  PT OT  Patient is full code  Discussed with nursing staff and family  Follow closely further recommendation current hospital course    KEN BROTHERS MD

## 2023-04-28 NOTE — THERAPY EVALUATION
Patient Name: Danni Figueroa  : 1945    MRN: [id number]                              Today's Date: 2023       Admit Date: 2023    Visit Dx:     ICD-10-CM ICD-9-CM   1. Syncope, unspecified syncope type  R55 780.2     Patient Active Problem List   Diagnosis   • Low grade mucinous neoplasm of appendix   • Prostate cancer   • Polymyalgia rheumatica   • Bradycardia, sinus   • Syncope and collapse   • Syncope, unspecified syncope type     Past Medical History:   Diagnosis Date   • Diabetes mellitus    • History of alcohol abuse    • Hypertension    • Low grade mucinous neoplasm of appendix    • Peripheral vertigo    • Prostate carcinoma      Past Surgical History:   Procedure Laterality Date   • APPENDECTOMY     • COLON SURGERY  2014    Right hemicolectomy-Pericecal mass   • COLONOSCOPY     • PROSTATE SURGERY        General Information     Row Name 23 1532          OT Time and Intention    Document Type evaluation  -MW     Mode of Treatment occupational therapy  -MW     Row Name 23 1532          General Information    Patient Profile Reviewed yes  -MW     Prior Level of Function independent:  -MW     Existing Precautions/Restrictions no known precautions/restrictions  -MW     Barriers to Rehab none identified  -MW     Row Name 23 1532          Living Environment    People in Home spouse  -MW     Row Name 23 1532          Cognition    Orientation Status (Cognition) oriented x 3  -MW           User Key  (r) = Recorded By, (t) = Taken By, (c) = Cosigned By    Initials Name Provider Type    Dalia Cameron OT Occupational Therapist                 Mobility/ADL's     Row Name 23 1533          Bed Mobility    Bed Mobility supine-sit;sit-supine  -MW     Supine-Sit [place] (Bed Mobility) standby assist  -MW     Sit-Supine [place] (Bed Mobility) standby assist  -MW     Row Name 23 1533          Transfers    Transfers sit-stand transfer  -MW      Row Name 04/28/23 [facility]          Sit-Stand Transfer    Sit-Stand [place] (Transfers) standby assist  -MW     Row Name 04/28/23 [facility]          Functional Mobility    Functional Mobility- Ind. Level standby assist  -     Functional Mobility- Device other (see comments)  no AD  -MW     Row Name 04/28/23 [facility]          Activities of Daily Living    BADL Assessment/Intervention lower body dressing  -MW     Row Name 04/28/23 [facility]          Lower Body Dressing Assessment/Training    Comment, (Lower Body Dressing) pants donned prior to session, donned slip on shoes with s/up  -           User Key  (r) = Recorded By, (t) = Taken By, (c) = Cosigned By    Initials Name Provider Type    Dalia Cameron OT Occupational Therapist               Obj/Interventions     Row Name 04/28/23 [facility]          Sensory Assessment (Somatosensory)    Sensory Assessment (Somatosensory) UE sensation intact  -MW     Row Name 04/28/23 [facility]          Vision Assessment/Intervention    Visual Impairment/Limitations WFL  -MW     Row Name 04/28/23 1533          Range of Motion Comprehensive    General Range of Motion bilateral upper extremity ROM WNL  Simultaneous filing. User may be unaware of other data.  -MW     Row Name 04/28/23 1533          Strength Comprehensive (MMT)    General Manual Muscle Testing (MMT) Assessment no strength deficits identified  Simultaneous filing. User may be unaware of other data.  -MW     Row Name 04/28/23 1533          Balance    Balance Assessment sitting static balance;sitting dynamic balance;sit to stand dynamic balance;standing static balance;standing dynamic balance  -     Static Sitting Balance independent  -     Dynamic Sitting Balance independent  -     Position, Sitting Balance sitting edge of bed  -     Sit to Stand Dynamic Balance standby assist  -     Static Standing Balance standby assist  -     Dynamic Standing Balance standby assist  -     Position/Device Used, Standing Balance  unsupported  -MW           User Key  (r) = Recorded By, (t) = Taken By, (c) = Cosigned By    Initials Name Provider Type    Dalia Cameron OT Occupational Therapist               Goals/Plan     Row Name 04/28/23 1537          Transfer Goal 1 (OT)    Activity/Assistive Device (Transfer Goal 1, OT) sit-to-stand/stand-to-sit  -MW     [place] Level/Cues Needed (Transfer Goal 1, OT) standby assist  -MW     Time Frame (Transfer Goal 1, OT) short term goal (STG);1 day  -MW     Progress/Outcome (Transfer Goal 1, OT) goal met  -MW           User Key  (r) = Recorded By, (t) = Taken By, (c) = Cosigned By    Initials Name Provider Type    Dalia Cameron OT Occupational Therapist               Clinical Impression     Row Name 04/28/23 1534          Pain Assessment    Pretreatment Pain Rating 0/10 - no pain  -MW     Posttreatment Pain Rating 0/10 - no pain  -MW     Row Name 04/28/23 1534          Plan of Care Review    Plan of Care Reviewed With patient;spouse  -MW     Progress no change  -MW     Outcome Evaluation Pt is a 76 yo male admitted from home with c/o syncopal episode and N/V. Hx severe OH, prostate cancer. Pt seen for OT MERCEDES contreras&Ox3, (I) at baseline with ADLs and mobility. Pt moving well this date, SBA for bed mob, STS and mob with SBA and no AD, no concerns for ADL task completion. Pt with orthostatics assessed. Sitting 130/82, standing 1 min 114/77, standing 3 mins 104/86. Pt with no reports of dizziness, educated on no acute OT needs during stay, will sign off. Family and RN aware.  -MW     Row Name 04/28/23 1534          Therapy Assessment/Plan (OT)    Criteria for Skilled Therapeutic Interventions Met (OT) no problems identified which require skilled intervention  -MW     Row Name 04/28/23 1534          Therapy Plan Review/Discharge Plan (OT)    Anticipated Discharge Disposition (OT) home  -MW     Row Name 04/28/23 1534          Vital Signs    O2 Delivery Pre Treatment room air  -MW     Pre  Patient Position Supine  -MW     Intra Patient Position Standing  -MW     Post Patient Position Supine  -MW     Row Name 04/28/23 1534          Positioning and Restraints    Pre-Treatment Position in bed  -MW     Post Treatment Position bed  -MW     In Bed notified nsg;fowlers;call light within reach;encouraged to call for assist;exit alarm on;with family/caregiver  -           User Key  (r) = Recorded By, (t) = Taken By, (c) = Cosigned By    Initials Name Provider Type    Dalia Cameron OT Occupational Therapist               Outcome Measures     Row Name 04/28/23 1537          How much help from another is currently needed...    Putting on and taking off regular lower body clothing? 4  -MW     Bathing (including washing, rinsing, and drying) 4  -MW     Toileting (which includes using toilet bed pan or urinal) 4  -MW     Putting on and taking off regular upper body clothing 4  -MW     Taking care of personal grooming (such as brushing teeth) 4  -MW     Eating meals 4  -MW     AM-PAC 6 Clicks Score (OT) 24  -MW     Row Name 04/28/23 1325          How much help from another person do you currently need...    Turning from your back to your side while in flat bed without using bedrails? 4  -KP     Moving from lying on back to sitting on the side of a flat bed without bedrails? 4  -KP     Moving to and from a bed to a chair (including a wheelchair)? 4  -KP     Standing up from a chair using your arms (e.g., wheelchair, bedside chair)? 4  -KP     Climbing 3-5 steps with a railing? 4  -KP     To walk in hospital room? 4  -KP     AM-PAC 6 Clicks Score (PT) 24  -KP     Highest level of mobility 8 --> Walked 250 feet or more  -KP     Row Name 04/28/23 1537          Modified Yuri Scale    Modified [place] Scale 1 - No significant disability despite symptoms.  Able to carry out all usual duties and activities.  -     Row Name 04/28/23 1537          Functional Assessment    Outcome Measure Options AM-PAC 6 Clicks  Daily Activity (OT);Modified Yuri  -           User Key  (r) = Recorded By, (t) = Taken By, (c) = Cosigned By    Initials Name Provider Type    Aniyah Johnson, RN Registered Nurse    Dalia Cameron OT Occupational Therapist                Occupational Therapy Education     Title: PT OT SLP Therapies (Done)     Topic: Occupational Therapy (Done)     Point: ADL training (Done)     Description:   Instruct learner(s) on proper safety adaptation and remediation techniques during self care or transfers.   Instruct in proper use of assistive devices.              Learning Progress Summary           Patient Acceptance, E, VU by FABY at 4/28/2023 1537    Comment: role of OT   Family Acceptance, E, VU by FABY at 4/28/2023 1537    Comment: role of OT                   Point: Home exercise program (Done)     Description:   Instruct learner(s) on appropriate technique for monitoring, assisting and/or progressing therapeutic exercises/activities.              Learning Progress Summary           Patient Acceptance, E, VU by FABY at 4/28/2023 1537    Comment: role of OT   Family Acceptance, E, VU by FABY at 4/28/2023 1537    Comment: role of OT                   Point: Precautions (Done)     Description:   Instruct learner(s) on prescribed precautions during self-care and functional transfers.              Learning Progress Summary           Patient Acceptance, E, VU by FABY at 4/28/2023 1537    Comment: role of OT   Family Acceptance, E, VU by FABY at 4/28/2023 1537    Comment: role of OT                   Point: Body mechanics (Done)     Description:   Instruct learner(s) on proper positioning and spine alignment during self-care, functional mobility activities and/or exercises.              Learning Progress Summary           Patient Acceptance, E, VU by FABY at 4/28/2023 1537    Comment: role of OT   Family Acceptance, E, VU by FABY at 4/28/2023 1537    Comment: role of OT                               User Key     Initials  Effective Dates Name Provider Type Discipline     08/20/21 -  Dalia Christiansno OT Occupational Therapist OT              OT Recommendation and Plan     Plan of Care Review  Plan of Care Reviewed With: patient, spouse  Progress: no change  Outcome Evaluation: Pt is a 76 yo male admitted from home with c/o syncopal episode and N/V. Hx severe OH, prostate cancer. Pt seen for OT eval, A&Ox3, (I) at baseline with ADLs and mobility. Pt moving well this date, SBA for bed mob, STS and mob with SBA and no AD, no concerns for ADL task completion. Pt with orthostatics assessed. Sitting 130/82, standing 1 min 114/77, standing 3 mins 104/86. Pt with no reports of dizziness, educated on no acute OT needs during stay, will sign off. Family and RN aware.     Time Calculation:    Time Calculation- OT     Row Name 04/28/23 1538             Time Calculation- OT    OT Start Time 1416  -MW      OT Stop Time 1434  -MW      OT Time Calculation (min) 18 min  -MW      OT Received On 04/28/23  -MW         Untimed Charges    OT Eval/Re-eval Minutes 18  -MW         Total Minutes    Untimed Charges Total Minutes 18  -MW       Total Minutes 18  -MW            User Key  (r) = Recorded By, (t) = Taken By, (c) = Cosigned By    Initials Name Provider Type     Dalia Christianson OT Occupational Therapist              Therapy Charges for Today     Code Description Service Date Service Provider Modifiers Qty    97079452254 HC OT EVAL LOW COMPLEXITY 3 4/28/2023 Dalia Christianson OT GO 1               Dalia Christianson OT  4/28/2023

## 2023-04-28 NOTE — PLAN OF CARE
"Goal Outcome Evaluation:  Plan of Care Reviewed With: (P) patient, spouse           Outcome Evaluation: (P) Pt is a 78 y/o male admitted to [facility] following a syncopal episode at home and N/V. Pt has Hx of OH and prostate ca. Pt A+Ox3 this PM for evaluation w/ spouse present in room. Pt IND w/ ADLs and mobility at baseline w/ no AD. Pt reports walking 3-4 miles every day, and states that he felt fine up until the last second when the snycope \"came out of nowhere.\" Today pt came to EOB sba, STS sba, and amb 75' sba + no AD. Orthostatics assessed: sitting 130/82, standing 1 min 114/77, standing 3 min 104/86. Pt had no c/o dizziness, light head, etc. Acute PT services not indicated at this time. No concerns for return to home once cleared from a medical standpoint.  "

## 2023-04-28 NOTE — CONSULTS
[facility] Consult    Patient Name: Danni Figueroa  Age/Sex: 77 y.o. male  : 1945  MRN: [id number]    Date of Admission: 2023  Date of Encounter Visit: 23  Encounter Provider: Carrie Castañeda MD  Referring Provider: Derrick Brothers MD  Place of Service: [facility]  Patient Care Team:  Ya Stallings MD as PCP - General (Internal Medicine)  Katheryn Haider MD as Consulting Physician (Hematology and Oncology)  Ciro Castaneda MD as Referring Physician (General Surgery)    Subjective:     Consulted for: Syncope    Chief Complaint: Syncope    History of Present Illness:  Danni Figueroa is a 77 y.o. 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    The patient has a longstanding history of bradycardia.  He initially appeared to be asymptomatic.  An echocardiogram during my initial evaluation with him in  was unremarkable.  In 3/2022 he suffered a syncopal episode.  An event monitor was placed at that time that showed no significant episodes of bradycardia and 1 episode of wide-complex tachycardia.  Prior to that office visit he was already started on fludrocortisone by Dr. Stallings.  Over the course of the last year for the most part he has done well.  He was last seen in our office in 3/2022 at which time he appears to be doing well.    The patient and his family report that he was at [employer] yesterday.  He was standing in chatting with some people and eating.  He suddenly began feeling lightheaded and nauseous and noted some increased warmth.  He knew that he was about to pass out and asked to sit down.  Both his wife and another person present held onto him as he slid to the floor.  His wife reports that he appeared to be diaphoretic and clammy.  He ended up losing  "consciousness at this point.  The patient's wife indicates that he appeared to be poorly responsive for longer than normal.  There was a nurse present at [facility] [employer] [facility] and she checked his pulse on reported that it was \"thready\".  She also attempted to check his blood pressure but apparently the blood pressure cuff could not read his blood pressure.  EMS was called and is brought to the emergency room.    Following his arrival to the emergency room his work-up was unremarkable including troponins and EKG.  His creatinine was mildly elevated from his usual baseline.  BNP was normal.  Chest x-ray was unremarkable.    The patient's wife indicates that the patient did complain that he was not feeling very well after he had an MRI of his brain performed yesterday.  This had been ordered for work-up of tremors, shuffling gait, and unexplained weight loss over the last 3 months.  Outside of that the patient ate a normal lunch before his episode yesterday.  He otherwise denies any recent chest pain, shortness of breath, palpitations, orthopnea, or lower extremity edema.      He reports he has been taking his fludrocortisone for the most part daily but has noted that his blood pressures have been running a little lower than usual on the fludrocortisone in the 110s to 120s.       Past Medical History:  Past Medical History:   Diagnosis Date   • Diabetes mellitus    • History of alcohol abuse    • Hypertension    • Low grade mucinous neoplasm of appendix    • Peripheral vertigo    • Prostate carcinoma        Past Surgical History:   Procedure Laterality Date   • APPENDECTOMY  1993   • COLON SURGERY  06/2014    Right hemicolectomy-Pericecal mass   • COLONOSCOPY     • PROSTATE SURGERY  2014       Home Medications:   Medications Prior to Admission   Medication Sig Dispense Refill Last Dose   • donepezil (ARICEPT) 5 MG tablet Take 1 tablet by mouth Daily.   4/27/2023 at 0730   • fludrocortisone 0.1 MG tablet TAKE 1 TABLET BY " MOUTH ONCE A DAY TO PREVENT PASSING OUT AND LOW BLOOD PRESSURE   4/27/2023 at 0730   • meclizine (ANTIVERT) 25 MG tablet Take 1 tablet by mouth 3 (Three) Times a Day As Needed.   4/27/2023 at 0730   • predniSONE (DELTASONE) 1 MG tablet Take 1 tablet by mouth Daily.   4/27/2023 at 0730   • tadalafil (CIALIS) 5 MG tablet Take 1 tablet by mouth Daily.   Unknown       Allergies:  No Known Allergies    Past Social History:  Social History     Socioeconomic History   • Marital status:      Spouse name: Paula   • Years of education: College   Tobacco Use   • Smoking status: Former     Packs/day: 1.00     Years: 10.00     Pack years: 10.00     Types: Cigarettes   • Smokeless tobacco: Never   • Tobacco comments:     caffeine use   Vaping Use   • Vaping Use: Never used   Substance and Sexual Activity   • Alcohol use: No     Comment: 29 year former alcoholic    • Drug use: No   • Sexual activity: Defer       Past Family History:  Family History   Problem Relation Age of Onset   • Prostate cancer Brother    • Heart failure Brother    • Diabetes Other    • Heart failure Brother    • Heart failure Son        Review of Systems:   All systems reviewed. Pertinent positives identified in HPI. All other systems are negative.    Objective:   Temp:  [97.9 °F (36.6 °C)-98.4 °F (36.9 °C)] 98.4 °F (36.9 °C)  Heart Rate:  [51-65] 56  Resp:  [16-18] 16  BP: (124-135)/(80-92) 133/87   No intake or output data in the 24 hours ending 04/28/23 0719  Body mass index is 26.62 kg/m².      04/27/23 2057 04/28/23  0500   Weight: 88.5 kg (195 lb) 83.4 kg (183 lb 13.8 oz)     Weight change:     Physical Exam:   General Appearance:    Alert, cooperative, in no acute distress   Head:    Normocephalic, without obvious abnormality, atraumatic   Eyes:            Lids and lashes normal, conjunctivae and sclerae normal, no   icterus, no pallor, corneas clear, PERRLA   Ears:    Ears appear intact with no abnormalities noted   Neck:   No adenopathy,  supple, trachea midline, no thyromegaly, no   carotid bruit, no JVD   Lungs:     Clear to auscultation,respirations regular, even and unlabored    Heart:    Regular rhythm and normal rate, normal S1 and S2, no murmur, no gallop, no rub, no click   Chest Wall:    No abnormalities observed   Abdomen:     Normal bowel sounds, no masses, no organomegaly, soft        non-tender, non-distended, no guarding, no rebound  tenderness   Extremities:   Moves all extremities well, no edema, no cyanosis, no redness   Pulses:   Pulses palpable and equal bilaterally. Normal radial, carotid, femoral, dorsalis pedis and posterior tibial pulses bilaterally. Normal abdominal aorta   Skin:  Psychiatric:   No bleeding, bruising or rash    Alert and oriented x 3, normal mood and affect       Lab Review:   Results from last 7 days   Lab Units 04/27/23  2101   SODIUM mmol/L 139   POTASSIUM mmol/L 4.3   CHLORIDE mmol/L 105   CO2 mmol/L 26.1   BUN mg/dL 17   CREATININE mg/dL 1.57*   GLUCOSE mg/dL 168*   CALCIUM mg/dL 9.5   AST (SGOT) U/L 15   ALT (SGPT) U/L 12     Results from last 7 days   Lab Units 04/27/23  2256 04/27/23  2101   HSTROP T ng/L 33* 26*     Results from last 7 days   Lab Units 04/27/23  2101   WBC 10*3/mm3 8.67   HEMOGLOBIN g/dL 12.4*   HEMATOCRIT % 38.6   PLATELETS 10*3/mm3 301     Results from last 7 days   Lab Units 04/27/23  2101   INR  1.10   APTT seconds 24.0     Results from last 7 days   Lab Units 04/27/23  2101   MAGNESIUM mg/dL 2.3           Invalid input(s): LDLCALC  Results from last 7 days   Lab Units 04/27/23  2101   PROBNP pg/mL 171.0           Echo EF Estimated  No results found for: ECHOEFEST    EKG:     Imaging:  Imaging Results (Most Recent)     Procedure Component Value Units Date/Time    XR Chest 1 View [[id number]] Collected: 04/27/23 2128     Updated: 04/27/23 2132    Narrative:      XR CHEST 1 VW-     Clinical: Shortness of breath     COMPARISON CT chest 12/22/2014     FINDINGS: Similar to the previous  CT examination there is elevation of  the right hemidiaphragm. Cardiac size upper limits of normal. No pleural  effusion, vascular congestion nor acute airspace disease is  demonstrated.     CONCLUSION: No acute pulmonary process is demonstrated, elevated right  hemidiaphragm similar to 2014.     This report was finalized on 4/27/2023 9:29 PM by Dr. Ismael Patino M.D.             I personally viewed and interpreted the patient's EKG    Assessment/Plan:     1.  Syncope.  Symptoms still sound orthostatic or possibly vasovagal although I am unclear what the trigger.  This is despite taking fludrocortisone on daily basis.  Wonder if this is worsened by his recent weight loss.  His cardiac work-up otherwise so far is unremarkable.  2.  Bradycardia.  This is a chronic issue.  So far has not had any evidence of significant bradycardia arrhythmias that could be responsible for syncope.    - Agree with checking orthostatics today.  We will follow-up on the results.  - Depending on how he does this morning and his orthostatic vital signs will consider switching fludrocortisone to midodrine.  - We will plan for ZIO monitor at discharge.    Thank you for allowing me to participate in the care of Danni Figueroa. Feel free to contact me directly with any further questions or concerns.    Carrie Castañeda MD  [facility]  04/28/23  07:19 EDT    ADDENDUM:  Orthostatic still positive despite fludrocortisone.  We will start midodrine 5 mg 3 times daily.  We will stop fludrocortisone for now.

## 2023-04-28 NOTE — ED PROVIDER NOTES
EMERGENCY DEPARTMENT ENCOUNTER    Room Number:  P577/1  Date of encounter:  4/28/2023  PCP: Ya Stallings MD  Patient Care Team:  Ya Stallings MD as PCP - General (Internal Medicine)  Katheryn Haider MD as Consulting Physician (Hematology and Oncology)  Ciro Castaneda MD as Referring Physician (General Surgery)   Independent Historians: Patient, family    HPI:  Chief Complaint: Syncope   A complete HPI/ROS/PMH/PSH/SH/FH are unobtainable due to: None    Chronic or social conditions impacting patient care (Social Determinants of Health): None  (Financial Resource Strain / Food Insecurity / Transportation Needs / Physical Activity / Stress / Social Connections / Intimate Partner Violence / Housing Stability)    Context: Danni Figueroa is a 77 y.o. male who presents to the ED c/o syncopal episode this evening.  Patient states he can feel lightheaded and passed out.  Family states that they were at his side and he did not strike his head on the ground.  States he just sort of crumpled and lost consciousness.  Denies chest pain or shortness of breath.  States he is passed out before family states that this episode lasted longer than normal.  Patient described as having a slow heart rate and thready pulse.  Patient states that he feels completely better.  Has no acute complaints at this time.    Review of prior external notes (non-ED): I have reviewed patient's cardiology note from 3/16/2023    Review of prior external test results outside of this encounter: Laboratory evaluation dated 12/6/2022      PAST MEDICAL HISTORY  Active Ambulatory Problems     Diagnosis Date Noted   • Low grade mucinous neoplasm of appendix 08/24/2016   • Prostate cancer 08/24/2016   • Polymyalgia rheumatica 08/24/2016   • Bradycardia, sinus 03/28/2022   • Syncope and collapse 09/08/2022     Resolved Ambulatory Problems     Diagnosis Date Noted   • No Resolved Ambulatory Problems     Past Medical History:   Diagnosis Date    • Diabetes mellitus    • History of alcohol abuse    • Hypertension    • Peripheral vertigo    • Prostate carcinoma        The patient has a COVID HM Topic on their chart, and they are fully vaccinated.    PAST SURGICAL HISTORY  Past Surgical History:   Procedure Laterality Date   • APPENDECTOMY  1993   • COLON SURGERY  06/2014    Right hemicolectomy-Pericecal mass   • COLONOSCOPY     • PROSTATE SURGERY  2014         FAMILY HISTORY  Family History   Problem Relation Age of Onset   • Prostate cancer Brother    • Heart failure Brother    • Diabetes Other    • Heart failure Brother    • Heart failure Son          SOCIAL HISTORY  Social History     Socioeconomic History   • Marital status:      Spouse name: Paula   • Years of education: College   Tobacco Use   • Smoking status: Former     Packs/day: 1.00     Years: 10.00     Pack years: 10.00     Types: Cigarettes   • Smokeless tobacco: Never   • Tobacco comments:     caffeine use   Vaping Use   • Vaping Use: Never used   Substance and Sexual Activity   • Alcohol use: No     Comment: 29 year former alcoholic    • Drug use: No   • Sexual activity: Defer         ALLERGIES  Patient has no known allergies.        REVIEW OF SYSTEMS  Review of Systems     All systems reviewed and negative except for those discussed in HPI.       PHYSICAL EXAM    I have reviewed the triage vital signs and nursing notes.    ED Triage Vitals   Temp Heart Rate Resp BP SpO2   04/27/23 2035 04/27/23 2035 04/27/23 2035 04/27/23 2035 04/27/23 2035   98.2 °F (36.8 °C) 65 16 124/83 99 %      Temp src Heart Rate Source Patient Position BP Location FiO2 (%)   04/28/23 0100 04/28/23 0100 04/28/23 0100 04/28/23 0100 --   Oral Monitor Lying Right arm        Physical Exam  GENERAL: alert, no acute distress  SKIN: Warm, dry  HENT: Normocephalic, atraumatic  EYES: no scleral icterus  CV: regular rhythm, regular rate  RESPIRATORY: normal effort, lungs clear  ABDOMEN: soft, nontender,  nondistended  MUSCULOSKELETAL: no deformity  NEURO: alert, moves all extremities, follows commands                                                               LAB RESULTS  Recent Results (from the past 24 hour(s))   ECG 12 Lead Syncope    Collection Time: 04/27/23  8:47 PM   Result Value Ref Range    QT Interval 318 ms   Comprehensive Metabolic Panel    Collection Time: 04/27/23  9:01 PM    Specimen: Blood   Result Value Ref Range    Glucose 168 (H) 65 - 99 mg/dL    BUN 17 8 - 23 mg/dL    Creatinine 1.57 (H) 0.76 - 1.27 mg/dL    Sodium 139 136 - 145 mmol/L    Potassium 4.3 3.5 - 5.2 mmol/L    Chloride 105 98 - 107 mmol/L    CO2 26.1 22.0 - 29.0 mmol/L    Calcium 9.5 8.6 - 10.5 mg/dL    Total Protein 6.7 6.0 - 8.5 g/dL    Albumin 3.7 3.5 - 5.2 g/dL    ALT (SGPT) 12 1 - 41 U/L    AST (SGOT) 15 1 - 40 U/L    Alkaline Phosphatase 84 39 - 117 U/L    Total Bilirubin <0.2 0.0 - 1.2 mg/dL    Globulin 3.0 gm/dL    A/G Ratio 1.2 g/dL    BUN/Creatinine Ratio 10.8 7.0 - 25.0    Anion Gap 7.9 5.0 - 15.0 mmol/L    eGFR 45.7 (L) >60.0 mL/min/1.73   Protime-INR    Collection Time: 04/27/23  9:01 PM    Specimen: Blood   Result Value Ref Range    Protime 14.3 (H) 11.7 - 14.2 Seconds    INR 1.10 0.90 - 1.10   aPTT    Collection Time: 04/27/23  9:01 PM    Specimen: Blood   Result Value Ref Range    PTT 24.0 22.7 - 35.4 seconds   BNP    Collection Time: 04/27/23  9:01 PM    Specimen: Blood   Result Value Ref Range    proBNP 171.0 0.0 - 1,800.0 pg/mL   High Sensitivity Troponin T    Collection Time: 04/27/23  9:01 PM    Specimen: Blood   Result Value Ref Range    HS Troponin T 26 (H) <15 ng/L   Magnesium    Collection Time: 04/27/23  9:01 PM    Specimen: Blood   Result Value Ref Range    Magnesium 2.3 1.6 - 2.4 mg/dL   CBC Auto Differential    Collection Time: 04/27/23  9:01 PM    Specimen: Blood   Result Value Ref Range    WBC 8.67 3.40 - 10.80 10*3/mm3    RBC 4.77 4.14 - 5.80 10*6/mm3    Hemoglobin 12.4 (L) 13.0 - 17.7 g/dL     Hematocrit 38.6 37.5 - 51.0 %    MCV 80.9 79.0 - 97.0 fL    MCH 26.0 (L) 26.6 - 33.0 pg    MCHC 32.1 31.5 - 35.7 g/dL    RDW 13.7 12.3 - 15.4 %    RDW-SD 39.2 37.0 - 54.0 fl    MPV 9.7 6.0 - 12.0 fL    Platelets 301 140 - 450 10*3/mm3    Neutrophil % 74.1 42.7 - 76.0 %    Lymphocyte % 17.4 (L) 19.6 - 45.3 %    Monocyte % 6.1 5.0 - 12.0 %    Eosinophil % 1.3 0.3 - 6.2 %    Basophil % 0.5 0.0 - 1.5 %    Immature Grans % 0.6 (H) 0.0 - 0.5 %    Neutrophils, Absolute 6.43 1.70 - 7.00 10*3/mm3    Lymphocytes, Absolute 1.51 0.70 - 3.10 10*3/mm3    Monocytes, Absolute 0.53 0.10 - 0.90 10*3/mm3    Eosinophils, Absolute 0.11 0.00 - 0.40 10*3/mm3    Basophils, Absolute 0.04 0.00 - 0.20 10*3/mm3    Immature Grans, Absolute 0.05 0.00 - 0.05 10*3/mm3    nRBC 0.1 0.0 - 0.2 /100 WBC   High Sensitivity Troponin T 2Hr    Collection Time: 04/27/23 10:56 PM    Specimen: Blood   Result Value Ref Range    HS Troponin T 33 (H) <15 ng/L    Troponin T Delta 7 (C) >=-4 - <+4 ng/L       Ordered the above labs and independently reviewed the results.        RADIOLOGY  XR Chest 1 View    Result Date: 4/27/2023  XR CHEST 1 VW-  Clinical: Shortness of breath  COMPARISON CT chest 12/22/2014  FINDINGS: Similar to the previous CT examination there is elevation of the right hemidiaphragm. Cardiac size upper limits of normal. No pleural effusion, vascular congestion nor acute airspace disease is demonstrated.  CONCLUSION: No acute pulmonary process is demonstrated, elevated right hemidiaphragm similar to 2014.  This report was finalized on 4/27/2023 9:29 PM by Dr. Ismael Patino M.D.      MRI outside films    Result Date: 4/27/2023  This procedure was auto-finalized with no dictation required.      I ordered the above noted radiological studies. Reviewed by me and discussed with radiologist.  See dictation for official radiology interpretation.      PROCEDURES    Procedures      MEDICATIONS GIVEN IN ER    Medications   sodium chloride 0.9 % flush 10 mL  (has no administration in time range)         ORDERS PLACED DURING THIS VISIT:  Orders Placed This Encounter   Procedures   • XR Chest 1 View   • Comprehensive Metabolic Panel   • Protime-INR   • aPTT   • BNP   • High Sensitivity Troponin T   • Magnesium   • CBC Auto Differential   • High Sensitivity Troponin T 2Hr   • Monitor Blood Pressure   • Cardiac Monitoring   • Pulse Oximetry, Continuous   • LIPPS (on-call MD unless specified)   • Inpatient Nutrition Consult   • ECG 12 Lead Syncope   • Insert Peripheral IV   • Initiate Observation Status   • CBC & Differential         PROGRESS, DATA ANALYSIS, CONSULTS, AND MEDICAL DECISION MAKING    All labs have been independently interpreted by me.  All radiology studies have been reviewed by me and discussed with radiologist dictating the report.   EKG's independently viewed and interpreted by me.  Discussion below represents my analysis of pertinent findings related to patient's condition, differential diagnosis, treatment plan and final disposition.    My differential diagnosis includes but is not limited to:  Vasovagal reflex - situational stimulus, micturition, defecation, cough, sneezing, swallowing, postprandial state, react sinus hypersensitivity  Vascular-prolonged recumbency, sudden postural change, prolonged standing, hypovolemia, vasodilator drugs, autonomic neuropathy, adrenal insufficiency, subclavian steal, pulmonary embolism  Cardiac -arrhythmia, heart block, myocardial infarction, aortic stenosis, cardiac myxoma, cardiac, LV Dysfunction, Aortic Dissection, Pulmonary Hypertension, Pulmonary Stenosis, Pacemaker Failure  CNS-seizure, hypoxia, hypoglycemia, TIA,(basal vertebral), hydrocephalus      ED Course as of 04/28/23 0251   Thu Apr 27, 2023 2147 XR Chest 1 View [TJ]   2147 I have independently reviewed patient's chest x-ray; my interpretation is negative [TJ]   Fri Apr 28, 2023   0250 EKG          EKG time: 2047  Rhythm/Rate: Sinus rhythm rate 56  P  waves and UT: Normal  QRS, axis: Narrow regular  ST and T waves: Nonspecific    Interpreted Contemporaneously by me, independently viewed [TJ]   0250 Discussed findings with patient given concerning change in nature of syncope I will admit the patient for further evaluation and work-up.  Patient family agreeable with this plan. [TJ]      ED Course User Index  [TJ] Fei Clark MD       I interpreted the cardiac monitor rhythm and my independent interpretation is: normal sinus rhythm.     PPE: The patient wore a mask and I wore an N95 mask throughout the entire patient encounter.       AS OF 02:51 EDT VITALS:    BP - 135/80  HR - 55  TEMP - 97.9 °F (36.6 °C) (Oral)  O2 SATS - 100%        DIAGNOSIS  Final diagnoses:   Syncope, unspecified syncope type         DISPOSITION  ED Disposition     ED Disposition   Decision to Admit    Condition   --    Comment   Level of Care: Telemetry [5]   Diagnosis: Syncope, unspecified syncope type [[id number]]   Admitting Physician: KEN MCFARLANE [[id number]]                  Note Disclaimer: At [facility], we believe that sharing information builds trust and better relationships. You are receiving this note because you recently visited [facility]. It is possible you will see health information before a provider has talked with you about it. This kind of information can be easy to misunderstand. To help you fully understand what it means for your health, we urge you to discuss this note with your provider.       Fei Clark MD  04/28/23 0251

## 2023-04-28 NOTE — SIGNIFICANT NOTE
Second set of orthostatics 3 hours post fluid rate change to 125 mL/hr:       04/28/23 1335 04/28/23 1340 04/28/23 1346   Vital Signs   Heart Rate 54 54 71   Heart Rate Source Monitor Monitor Monitor   BP (!) 156/105 136/88 107/77   Noninvasive MAP (mmHg) 138 112 89   BP Location Right arm Right arm Right arm   BP Method Automatic Automatic Automatic   Patient Position Lying Sitting Standing   Oxygen Therapy   SpO2 99 % 98 % 99 %   Pulse Oximetry Type Continuous Continuous Continuous

## 2023-04-29 LAB
ALBUMIN SERPL-MCNC: 3.1 G/DL (ref 3.5–5.2)
ALBUMIN/GLOB SERPL: 1.1 G/DL
ALP SERPL-CCNC: 66 U/L (ref 39–117)
ALT SERPL W P-5'-P-CCNC: 9 U/L (ref 1–41)
ANION GAP SERPL CALCULATED.3IONS-SCNC: 6.8 MMOL/L (ref 5–15)
AST SERPL-CCNC: 10 U/L (ref 1–40)
BACTERIA BLD CULT: ABNORMAL
BACTERIA UR QL AUTO: ABNORMAL /HPF
BASOPHILS # BLD AUTO: 0.04 10*3/MM3 (ref 0–0.2)
BASOPHILS NFR BLD AUTO: 0.4 % (ref 0–1.5)
BILIRUB SERPL-MCNC: 1.1 MG/DL (ref 0–1.2)
BILIRUB UR QL STRIP: NEGATIVE
BOTTLE TYPE: ABNORMAL
BUN SERPL-MCNC: 14 MG/DL (ref 8–23)
BUN/CREAT SERPL: 11 (ref 7–25)
CALCIUM SPEC-SCNC: 8.8 MG/DL (ref 8.6–10.5)
CHLORIDE SERPL-SCNC: 108 MMOL/L (ref 98–107)
CHOLEST SERPL-MCNC: 120 MG/DL (ref 0–200)
CLARITY UR: ABNORMAL
CO2 SERPL-SCNC: 26.2 MMOL/L (ref 22–29)
COLOR UR: YELLOW
CREAT SERPL-MCNC: 1.27 MG/DL (ref 0.76–1.27)
DEPRECATED RDW RBC AUTO: 39.3 FL (ref 37–54)
EGFRCR SERPLBLD CKD-EPI 2021: 58.9 ML/MIN/1.73
EOSINOPHIL # BLD AUTO: 0.03 10*3/MM3 (ref 0–0.4)
EOSINOPHIL NFR BLD AUTO: 0.3 % (ref 0.3–6.2)
ERYTHROCYTE [DISTWIDTH] IN BLOOD BY AUTOMATED COUNT: 13.7 % (ref 12.3–15.4)
GEN 5 2HR TROPONIN T REFLEX: 45 NG/L
GLOBULIN UR ELPH-MCNC: 2.9 GM/DL
GLUCOSE SERPL-MCNC: 112 MG/DL (ref 65–99)
GLUCOSE UR STRIP-MCNC: NEGATIVE MG/DL
HBA1C MFR BLD: 5.9 % (ref 4.8–5.6)
HCT VFR BLD AUTO: 34.5 % (ref 37.5–51)
HDLC SERPL-MCNC: 42 MG/DL (ref 40–60)
HGB BLD-MCNC: 11.2 G/DL (ref 13–17.7)
HGB UR QL STRIP.AUTO: ABNORMAL
HYALINE CASTS UR QL AUTO: ABNORMAL /LPF
IMM GRANULOCYTES # BLD AUTO: 0.04 10*3/MM3 (ref 0–0.05)
IMM GRANULOCYTES NFR BLD AUTO: 0.4 % (ref 0–0.5)
KETONES UR QL STRIP: NEGATIVE
LDLC SERPL CALC-MCNC: 66 MG/DL (ref 0–100)
LDLC/HDLC SERPL: 1.6 {RATIO}
LEUKOCYTE ESTERASE UR QL STRIP.AUTO: ABNORMAL
LYMPHOCYTES # BLD AUTO: 1.45 10*3/MM3 (ref 0.7–3.1)
LYMPHOCYTES NFR BLD AUTO: 12.9 % (ref 19.6–45.3)
MCH RBC QN AUTO: 25.7 PG (ref 26.6–33)
MCHC RBC AUTO-ENTMCNC: 32.5 G/DL (ref 31.5–35.7)
MCV RBC AUTO: 79.1 FL (ref 79–97)
MONOCYTES # BLD AUTO: 0.94 10*3/MM3 (ref 0.1–0.9)
MONOCYTES NFR BLD AUTO: 8.4 % (ref 5–12)
NEUTROPHILS NFR BLD AUTO: 77.6 % (ref 42.7–76)
NEUTROPHILS NFR BLD AUTO: 8.71 10*3/MM3 (ref 1.7–7)
NITRITE UR QL STRIP: POSITIVE
NRBC BLD AUTO-RTO: 0 /100 WBC (ref 0–0.2)
PH UR STRIP.AUTO: 5.5 [PH] (ref 5–8)
PLATELET # BLD AUTO: 245 10*3/MM3 (ref 140–450)
PMV BLD AUTO: 9.6 FL (ref 6–12)
POTASSIUM SERPL-SCNC: 4 MMOL/L (ref 3.5–5.2)
PROT SERPL-MCNC: 6 G/DL (ref 6–8.5)
PROT UR QL STRIP: ABNORMAL
PSA SERPL-MCNC: 1 NG/ML (ref 0–4)
RBC # BLD AUTO: 4.36 10*6/MM3 (ref 4.14–5.8)
RBC # UR STRIP: ABNORMAL /HPF
REF LAB TEST METHOD: ABNORMAL
SODIUM SERPL-SCNC: 141 MMOL/L (ref 136–145)
SP GR UR STRIP: 1.01 (ref 1–1.03)
SQUAMOUS #/AREA URNS HPF: ABNORMAL /HPF
TRIGL SERPL-MCNC: 53 MG/DL (ref 0–150)
TROPONIN T DELTA: 2 NG/L
TROPONIN T SERPL HS-MCNC: 43 NG/L
TSH SERPL DL<=0.05 MIU/L-ACNC: 2.96 UIU/ML (ref 0.27–4.2)
UROBILINOGEN UR QL STRIP: ABNORMAL
VLDLC SERPL-MCNC: 12 MG/DL (ref 5–40)
WBC # UR STRIP: ABNORMAL /HPF
WBC NRBC COR # BLD: 11.21 10*3/MM3 (ref 3.4–10.8)

## 2023-04-29 PROCEDURE — 83036 HEMOGLOBIN GLYCOSYLATED A1C: CPT | Performed by: HOSPITALIST

## 2023-04-29 PROCEDURE — 84153 ASSAY OF PSA TOTAL: CPT | Performed by: INTERNAL MEDICINE

## 2023-04-29 PROCEDURE — 85025 COMPLETE CBC W/AUTO DIFF WBC: CPT | Performed by: HOSPITALIST

## 2023-04-29 PROCEDURE — 93010 ELECTROCARDIOGRAM REPORT: CPT | Performed by: INTERNAL MEDICINE

## 2023-04-29 PROCEDURE — 93005 ELECTROCARDIOGRAM TRACING: CPT | Performed by: INTERNAL MEDICINE

## 2023-04-29 PROCEDURE — 25010000002 PIPERACILLIN SOD-TAZOBACTAM PER 1 G: Performed by: HOSPITALIST

## 2023-04-29 PROCEDURE — 99232 SBSQ HOSP IP/OBS MODERATE 35: CPT | Performed by: INTERNAL MEDICINE

## 2023-04-29 PROCEDURE — 80053 COMPREHEN METABOLIC PANEL: CPT | Performed by: HOSPITALIST

## 2023-04-29 PROCEDURE — G0378 HOSPITAL OBSERVATION PER HR: HCPCS

## 2023-04-29 PROCEDURE — 84484 ASSAY OF TROPONIN QUANT: CPT | Performed by: NURSE PRACTITIONER

## 2023-04-29 PROCEDURE — 80061 LIPID PANEL: CPT | Performed by: HOSPITALIST

## 2023-04-29 PROCEDURE — 63710000001 PREDNISONE PER 5 MG: Performed by: HOSPITALIST

## 2023-04-29 PROCEDURE — 84443 ASSAY THYROID STIM HORMONE: CPT | Performed by: HOSPITALIST

## 2023-04-29 RX ADMIN — DONEPEZIL HYDROCHLORIDE 5 MG: 5 TABLET, FILM COATED ORAL at 10:50

## 2023-04-29 RX ADMIN — PREDNISONE 1 MG: 1 TABLET ORAL at 10:50

## 2023-04-29 RX ADMIN — PANTOPRAZOLE SODIUM 40 MG: 40 TABLET, DELAYED RELEASE ORAL at 06:37

## 2023-04-29 RX ADMIN — MIDODRINE HYDROCHLORIDE 5 MG: 5 TABLET ORAL at 10:50

## 2023-04-29 RX ADMIN — SODIUM CHLORIDE 125 ML/HR: 9 INJECTION, SOLUTION INTRAVENOUS at 03:26

## 2023-04-29 RX ADMIN — TAZOBACTAM SODIUM AND PIPERACILLIN SODIUM 3.38 G: 375; 3 INJECTION, SOLUTION INTRAVENOUS at 10:50

## 2023-04-29 RX ADMIN — TAZOBACTAM SODIUM AND PIPERACILLIN SODIUM 3.38 G: 375; 3 INJECTION, SOLUTION INTRAVENOUS at 18:37

## 2023-04-29 RX ADMIN — TAZOBACTAM SODIUM AND PIPERACILLIN SODIUM 3.38 G: 375; 3 INJECTION, SOLUTION INTRAVENOUS at 03:22

## 2023-04-29 RX ADMIN — MIDODRINE HYDROCHLORIDE 5 MG: 5 TABLET ORAL at 18:42

## 2023-04-29 NOTE — PROGRESS NOTES
[facility] Clinical Pharmacy Services: Piperacillin-Tazobactam Consult    Pt Name: Danni Figueroa   : 1945    Indication: Sepsis    Relevant clinical data and objective history reviewed:    Past Medical History:   Diagnosis Date   • Diabetes mellitus    • History of alcohol abuse    • Hypertension    • Low grade mucinous neoplasm of appendix    • Peripheral vertigo    • Prostate carcinoma      Creatinine   Date Value Ref Range Status   2023 1.57 (H) 0.76 - 1.27 mg/dL Final   2023 1.70 (H) 0.60 - 1.30 mg/dL Final     Comment:     Serial Number: [id number]:  [id number]   2022 1.28 0.70 - 1.30 mg/dL Final   2022 1.39 (H) 0.70 - 1.30 mg/dL Final   2019 1.20 0.60 - 1.30 mg/dL Final     Comment:     Serial Number: [id number]:  [id number]     BUN   Date Value Ref Range Status   2023 17 8 - 23 mg/dL Final     Estimated Creatinine Clearance: 48 mL/min (A) (by C-G formula based on SCr of 1.57 mg/dL (H)).    Lab Results   Component Value Date    WBC 8.67 2023     Temp Readings from Last 3 Encounters:   23 (!) 101.2 °F (38.4 °C) (Oral)   22 97.4 °F (36.3 °C) (Temporal)   22 97.3 °F (36.3 °C) (Temporal)      Assessment/Plan  • Estimated CrCl >20 mL/min at this time; BMI 26.62 kg/m2  • Will start piperacillin-tazobactam 3.375 g IV every 8 hours     Pharmacy will continue to follow daily while on piperacillin-tazobactam and adjust as needed. Thank you for this consult.    Cornelio Mattson [facility]  Clinical Pharmacist

## 2023-04-29 NOTE — CONSULTS
CONSULT NOTE    Infectious Diseases - Cely Marshall MD  [facility]       Patient Identification:  Name: Danni Figueroa  Age: 77 y.o.  Sex: male  :  1945  MRN: [id number]             Date of Consultation: 2023      Primary Care Physician: Ya Stallings MD                               Requesting Physician: Dr. 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  About couple of weeks ago when his wife noticed that he is not himself and little bit unsteady even though he felt otherwise fine and was able to do his routines including walking 3 miles a day.  Patient was seen by primary care provider on 2023 and because of his unsteadiness and tremors MRI of the brain was ordered.  Since patient has had a meal on the day of his visit to his primary care physician he was asked to come back for the lab work on 2023.  According to the patient's wife the lab work included urinalysis which showed evidence of urinary tract infection and patient was started on Bactrim which she started taking on 2023 and took it for 5 days.  According to the wife patient felt better with resolution of unsteadiness and improvement in appetite and in fact on 2023 he was able to walk 3 miles and felt good.  On 2023 patient went out to get his scheduled MRI of the brain which was performed here to evaluate for unsteadiness and tremors and and later in the  day when he came in after MRI he felt weak and tired and laid down.  Later that evening patient went out for Bible study and was with other people standing in the kitchen suddenly felt that he is going to fall and the eased him up on the floor.  Patient did have associated nausea and vomiting.  Patient did lose consciousness but regained it quickly.  Evaluation in the emergency room included CBC which was unremarkable except for mild anemia, CMP which showed blood sugar of 168 and creatinine of 1.57 elevated troponin with a T delta of 7.  Last night patient spiked a temperature to 102.4 and blood cultures were drawn and patient was started IV Zosyn empirically afterwards.  Urinalysis and urine cultures were sent.  Blood culture earlier this morning come back positive for E. coli.  Patient is feeling better.  Patient denies any neck pain back pain hip pain shoulder pain knee pain flank pain.  Because of the positive blood culture infectious disease service is consulted.  This presentation in the above context and sequence of events leading to this hospitalization and fever last night with positive blood culture is concerning for:  1-systemic E. 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    Recommendations/Discussions:  At this juncture I agree with the care plan consisting of IV Zosyn while awaiting the final sensitivity of the E. coli.  De-escalate antibiotic therapy based on the clinical course and sensitivity data.  The real issue is to figure out the source of bacteremia and it could very well be complicated urinary tract infection  with infectious syndrome could range from complicated cystitis versus acute on chronic prostatitis versus obstructive uropathy and pyelonephritis.  Patient at present does not have any localizing symptoms at this time.  Other intra-abdominal process such as intra-abdominal abscess of bowel region given the history of right hemicolectomy for mucinous adenocarcinoma as well as biliary process need to be considered.  Recommend repeating blood culture on 4/29/2023 and based on the results of above studies and work-up we will decide upon duration of antibiotic treatment as well as assessment for the need for adjunct intervention besides antibiotic therapy.  Overall concept of care discussed with patient and patient's wife at the bedside.      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  About couple of weeks ago when his wife noticed that he is not himself and little bit unsteady even though he felt otherwise fine and was able to do his routines including walking 3 miles a day.  Patient was seen by primary care provider on 4/17/2023 and because of his unsteadiness and tremors MRI of the brain was ordered.  Since patient has had a meal on the day of his visit to his primary care physician he was asked to come back for the lab work on 4/18/2023.  According to the patient's wife the lab work included urinalysis which showed evidence of urinary tract infection and patient was started on Bactrim which she started taking on  4/19/2023 and took it for 5 days.  According to the wife patient felt better with resolution of unsteadiness and improvement in appetite and in fact on 4/26/2023 he was able to walk 3 miles and felt good.  On 4/27/2023 patient went out to get his scheduled MRI of the brain which was performed here to evaluate for unsteadiness and tremors and and later in the day when he came in after MRI he felt weak and tired and laid down.  Later that evening patient went out for Bible study and was with other people standing in the kitchen suddenly felt that he is going to fall and the eased him up on the floor.  Patient did have associated nausea and vomiting.  Patient did lose consciousness but regained it quickly.  Evaluation in the emergency room included CBC which was unremarkable except for mild anemia, CMP which showed blood sugar of 168 and creatinine of 1.57 elevated troponin with a T delta of 7.  Last night patient spiked a temperature to 102.4 and blood cultures were drawn and patient was started IV Zosyn empirically afterwards.  Urinalysis and urine cultures were sent.  Blood culture earlier this morning come back positive for E. coli.  Patient is feeling better.  Patient denies any neck pain back pain hip pain shoulder pain knee pain flank pain.  Because of the positive blood culture infectious disease service is consulted.      Past Medical History:  Past Medical History:   Diagnosis Date   • Diabetes mellitus    • History of alcohol abuse    • Hypertension    • Low grade mucinous neoplasm of appendix    • Peripheral vertigo    • Prostate carcinoma      Past Surgical History:  Past Surgical History:   Procedure Laterality Date   • APPENDECTOMY  1993   • COLON SURGERY  06/2014    Right hemicolectomy-Pericecal mass   • COLONOSCOPY     • PROSTATE SURGERY  2014      Home Meds:  Medications Prior to Admission   Medication Sig Dispense Refill Last Dose   • donepezil (ARICEPT) 5 MG tablet Take 1 tablet by mouth Daily.    4/27/2023 at 0730   • fludrocortisone 0.1 MG tablet TAKE 1 TABLET BY MOUTH ONCE A DAY TO PREVENT PASSING OUT AND LOW BLOOD PRESSURE   4/27/2023 at 0730   • meclizine (ANTIVERT) 25 MG tablet Take 1 tablet by mouth 3 (Three) Times a Day As Needed.   4/27/2023 at 0730   • predniSONE (DELTASONE) 1 MG tablet Take 1 tablet by mouth Daily.   4/27/2023 at 0730   • tadalafil (CIALIS) 5 MG tablet Take 1 tablet by mouth Daily.   Unknown     Current Meds:     Current Facility-Administered Medications:   •  donepezil (ARICEPT) tablet 5 mg, 5 mg, Oral, Daily, Derrick Brothers MD  •  meclizine (ANTIVERT) tablet 25 mg, 25 mg, Oral, TID PRN, Derrick Brothers MD  •  midodrine (PROAMATINE) tablet 5 mg, 5 mg, Oral, TID AC, Carrie Castañeda MD, 5 mg at 04/28/23 1845  •  pantoprazole (PROTONIX) EC tablet 40 mg, 40 mg, Oral, Q AM, Derrick Brothers MD, 40 mg at 04/29/23 0637  •  Pharmacy to Dose Zosyn, , Does not apply, Continuous PRN, Derrick Brothers MD  •  piperacillin-tazobactam (ZOSYN) 3.375 g in iso-osmotic dextrose 50 ml (premix), 3.375 g, Intravenous, Q8H, Derrick Brothers MD, 3.375 g at 04/29/23 0322  •  predniSONE (DELTASONE) tablet 1 mg, 1 mg, Oral, Daily With Breakfast, eDrrick Brothers MD, 1 mg at 04/28/23 0917  •  [COMPLETED] Insert Peripheral IV, , , Once **AND** sodium chloride 0.9 % flush 10 mL, 10 mL, Intravenous, PRN, Fei Clark MD  •  sodium chloride 0.9 % infusion, 125 mL/hr, Intravenous, Continuous, Derrick Brothers MD, Last Rate: 125 mL/hr at 04/29/23 0326, 125 mL/hr at 04/29/23 0326  Allergies:  No Known Allergies  Social History:   Social History     Tobacco Use   • Smoking status: Former     Packs/day: 1.00     Years: 10.00     Pack years: 10.00     Types: Cigarettes   • Smokeless tobacco: Never   • Tobacco comments:     caffeine use   Substance Use Topics   • Alcohol use: No     Comment: 31 year former alcoholic       Family History:  Family History   Problem Relation Age of Onset   • Prostate cancer Brother    • Heart  "failure Brother    • Diabetes Other    • Heart failure Brother    • Heart failure Son           Review of Systems  See history of present illness and past medical history.    Constitutional: Remarkable for except for episode of passing out spell and surrounding symptoms such as nausea and vomiting patient otherwise feels fine and was feeling fine the day before.  Did admit to have decrease in appetite ongoing for the last 3 months an episode of weakness and tremors that led to the visit to his primary care physician on 4/17/2023 resulting in securing MRI of the brain.  Cardiovascular: Remarkable for no chest pain or shortness of breath except for syncopal episode as described  GI: Remarkable for decrease in appetite which is somewhat better denies any weight loss  : Remarkable for no burning in urination frequency urgency or flank pain  Musculoskeletal: Remarkable for no new joint aches and pain  Neurological: Remarkable for no focal weakness of arm or legs but did have some tremors and unsteadiness earlier.  Remainder of ROS is negative.      Vitals:   /75   Pulse 64   Temp 98.6 °F (37 °C)   Resp 18   Ht 177 cm (69.69\")   Wt 83.4 kg (183 lb 13.8 oz)   SpO2 96%   BMI 26.62 kg/m²   I/O: No intake or output data in the 24 hours ending 04/29/23 0958  Exam:  Patient is examined using the personal protective equipment as per guidelines from infection control for this particular patient as enacted.  Hand washing was performed before and after patient interaction.  General Appearance:    Alert, cooperative, no distress, appears stated age   Head:    Normocephalic, without obvious abnormality, atraumatic   Eyes:    PERRL, conjunctivae/corneas clear, EOM's intact, both eyes   Ears:    Normal external ear canals, both ears   Nose:   Nares normal, septum midline, mucosa normal, no drainage    or sinus tenderness   Throat:   Lips, tongue, gums normal; oral mucosa pink and moist   Neck:   Supple, symmetrical, " trachea midline, no adenopathy;     thyroid:  no enlargement/tenderness/nodules; no carotid    bruit or JVD   Back:     Symmetric, no curvature, ROM normal, no CVA tenderness   Lungs:    S1-S2 regular   Chest Wall:    No tenderness or deformity    Heart:    Regular rate and rhythm, S1 and S2 normal, no murmur, rub   or gallop   Abdomen:    Soft nontender   Extremities:   Extremities normal, atraumatic, no cyanosis or edema   Pulses:   Pulses palpable in all extremities; symmetric all extremities   Skin:   Skin color normal, Skin is warm and dry,  no rashes or palpable lesions   Neurologic:  Grossly nonfocal       Data Review:    I reviewed the patient's new clinical results.  Results from last 7 days   Lab Units 04/29/23  0643 04/27/23  2101   WBC 10*3/mm3 11.21* 8.67   HEMOGLOBIN g/dL 11.2* 12.4*   PLATELETS 10*3/mm3 245 301     Results from last 7 days   Lab Units 04/29/23  0643 04/27/23  2101 04/27/23  1003   SODIUM mmol/L 141 139  --    POTASSIUM mmol/L 4.0 4.3  --    CHLORIDE mmol/L 108* 105  --    CO2 mmol/L 26.2 26.1  --    BUN mg/dL 14 17  --    CREATININE mg/dL 1.27 1.57* 1.70*   CALCIUM mg/dL 8.8 9.5  --    GLUCOSE mg/dL 112* 168*  --      Microbiology Results (last 10 days)     Procedure Component Value - Date/Time    Blood Culture - Blood, Arm, Right [[id number]]  (Abnormal) Collected: 04/28/23 2134    Lab Status: Preliminary result Specimen: Blood from Arm, Right Updated: 04/29/23 0942     Blood Culture Abnormal Stain     Gram Stain Anaerobic Bottle Gram negative bacilli            Assessment:  Active Hospital Problems    Diagnosis  POA   • **Syncope, unspecified syncope type [R55]  Yes      Resolved Hospital Problems   No resolved problems to display.         Plan:  See above  Cely Joyce MD   4/29/2023  09:58 EDT    Parts of this note may be an electronic transcription/translation of spoken language to printed text using the Dragon dictation system.

## 2023-04-29 NOTE — PROGRESS NOTES
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  Patient stated that he has been feeling dizzy lightheaded and fell on the ground but did not lose any consciousness.  Patient denies any chest pain shortness of breath palpitation abdominal pain nausea vomiting diarrhea.  Patient evaluated in ER found to be hypotensive admitted for management.  At the time of interview he is awake and alert and answers all question appropriately and give me a detailed history and daughter also contributed.  Patient has no complaint at the time of examination.     REVIEW OF SYSTEMS  Unremarkable except febrile last night     PHYSICAL EXAM   Blood pressure 145/93, pulse 54, temperature 97.9 °F (36.6 °C), temperature source Oral, resp. rate 18, height 177 cm (69.69\"), weight 83.4 kg (183 lb 13.8 oz), SpO2 96 %.    GENERAL: alert, no acute distress  SKIN: Warm, dry  HEENT:  Unremarkable  NECK:  Supple  CV: regular rhythm, regular rate  RESPIRATORY: normal effort, moving air bilaterally  ABDOMEN: soft, nontender, nondistended bowel sounds positive  MUSCULOSKELETAL: no deformity  NEURO: alert, moves all extremities, follows commands     LAB RESULTS  Lab Results (last 24 hours)     Procedure Component Value Units Date/Time    Blood Culture - Blood, Hand, Left [[id number]]  (Abnormal) Collected: 04/28/23 2134    Specimen: Blood from Hand, Left Updated: 04/29/23 1421     Blood Culture Abnormal Stain     Gram Stain Aerobic Bottle Gram negative bacilli    Urine Culture - Urine, Urine, Clean Catch [[id number]]  (Abnormal) Collected: 04/28/23 2310    " Specimen: Urine, Clean Catch Updated: 04/29/23 1338     Urine Culture >100,000 CFU/mL Gram Negative Bacilli    Narrative:      Colonization of the urinary tract without infection is common. Treatment is discouraged unless the patient is symptomatic, pregnant, or undergoing an invasive urologic procedure.      PSA DIAGNOSTIC [[id number]]  (Normal) Collected: 04/29/23 0803    Specimen: Blood Updated: 04/29/23 1337     PSA 0.998 ng/mL     Narrative:      Results may be falsely decreased if patient taking Biotin.    Testing Method: [employer] Electrochemiluminescence Immunoassay(ECLIA)  Values obtained with different assay methods or kits cannot be used interchangeably.    Blood Culture ID, PCR - Blood, Arm, Right [[id number]]  (Abnormal) Collected: 04/28/23 2134    Specimen: Blood from Arm, Right Updated: 04/29/23 1137     BCID, PCR Escherichia coli. Identification by BCID2 PCR.     BOTTLE TYPE Anaerobic Bottle    Narrative:      No resistance genes detected.    High Sensitivity Troponin T 2Hr [[id number]]  (Abnormal) Collected: 04/29/23 0803    Specimen: Blood Updated: 04/29/23 0949     HS Troponin T 45 ng/L      Troponin T Delta 2 ng/L     Narrative:      High Sensitive Troponin T Reference Range:  <10.0 ng/L- Negative Female for AMI  <15.0 ng/L- Negative Male for AMI  >=10 - Abnormal Female indicating possible myocardial injury.  >=15 - Abnormal Male indicating possible myocardial injury.   Clinicians would have to utilize clinical acumen, EKG, Troponin, and serial changes to determine if it is an Acute Myocardial Infarction or myocardial injury due to an underlying chronic condition.         Blood Culture - Blood, Arm, Right [[id number]]  (Abnormal) Collected: 04/28/23 2134    Specimen: Blood from Arm, Right Updated: 04/29/23 0942     Blood Culture Abnormal Stain     Gram Stain Anaerobic Bottle Gram negative bacilli    TSH [[id number]]  (Normal) Collected: 04/29/23 0643    Specimen: Blood Updated: 04/29/23 0728      TSH 2.960 uIU/mL     High Sensitivity Troponin T [[id number]]  (Abnormal) Collected: 04/29/23 0643    Specimen: Blood Updated: 04/29/23 0728     HS Troponin T 43 ng/L     Narrative:      High Sensitive Troponin T Reference Range:  <10.0 ng/L- Negative Female for AMI  <15.0 ng/L- Negative Male for AMI  >=10 - Abnormal Female indicating possible myocardial injury.  >=15 - Abnormal Male indicating possible myocardial injury.   Clinicians would have to utilize clinical acumen, EKG, Troponin, and serial changes to determine if it is an Acute Myocardial Infarction or myocardial injury due to an underlying chronic condition.         Lipid Panel [[id number]] Collected: 04/29/23 0643    Specimen: Blood Updated: 04/29/23 0722     Total Cholesterol 120 mg/dL      Triglycerides 53 mg/dL      HDL Cholesterol 42 mg/dL      LDL Cholesterol  66 mg/dL      VLDL Cholesterol 12 mg/dL      LDL/HDL Ratio 1.60    Narrative:      Cholesterol Reference Ranges  (U.S. Department of Health and Human Services ATP III Classifications)    Desirable          <200 mg/dL  Borderline High    200-239 mg/dL  High Risk          >240 mg/dL      Triglyceride Reference Ranges  (U.S. Department of Health and Human Services ATP III Classifications)    Normal           <150 mg/dL  Borderline High  150-199 mg/dL  High             200-499 mg/dL  Very High        >500 mg/dL    HDL Reference Ranges  (U.S. Department of Health and Human Services ATP III Classifications)    Low     <40 mg/dl (major risk factor for CHD)  High    >60 mg/dl ('negative' risk factor for CHD)        LDL Reference Ranges  (U.S. Department of Health and Human Services ATP III Classifications)    Optimal          <100 mg/dL  Near Optimal     100-129 mg/dL  Borderline High  130-159 mg/dL  High             160-189 mg/dL  Very High        >189 mg/dL    Comprehensive Metabolic Panel [[id number]]  (Abnormal) Collected: 04/29/23 0643    Specimen: Blood Updated: 04/29/23 0722     Glucose 112  mg/dL      BUN 14 mg/dL      Creatinine 1.27 mg/dL      Sodium 141 mmol/L      Potassium 4.0 mmol/L      Chloride 108 mmol/L      CO2 26.2 mmol/L      Calcium 8.8 mg/dL      Total Protein 6.0 g/dL      Albumin 3.1 g/dL      ALT (SGPT) 9 U/L      AST (SGOT) 10 U/L      Alkaline Phosphatase 66 U/L      Total Bilirubin 1.1 mg/dL      Globulin 2.9 gm/dL      A/G Ratio 1.1 g/dL      BUN/Creatinine Ratio 11.0     Anion Gap 6.8 mmol/L      eGFR 58.9 mL/min/1.73     Narrative:      GFR Normal >60  Chronic Kidney Disease <60  Kidney Failure <15    The GFR formula is only valid for adults with stable renal function between ages 18 and 70.    Hemoglobin A1c [[id number]]  (Abnormal) Collected: 04/29/23 0643    Specimen: Blood Updated: 04/29/23 0714     Hemoglobin A1C 5.90 %     Narrative:      Hemoglobin A1C Ranges:    Increased Risk for Diabetes  5.7% to 6.4%  Diabetes                     >= 6.5%  Diabetic Goal                < 7.0%    CBC & Differential [[id number]]  (Abnormal) Collected: 04/29/23 0643    Specimen: Blood Updated: 04/29/23 0705    Narrative:      The following orders were created for panel order CBC & Differential.  Procedure                               Abnormality         Status                     ---------                               -----------         ------                     CBC Auto Differential[[id number]]        Abnormal            Final result                 Please view results for these tests on the individual orders.    CBC Auto Differential [[id number]]  (Abnormal) Collected: 04/29/23 0643    Specimen: Blood Updated: 04/29/23 0705     WBC 11.21 10*3/mm3      RBC 4.36 10*6/mm3      Hemoglobin 11.2 g/dL      Hematocrit 34.5 %      MCV 79.1 fL      MCH 25.7 pg      MCHC 32.5 g/dL      RDW 13.7 %      RDW-SD 39.3 fl      MPV 9.6 fL      Platelets 245 10*3/mm3      Neutrophil % 77.6 %      Lymphocyte % 12.9 %      Monocyte % 8.4 %      Eosinophil % 0.3 %      Basophil % 0.4 %      Immature Grans %  0.4 %      Neutrophils, Absolute 8.71 10*3/mm3      Lymphocytes, Absolute 1.45 10*3/mm3      Monocytes, Absolute 0.94 10*3/mm3      Eosinophils, Absolute 0.03 10*3/mm3      Basophils, Absolute 0.04 10*3/mm3      Immature Grans, Absolute 0.04 10*3/mm3      nRBC 0.0 /100 WBC     Urinalysis, Microscopic Only - Urine, Clean Catch [[id number]]  (Abnormal) Collected: 04/28/23 2310    Specimen: Urine, Clean Catch Updated: 04/29/23 0000     RBC, UA 6-12 /HPF      WBC, UA Too Numerous to Count /HPF      Bacteria, UA Trace /HPF      Squamous Epithelial Cells, UA 3-6 /HPF      Hyaline Casts, UA None Seen /LPF      Methodology Manual Light Microscopy    Urinalysis With Culture If Indicated - Urine, Clean Catch [[id number]]  (Abnormal) Collected: 04/28/23 2310    Specimen: Urine, Clean Catch Updated: 04/29/23 0000     Color, UA Yellow     Appearance, UA Cloudy     pH, UA 5.5     Specific Gravity, UA 1.011     Glucose, UA Negative     Ketones, UA Negative     Bilirubin, UA Negative     Blood, UA Moderate (2+)     Protein, UA Trace     Leuk Esterase, UA Moderate (2+)     Nitrite, UA Positive     Urobilinogen, UA 0.2 E.U./dL    Narrative:      In absence of clinical symptoms, the presence of pyuria, bacteria, and/or nitrites on the urinalysis result does not correlate with infection.        Imaging Results (Last 24 Hours)     ** No results found for the last 24 hours. **        ECG 12 Lead       Component  Ref Range & Units 1 d ago   QT Interval  ms 318    Resulting Agency  ECG             HEART RATE= 56  bpm  RR Interval= 1071  ms  KS Interval= 184  ms  P Horizontal Axis= 16  deg  P Front Axis= -19  deg  QRSD Interval= 109  ms  QT Interval= 318  ms  QRS Axis= -18  deg  T Wave Axis= -25  deg  - ABNORMAL ECG -  Sinus rhythm  Atrial premature complexes  Borderline left axis deviation  Borderline low voltage, extremity leads  PACs are new               Current Facility-Administered Medications:   •  donepezil (ARICEPT) tablet 5 mg, 5  mg, Oral, Daily, Ken Brothers MD, 5 mg at 04/29/23 1050  •  meclizine (ANTIVERT) tablet 25 mg, 25 mg, Oral, TID PRN, Ken Brothers MD  •  midodrine (PROAMATINE) tablet 5 mg, 5 mg, Oral, TID AC, Carrie Castañeda MD, 5 mg at 04/29/23 1050  •  pantoprazole (PROTONIX) EC tablet 40 mg, 40 mg, Oral, Q AM, Ken Brothers MD, 40 mg at 04/29/23 0637  •  Pharmacy to Dose Zosyn, , Does not apply, Continuous PRN, Ken Brothers MD  •  piperacillin-tazobactam (ZOSYN) 3.375 g in iso-osmotic dextrose 50 ml (premix), 3.375 g, Intravenous, Q8H, Ken Brothers MD, 3.375 g at 04/29/23 1050  •  predniSONE (DELTASONE) tablet 1 mg, 1 mg, Oral, Daily With Breakfast, Ken Brothers MD, 1 mg at 04/29/23 1050  •  [COMPLETED] Insert Peripheral IV, , , Once **AND** sodium chloride 0.9 % flush 10 mL, 10 mL, Intravenous, PRN, Fei Clark MD  •  sodium chloride 0.9 % infusion, 125 mL/hr, Intravenous, Continuous, Ken Brothers MD, Last Rate: 125 mL/hr at 04/29/23 1050, 125 mL/hr at 04/29/23 1050     ASSESSMENT  Acute GNB UTI with sepsis  E. coli bacteremia with sepsis  Syncopal episode secondary to hypotension  Sinus bradycardia,  Severe orthostatic hypotension  Polymyalgia rheumatica  Prostate cancer  Appendiceal mucinous neoplasm s/p right hemicolectomy  Dementia  History of alcohol abuse    PLAN  CPM  IVF  IV antibiotics  Midodrine started  Cardiology consult appreciated  Infectious disease to follow patient  Continue medications  Stress ulcer DVT prophylaxis  Supportive care  PT OT  Discussed with nursing staff and family  Follow closely further recommendation current hospital course    KEN BROTHERS MD    Copied text in this note has been reviewed and is accurate as of 04/29/23

## 2023-04-29 NOTE — PLAN OF CARE
Problem: Adult Inpatient Plan of Care  Goal: Plan of Care Review  Outcome: Ongoing, Not Progressing  Flowsheets (Taken 4/29/2023 0137)  Plan of Care Reviewed With: patient  Outcome Evaluation: Patient temp elevated.  Md notified.  blood cx and UA obtained.  patient started on iv rocephin.  Goal: Patient-Specific Goal (Individualized)  Outcome: Ongoing, Not Progressing  Goal: Absence of Hospital-Acquired Illness or Injury  Outcome: Ongoing, Not Progressing  Intervention: Identify and Manage Fall Risk  Recent Flowsheet Documentation  Taken 4/29/2023 0000 by Destini Rosario RN  Safety Promotion/Fall Prevention: safety round/check completed  Taken 4/28/2023 2200 by Destini Rosario RN  Safety Promotion/Fall Prevention:   assistive device/personal items within reach   safety round/check completed  Taken 4/28/2023 2005 by Destini Rosario RN  Safety Promotion/Fall Prevention:   activity supervised   assistive device/personal items within reach   safety round/check completed  Intervention: Prevent Skin Injury  Recent Flowsheet Documentation  Taken 4/29/2023 0000 by Destini Rosario RN  Body Position: position changed independently  Taken 4/28/2023 2200 by Destini Rosario RN  Body Position:   position changed independently   supine  Taken 4/28/2023 2005 by Destini Rosario RN  Body Position:   position changed independently   side-lying   right  Intervention: Prevent and Manage VTE (Venous Thromboembolism) Risk  Recent Flowsheet Documentation  Taken 4/29/2023 0000 by Destini Rosario RN  Activity Management: ambulated to bathroom  Taken 4/28/2023 2005 by Destini Rosario RN  VTE Prevention/Management: patient refused intervention  Intervention: Prevent Infection  Recent Flowsheet Documentation  Taken 4/29/2023 0000 by Destini Rosario RN  Infection Prevention:   rest/sleep promoted   single patient room provided  Taken 4/28/2023 2200 by Destini Rosario RN  Infection Prevention:    rest/sleep promoted   single patient room provided  Taken 4/28/2023 2005 by Destini Rosario RN  Infection Prevention:   rest/sleep promoted   single patient room provided  Goal: Optimal Comfort and Wellbeing  Outcome: Ongoing, Not Progressing  Intervention: Provide Person-Centered Care  Recent Flowsheet Documentation  Taken 4/28/2023 2005 by Destini Rosario RN  Trust Relationship/Rapport: care explained  Goal: Readiness for Transition of Care  Outcome: Ongoing, Not Progressing   Goal Outcome Evaluation:  Plan of Care Reviewed With: patient           Outcome Evaluation: Patient temp elevated.  Md notified.  blood cx and UA obtained.  patient started on iv rocephin.

## 2023-04-29 NOTE — PROGRESS NOTES
[facility]  Progress note: 2023    Patient Identification:  Name:Danni Figueroa  Age:77 y.o.  Sex: male  :  1945  MRN: [id number]           CC:  Orthostatic hypotension    Interval history:  Urosepsis with E. coli.  Blood pressure improved and stable on midodrine.  Patient denies any chest pain shortness of breath.  Slightly confused    Vital Signs:   Temp:  [97.9 °F (36.6 °C)-102.4 °F (39.1 °C)] 98.2 °F (36.8 °C)  Heart Rate:  [54-76] 54  Resp:  [18] 18  BP: (108-153)/(65-99) 153/91  No intake or output data in the 24 hours ending 23 184    Physical Examination:    General Appearance No acute distress   Neck No adenopathy, supple, trachea midline, no thyromegaly, no carotid bruit, no JVD   Lungs Clear to auscultation,respirations regular, even and unlabored   Heart Regular rhythm and normal rate, normal S1 and S2, no murmur, no gallop, no rub, no click   Chest wall No abnormalities observed   Abdomen Normal bowel sounds, no masses, no hepatomegaly, soft   Extremities Moves all extremities well, no edema, no cyanosis, no redness   Neurological Alert and oriented x 3     Lab Review:  Personally reviewed the labs, radiology imaging and other cardiac procedures. Results from last 7 days   Lab Units 23  0643   SODIUM mmol/L 141   POTASSIUM mmol/L 4.0   CHLORIDE mmol/L 108*   CO2 mmol/L 26.2   BUN mg/dL 14   CREATININE mg/dL 1.27   CALCIUM mg/dL 8.8   BILIRUBIN mg/dL 1.1   ALK PHOS U/L 66   ALT (SGPT) U/L 9   AST (SGOT) U/L 10   GLUCOSE mg/dL 112*     Results from last 7 days   Lab Units 23  0803 23  0643 23  2256   HSTROP T ng/L 45* 43* 33*     Results from last 7 days   Lab Units 23  0643 23  2101   WBC 10*3/mm3 11.21* 8.67   HEMOGLOBIN g/dL 11.2* 12.4*   HEMATOCRIT % 34.5* 38.6   PLATELETS 10*3/mm3 245 301     Results from last 7 days   Lab Units 23  2101   INR  1.10   APTT seconds 24.0     Medication Review:   Meds reviewed  Scheduled  Meds:donepezil, 5 mg, Oral, Daily  midodrine, 5 mg, Oral, TID AC  pantoprazole, 40 mg, Oral, Q AM  piperacillin-tazobactam, 3.375 g, Intravenous, Q8H  predniSONE, 1 mg, Oral, Daily With Breakfast      Continuous Infusions:Pharmacy to Dose Zosyn,   sodium chloride, 75 mL/hr, Last Rate: 75 mL/hr (04/29/23 1500)      I personally viewed and interpreted the patient's EKG/Telemetry data    Assessment and Plan    1.  Syncope.  Orthostatic pressures seem slightly improved.  He does have significant EKG changes in the anterolateral leads with elevated but flat troponin.  We will check an echocardiogram.  2.  Bradycardia, stable.  3.  Elevated troponin, likely due to bacteremia however EKG with ST-T wave changes.  We will check an echocardiogram.  4.  E. coli bacteremia  5.  Polymyalgia rheumatica  6.  UTI  7.  Prostate cancer  8.  Dementia      Mini [employer]  4/29/202318:41 EDT  25min spent in reviewing records, discussion and examination of the patient and discussion with other members of the patient's medical team.     Dictated utilizing Dragon dictation

## 2023-04-30 ENCOUNTER — APPOINTMENT (OUTPATIENT)
Dept: CT IMAGING | Facility: HOSPITAL | Age: 78
DRG: 872 | End: 2023-04-30
Payer: MEDICARE

## 2023-04-30 LAB
ANION GAP SERPL CALCULATED.3IONS-SCNC: 7 MMOL/L (ref 5–15)
BACTERIA SPEC AEROBE CULT: ABNORMAL
BASOPHILS # BLD AUTO: 0.04 10*3/MM3 (ref 0–0.2)
BASOPHILS NFR BLD AUTO: 0.5 % (ref 0–1.5)
BUN SERPL-MCNC: 12 MG/DL (ref 8–23)
BUN/CREAT SERPL: 10.2 (ref 7–25)
CALCIUM SPEC-SCNC: 8.3 MG/DL (ref 8.6–10.5)
CHLORIDE SERPL-SCNC: 110 MMOL/L (ref 98–107)
CO2 SERPL-SCNC: 25 MMOL/L (ref 22–29)
CREAT SERPL-MCNC: 1.18 MG/DL (ref 0.76–1.27)
DEPRECATED RDW RBC AUTO: 40.3 FL (ref 37–54)
EGFRCR SERPLBLD CKD-EPI 2021: 64.3 ML/MIN/1.73
EOSINOPHIL # BLD AUTO: 0.1 10*3/MM3 (ref 0–0.4)
EOSINOPHIL NFR BLD AUTO: 1.3 % (ref 0.3–6.2)
ERYTHROCYTE [DISTWIDTH] IN BLOOD BY AUTOMATED COUNT: 13.7 % (ref 12.3–15.4)
GLUCOSE SERPL-MCNC: 86 MG/DL (ref 65–99)
HCT VFR BLD AUTO: 35.1 % (ref 37.5–51)
HGB BLD-MCNC: 11.3 G/DL (ref 13–17.7)
IMM GRANULOCYTES # BLD AUTO: 0.03 10*3/MM3 (ref 0–0.05)
IMM GRANULOCYTES NFR BLD AUTO: 0.4 % (ref 0–0.5)
LYMPHOCYTES # BLD AUTO: 0.88 10*3/MM3 (ref 0.7–3.1)
LYMPHOCYTES NFR BLD AUTO: 11.6 % (ref 19.6–45.3)
MCH RBC QN AUTO: 26 PG (ref 26.6–33)
MCHC RBC AUTO-ENTMCNC: 32.2 G/DL (ref 31.5–35.7)
MCV RBC AUTO: 80.9 FL (ref 79–97)
MONOCYTES # BLD AUTO: 0.65 10*3/MM3 (ref 0.1–0.9)
MONOCYTES NFR BLD AUTO: 8.5 % (ref 5–12)
NEUTROPHILS NFR BLD AUTO: 5.91 10*3/MM3 (ref 1.7–7)
NEUTROPHILS NFR BLD AUTO: 77.7 % (ref 42.7–76)
NRBC BLD AUTO-RTO: 0 /100 WBC (ref 0–0.2)
PLATELET # BLD AUTO: 241 10*3/MM3 (ref 140–450)
PMV BLD AUTO: 9.8 FL (ref 6–12)
POTASSIUM SERPL-SCNC: 4 MMOL/L (ref 3.5–5.2)
QT INTERVAL: 462 MS
RBC # BLD AUTO: 4.34 10*6/MM3 (ref 4.14–5.8)
SODIUM SERPL-SCNC: 142 MMOL/L (ref 136–145)
WBC NRBC COR # BLD: 7.61 10*3/MM3 (ref 3.4–10.8)

## 2023-04-30 PROCEDURE — 74176 CT ABD & PELVIS W/O CONTRAST: CPT

## 2023-04-30 PROCEDURE — 99232 SBSQ HOSP IP/OBS MODERATE 35: CPT | Performed by: NURSE PRACTITIONER

## 2023-04-30 PROCEDURE — 87040 BLOOD CULTURE FOR BACTERIA: CPT | Performed by: INTERNAL MEDICINE

## 2023-04-30 PROCEDURE — 80048 BASIC METABOLIC PNL TOTAL CA: CPT | Performed by: HOSPITALIST

## 2023-04-30 PROCEDURE — 25010000002 PIPERACILLIN SOD-TAZOBACTAM PER 1 G: Performed by: HOSPITALIST

## 2023-04-30 PROCEDURE — 63710000001 PREDNISONE PER 5 MG: Performed by: HOSPITALIST

## 2023-04-30 PROCEDURE — 85025 COMPLETE CBC W/AUTO DIFF WBC: CPT | Performed by: HOSPITALIST

## 2023-04-30 RX ADMIN — DONEPEZIL HYDROCHLORIDE 5 MG: 5 TABLET, FILM COATED ORAL at 08:53

## 2023-04-30 RX ADMIN — MIDODRINE HYDROCHLORIDE 5 MG: 5 TABLET ORAL at 11:32

## 2023-04-30 RX ADMIN — TAZOBACTAM SODIUM AND PIPERACILLIN SODIUM 3.38 G: 375; 3 INJECTION, SOLUTION INTRAVENOUS at 05:31

## 2023-04-30 RX ADMIN — TAZOBACTAM SODIUM AND PIPERACILLIN SODIUM 3.38 G: 375; 3 INJECTION, SOLUTION INTRAVENOUS at 21:18

## 2023-04-30 RX ADMIN — SODIUM CHLORIDE 50 ML/HR: 9 INJECTION, SOLUTION INTRAVENOUS at 16:25

## 2023-04-30 RX ADMIN — MIDODRINE HYDROCHLORIDE 5 MG: 5 TABLET ORAL at 17:52

## 2023-04-30 RX ADMIN — MIDODRINE HYDROCHLORIDE 5 MG: 5 TABLET ORAL at 08:53

## 2023-04-30 RX ADMIN — PANTOPRAZOLE SODIUM 40 MG: 40 TABLET, DELAYED RELEASE ORAL at 08:53

## 2023-04-30 RX ADMIN — PREDNISONE 1 MG: 1 TABLET ORAL at 08:53

## 2023-04-30 RX ADMIN — TAZOBACTAM SODIUM AND PIPERACILLIN SODIUM 3.38 G: 375; 3 INJECTION, SOLUTION INTRAVENOUS at 11:32

## 2023-04-30 NOTE — PLAN OF CARE
Goal Outcome Evaluation:              Outcome Evaluation: Alert and oriented. Orthostatic b/p obtained. Denies dizziness with movement or position change. No complaints.

## 2023-04-30 NOTE — PROGRESS NOTES
[facility]  Cardiology Progress Note    Patient Identification:  Name: Danni Figueroa  Age: 77 y.o.  Sex: male  :  1945  MRN: [id number]                 Follow Up / Chief Complaint: Follow-up for orthostatic hypotension    Interval History: Blood pressure stable on midodrine.  Denies any shortness of breath or chest pain     Objective:    Past Medical History:  Past Medical History:   Diagnosis Date   • Diabetes mellitus    • History of alcohol abuse    • Hypertension    • Low grade mucinous neoplasm of appendix    • Peripheral vertigo    • Prostate carcinoma      Past Surgical History:  Past Surgical History:   Procedure Laterality Date   • APPENDECTOMY     • COLON SURGERY  2014    Right hemicolectomy-Pericecal mass   • COLONOSCOPY     • PROSTATE SURGERY          Social History:   Social History     Tobacco Use   • Smoking status: Former     Packs/day: 1.00     Years: 10.00     Pack years: 10.00     Types: Cigarettes   • Smokeless tobacco: Never   • Tobacco comments:     caffeine use   Substance Use Topics   • Alcohol use: No     Comment: 31 year former alcoholic       Family History:  Family History   Problem Relation Age of Onset   • Prostate cancer Brother    • Heart failure Brother    • Diabetes Other    • Heart failure Brother    • Heart failure Son           Allergies:  No Known Allergies  Scheduled Meds:  donepezil, 5 mg, Daily  midodrine, 5 mg, TID AC  pantoprazole, 40 mg, Q AM  piperacillin-tazobactam, 3.375 g, Q8H  predniSONE, 1 mg, Daily With Breakfast            INTAKE AND OUTPUT:    Intake/Output Summary (Last 24 hours) at 2023 1403  Last data filed at 2023 0439  Gross per 24 hour   Intake --   Output 400 ml   Net -400 ml         Constitutional:  Temp:  [98.1 °F (36.7 °C)-98.9 °F (37.2 °C)] 98.1 °F (36.7 °C)  Heart Rate:  [54-74] 62  Resp:  [16-18] 16  BP: ()/(72-91) 112/77      Physical Exam:  General:  Appears in no acute distress, resting in  bed  Eyes: eom normal no conjunctival drainage  HEENT:  No JVD. Thyroid not visibly enlarged. No mucosal pallor or cyanosis  Respiratory: Respirations regular and unlabored at rest. BBS with good air entry in all fields. No crackles, rubs or wheezes auscultated  Cardiovascular: S1S2 Regular rate and rhythm. No murmur, rub or gallop. No carotid bruits. DP/PT pulses     . No pretibial pitting edema  Gastrointestinal: Abdomen soft, flat, non tender. Bowel sounds present. No hepatosplenomegaly. No ascites  Skin:   Skin warm and dry to touch. No rashes    Neuro: AAO x3 CN II-XII grossly intact  Psych: Mood and affect normal, pleasant and cooperative          I reviewed the patient's new clinical results, and personally reviewed and interpreted the patient's ECG and telemetry data from the last 24 hours    Lab Review   Results from last 7 days   Lab Units 04/29/23  0803 04/29/23  0643 04/27/23  2256   HSTROP T ng/L 45* 43* 33*     Results from last 7 days   Lab Units 04/27/23  2101   MAGNESIUM mg/dL 2.3     Results from last 7 days   Lab Units 04/30/23  0814   SODIUM mmol/L 142   POTASSIUM mmol/L 4.0   BUN mg/dL 12   CREATININE mg/dL 1.18   CALCIUM mg/dL 8.3*     @[id number]@  Results from last 7 days   Lab Units 04/30/23  0814 04/29/23  0643 04/27/23  2101   WBC 10*3/mm3 7.61 11.21* 8.67   HEMOGLOBIN g/dL 11.3* 11.2* 12.4*   HEMATOCRIT % 35.1* 34.5* 38.6   PLATELETS 10*3/mm3 241 245 301     Results from last 7 days   Lab Units 04/27/23  2101   INR  1.10   APTT seconds 24.0         Assessment/plan:  1.  Syncope due to orthostatic pressures.  Blood pressure has improved now on midodrine.  2.  EKG changes: In the anterolateral leads with elevated flat troponin's.  He denies any chest pain.  3.  Bradycardia, stable.  He states he is an avid runner prior to this episode  4.  Elevated troponin: Likely due to bacteremia.  EKG changes.  Echo pending.  5.  E. coli bacteremia: ID following  6.  Polymyalgia rheumatic  7.  UTI  8.  " History of prostate cancer  9.  Dementia    Blood pressure and heart rate stable.  Continue midodrine.  Further recommendations once echo has been completed and read    [facility] will resume care on Monday morning.      )4/30/2023  SCOT Lei/Transcription:   \"Dictated utilizing Dragon dictation\".     "

## 2023-04-30 NOTE — PAYOR COMM NOTE
"Danni Stephenson \"SERAFIN\" (77 y.o. Male)     ATTN: NURSE REVIEWER  RE: INITIAL INPT AUTH CLINICALS  AUTH: [id number]  PLEASE REPLY TO SONIYA KIRAN [phone] OR FAX# [phone]    Date of Birth   1945    Social Security Number       Address   [address] [place] [address]    Home Phone   [phone]    MRN   [id number]       Yarsanism   Religious    Marital Status                               Admission Date   4/27/23    Admission Type   Emergency    Admitting Provider   Derrick Brothers MD    Attending Provider   Derrick Brothers MD    Department, Room/Bed   [address], 77/1       Discharge Date       Discharge Disposition       Discharge Destination                               Attending Provider: Derrick Brothers MD    Allergies: No Known Allergies    Isolation: None   Infection: None   Code Status: CPR    Ht: 177 cm (69.69\")   Wt: 83.4 kg (183 lb 13.8 oz)    Admission Cmt: None   Principal Problem: Syncope, unspecified syncope type [R55]                 Active Insurance as of 4/27/2023     Primary Coverage     Payor Plan Insurance Group Employer/Plan Group    ANTHEM MEDICARE REPLACEMENT ANTHEM MEDICARE ADVANTAGE [id number]     Payor Plan Address Payor Plan Phone Number Payor Plan Fax Number Effective Dates    [address] [phone]  1/1/2016 - None Entered    [facility] [id number]       Subscriber Name Subscriber Birth Date Member ID       DANNI STEPHENSON 1945 [id number]                 Emergency Contacts      (Rel.) Home Phone Work Phone Mobile Phone    Paula Stephenson (Spouse) [phone] -- --               History & Physical      Derrick Brothers MD at 04/28/23 0924          History and physical    Primary care physician      Chief complaint  Syncope    History of present illness  77-year-old -American male with history of severe orthostatic hypotension polymyalgia rheumatica prostate cancer and 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  Patient stated that he has been feeling dizzy lightheaded and fell on the ground but did not lose any consciousness.  Patient denies any chest pain shortness of breath palpitation abdominal pain nausea vomiting diarrhea.  Patient evaluated in ER found to be hypotensive admitted for management.  At the time of interview he is awake and alert and answers all question appropriately and give me a detailed history and daughter also contributed.  Patient has no complaint at the time of examination.    PAST MEDICAL HISTORY  • Diabetes mellitus     • History of alcohol abuse     • Hypertension     • Peripheral vertigo     • Prostate carcinoma        PAST SURGICAL HISTORY              Procedure Laterality Date   • APPENDECTOMY   1993   • COLON SURGERY   06/2014     Right hemicolectomy-Pericecal mass   • COLONOSCOPY       • PROSTATE SURGERY   2014         FAMILY HISTORY           Problem Relation Age of Onset   • Prostate cancer Brother     • Heart failure Brother     • Diabetes Other     • Heart failure Brother     • Heart failure Son        SOCIAL HISTORY                 Socioeconomic History   • Marital status:        Spouse name: Paula   • Years of education: College   Tobacco Use   • Smoking status: Former       Packs/day: 1.00       Years: 10.00       Pack years: 10.00       Types: Cigarettes   • Smokeless tobacco: Never   • Tobacco comments:       caffeine use   Vaping Use   • Vaping Use: Never used   Substance and Sexual Activity   • Alcohol use: No       Comment: 29 year former alcoholic    • Drug use: No   • Sexual activity: Defer         ALLERGIES  Patient has no known allergies.  Home medications reviewed      REVIEW OF SYSTEMS  All systems reviewed and negative except for those discussed in HPI.      PHYSICAL EXAM   Blood pressure (!) 77/58, pulse 83, temperature 98.4 °F (36.9 °C),  "temperature source Oral, resp. rate 16, height 177 cm (69.69\"), weight 83.4 kg (183 lb 13.8 oz), SpO2 97 %.    GENERAL: alert, no acute distress  SKIN: Warm, dry  HEENT:  Unremarkable  NECK:  Supple  CV: regular rhythm, regular rate  RESPIRATORY: normal effort, moving air bilaterally  ABDOMEN: soft, nontender, nondistended bowel sounds positive  MUSCULOSKELETAL: no deformity  NEURO: alert, moves all extremities, follows commands     LAB RESULTS  Lab Results (last 24 hours)     Procedure Component Value Units Date/Time    High Sensitivity Troponin T 2Hr [[id number]]  (Abnormal) Collected: 04/27/23 2256    Specimen: Blood Updated: 04/27/23 2338     HS Troponin T 33 ng/L      Troponin T Delta 7 ng/L     Narrative:      High Sensitive Troponin T Reference Range:  <10.0 ng/L- Negative Female for AMI  <15.0 ng/L- Negative Male for AMI  >=10 - Abnormal Female indicating possible myocardial injury.  >=15 - Abnormal Male indicating possible myocardial injury.   Clinicians would have to utilize clinical acumen, EKG, Troponin, and serial changes to determine if it is an Acute Myocardial Infarction or myocardial injury due to an underlying chronic condition.         BNP [[id number]]  (Normal) Collected: 04/27/23 2101    Specimen: Blood Updated: 04/27/23 2225     proBNP 171.0 pg/mL     Narrative:      Among patients with dyspnea, NT-proBNP is highly sensitive for the detection of acute congestive heart failure. In addition NT-proBNP of <300 pg/ml effectively rules out acute congestive heart failure with 99% negative predictive value.    Results may be falsely decreased if patient taking Biotin.      High Sensitivity Troponin T [[id number]]  (Abnormal) Collected: 04/27/23 2101    Specimen: Blood Updated: 04/27/23 2225     HS Troponin T 26 ng/L     Narrative:      High Sensitive Troponin T Reference Range:  <10.0 ng/L- Negative Female for AMI  <15.0 ng/L- Negative Male for AMI  >=10 - Abnormal Female indicating possible myocardial " injury.  >=15 - Abnormal Male indicating possible myocardial injury.   Clinicians would have to utilize clinical acumen, EKG, Troponin, and serial changes to determine if it is an Acute Myocardial Infarction or myocardial injury due to an underlying chronic condition.         Comprehensive Metabolic Panel [[id number]]  (Abnormal) Collected: 04/27/23 2101    Specimen: Blood Updated: 04/27/23 2146     Glucose 168 mg/dL      BUN 17 mg/dL      Creatinine 1.57 mg/dL      Sodium 139 mmol/L      Potassium 4.3 mmol/L      Comment: Slight hemolysis detected by analyzer. Results may be affected.        Chloride 105 mmol/L      CO2 26.1 mmol/L      Calcium 9.5 mg/dL      Total Protein 6.7 g/dL      Albumin 3.7 g/dL      ALT (SGPT) 12 U/L      AST (SGOT) 15 U/L      Comment: Slight hemolysis detected by analyzer. Results may be affected.        Alkaline Phosphatase 84 U/L      Total Bilirubin <0.2 mg/dL      Globulin 3.0 gm/dL      A/G Ratio 1.2 g/dL      BUN/Creatinine Ratio 10.8     Anion Gap 7.9 mmol/L      eGFR 45.7 mL/min/1.73     Narrative:      GFR Normal >60  Chronic Kidney Disease <60  Kidney Failure <15    The GFR formula is only valid for adults with stable renal function between ages 18 and 70.    Magnesium [[id number]]  (Normal) Collected: 04/27/23 2101    Specimen: Blood Updated: 04/27/23 2146     Magnesium 2.3 mg/dL     aPTT [[id number]]  (Normal) Collected: 04/27/23 2101    Specimen: Blood Updated: 04/27/23 2130     PTT 24.0 seconds     Protime-INR [[id number]]  (Abnormal) Collected: 04/27/23 2101    Specimen: Blood Updated: 04/27/23 2130     Protime 14.3 Seconds      INR 1.10    CBC & Differential [[id number]]  (Abnormal) Collected: 04/27/23 2101    Specimen: Blood Updated: 04/27/23 2118    Narrative:      The following orders were created for panel order CBC & Differential.  Procedure                               Abnormality         Status                     ---------                               -----------          ------                     CBC Auto Differential[[id number]]        Abnormal            Final result                 Please view results for these tests on the individual orders.    CBC Auto Differential [[id number]]  (Abnormal) Collected: 04/27/23 2101    Specimen: Blood Updated: 04/27/23 2118     WBC 8.67 10*3/mm3      RBC 4.77 10*6/mm3      Hemoglobin 12.4 g/dL      Hematocrit 38.6 %      MCV 80.9 fL      MCH 26.0 pg      MCHC 32.1 g/dL      RDW 13.7 %      RDW-SD 39.2 fl      MPV 9.7 fL      Platelets 301 10*3/mm3      Neutrophil % 74.1 %      Lymphocyte % 17.4 %      Monocyte % 6.1 %      Eosinophil % 1.3 %      Basophil % 0.5 %      Immature Grans % 0.6 %      Neutrophils, Absolute 6.43 10*3/mm3      Lymphocytes, Absolute 1.51 10*3/mm3      Monocytes, Absolute 0.53 10*3/mm3      Eosinophils, Absolute 0.11 10*3/mm3      Basophils, Absolute 0.04 10*3/mm3      Immature Grans, Absolute 0.05 10*3/mm3      nRBC 0.1 /100 WBC         Imaging Results (Last 24 Hours)     Procedure Component Value Units Date/Time    XR Chest 1 View [[id number]] Collected: 04/27/23 2128     Updated: 04/27/23 2132    Narrative:      XR CHEST 1 VW-     Clinical: Shortness of breath     COMPARISON CT chest 12/22/2014     FINDINGS: Similar to the previous CT examination there is elevation of  the right hemidiaphragm. Cardiac size upper limits of normal. No pleural  effusion, vascular congestion nor acute airspace disease is  demonstrated.     CONCLUSION: No acute pulmonary process is demonstrated, elevated right  hemidiaphragm similar to 2014.     This report was finalized on 4/27/2023 9:29 PM by Dr. Ismael Patino M.D.           ECG 12 Lead       Component  Ref Range & Units 1 d ago   QT Interval  ms 318    Resulting Agency  ECG             HEART RATE= 56  bpm  RR Interval= 1071  ms  PA Interval= 184  ms  P Horizontal Axis= 16  deg  P Front Axis= -19  deg  QRSD Interval= 109  ms  QT Interval= 318  ms  QRS Axis= -18  deg  T Wave Axis= -25   deg  - ABNORMAL ECG -  Sinus rhythm  Atrial premature complexes  Borderline left axis deviation  Borderline low voltage, extremity leads  PACs are new               Current Facility-Administered Medications:   •  donepezil (ARICEPT) tablet 5 mg, 5 mg, Oral, Daily, Ken Brothers MD  •  fludrocortisone tablet 100 mcg, 100 mcg, Oral, Daily, Ken Brothers MD, 100 mcg at 04/28/23 0917  •  meclizine (ANTIVERT) tablet 25 mg, 25 mg, Oral, TID PRN, Ken Brothers MD  •  pantoprazole (PROTONIX) EC tablet 40 mg, 40 mg, Oral, Q AM, Ken Brotehrs MD, 40 mg at 04/28/23 0621  •  predniSONE (DELTASONE) tablet 1 mg, 1 mg, Oral, Daily With Breakfast, Ken Brothers MD, 1 mg at 04/28/23 0917  •  [COMPLETED] Insert Peripheral IV, , , Once **AND** sodium chloride 0.9 % flush 10 mL, 10 mL, Intravenous, PRN, Fei Clark MD  •  sodium chloride 0.9 % infusion, 125 mL/hr, Intravenous, Continuous, Ken Brothers MD, Last Rate: 125 mL/hr at 04/28/23 1030, 125 mL/hr at 04/28/23 1030     ASSESSMENT  Syncopal episode secondary to hypotension  Sinus bradycardia,  Severe orthostatic hypotension  Polymyalgia rheumatica  Prostate cancer  Appendiceal mucinous neoplasm s/p right hemicolectomy  Dementia  History of alcohol abuse    PLAN  Admit  IVF  Monitor  Midodrine if needed  Cardiology consult  Continue medications  Stress ulcer DVT prophylaxis  Supportive care  PT OT  Patient is full code  Discussed with nursing staff and family  Follow closely further recommendation current hospital course    KEN BROTHERS MD    Electronically signed by Ken Brothers MD at 04/28/23 1232         Facility-Administered Medications as of 4/30/2023   Medication Dose Route Frequency Provider Last Rate Last Admin   • donepezil (ARICEPT) tablet 5 mg  5 mg Oral Daily Ken Brothers MD   5 mg at 04/30/23 0853   • [COMPLETED] gadobenate dimeglumine (MULTIHANCE) injection 20 mL  20 mL Intravenous Once in imaging Ya Stallings MD   20 mL at 04/27/23 1028   •  meclizine (ANTIVERT) tablet 25 mg  25 mg Oral TID PRN Derrick Brothers MD       • midodrine (PROAMATINE) tablet 5 mg  5 mg Oral TID AC Carrie Castañeda MD   5 mg at 04/30/23 1132   • pantoprazole (PROTONIX) EC tablet 40 mg  40 mg Oral Q AM Derrick Brothers MD   40 mg at 04/30/23 0853   • Pharmacy to Dose Zosyn   Does not apply Continuous PRN Derrick Brothers MD       • [COMPLETED] piperacillin-tazobactam (ZOSYN) 3.375 g in iso-osmotic dextrose 50 ml (premix)  3.375 g Intravenous Once Derrick Brothers MD   3.375 g at 04/28/23 2229   • piperacillin-tazobactam (ZOSYN) 3.375 g in iso-osmotic dextrose 50 ml (premix)  3.375 g Intravenous Q8H Derrick Brothers MD 0 mL/hr at 04/29/23 2230 3.375 g at 04/30/23 1132   • predniSONE (DELTASONE) tablet 1 mg  1 mg Oral Daily With Breakfast Derrick Brothers MD   1 mg at 04/30/23 0853   • sodium chloride 0.9 % flush 10 mL  10 mL Intravenous PRN Fei Clark MD       • sodium chloride 0.9 % infusion  75 mL/hr Intravenous Continuous Derrick Brothers MD 75 mL/hr at 04/30/23 0638 75 mL/hr at 04/30/23 0638     Lab Results (last 48 hours)     Procedure Component Value Units Date/Time    Basic Metabolic Panel [[id number]]  (Abnormal) Collected: 04/30/23 0814    Specimen: Blood Updated: 04/30/23 0914     Glucose 86 mg/dL      BUN 12 mg/dL      Creatinine 1.18 mg/dL      Sodium 142 mmol/L      Potassium 4.0 mmol/L      Chloride 110 mmol/L      CO2 25.0 mmol/L      Calcium 8.3 mg/dL      BUN/Creatinine Ratio 10.2     Anion Gap 7.0 mmol/L      eGFR 64.3 mL/min/1.73     Narrative:      GFR Normal >60  Chronic Kidney Disease <60  Kidney Failure <15    The GFR formula is only valid for adults with stable renal function between ages 18 and 70.    CBC & Differential [[id number]]  (Abnormal) Collected: 04/30/23 0814    Specimen: Blood Updated: 04/30/23 0854    Narrative:      The following orders were created for panel order CBC & Differential.  Procedure                               Abnormality         Status                      ---------                               -----------         ------                     CBC Auto Differential[[id number]]        Abnormal            Final result                 Please view results for these tests on the individual orders.    CBC Auto Differential [[id number]]  (Abnormal) Collected: 04/30/23 0814    Specimen: Blood Updated: 04/30/23 0854     WBC 7.61 10*3/mm3      RBC 4.34 10*6/mm3      Hemoglobin 11.3 g/dL      Hematocrit 35.1 %      MCV 80.9 fL      MCH 26.0 pg      MCHC 32.2 g/dL      RDW 13.7 %      RDW-SD 40.3 fl      MPV 9.8 fL      Platelets 241 10*3/mm3      Neutrophil % 77.7 %      Lymphocyte % 11.6 %      Monocyte % 8.5 %      Eosinophil % 1.3 %      Basophil % 0.5 %      Immature Grans % 0.4 %      Neutrophils, Absolute 5.91 10*3/mm3      Lymphocytes, Absolute 0.88 10*3/mm3      Monocytes, Absolute 0.65 10*3/mm3      Eosinophils, Absolute 0.10 10*3/mm3      Basophils, Absolute 0.04 10*3/mm3      Immature Grans, Absolute 0.03 10*3/mm3      nRBC 0.0 /100 WBC     Blood Culture - Blood, Hand, Left [[id number]]  (Abnormal) Collected: 04/28/23 2134    Specimen: Blood from Hand, Left Updated: 04/30/23 0633     Blood Culture Escherichia coli     Isolated from Aerobic Bottle     Gram Stain Aerobic Bottle Gram negative bacilli    Blood Culture - Blood, Arm, Right [[id number]]  (Abnormal) Collected: 04/28/23 2134    Specimen: Blood from Arm, Right Updated: 04/30/23 0632     Blood Culture Escherichia coli     Isolated from Aerobic and Anaerobic Bottles     Gram Stain Anaerobic Bottle Gram negative bacilli      Aerobic Bottle Gram negative bacilli    Urine Culture - Urine, Urine, Clean Catch [[id number]]  (Abnormal)  (Susceptibility) Collected: 04/28/23 2310    Specimen: Urine, Clean Catch Updated: 04/30/23 0014     Urine Culture >100,000 CFU/mL Escherichia coli    Narrative:      Colonization of the urinary tract without infection is common. Treatment is discouraged unless the patient is  symptomatic, pregnant, or undergoing an invasive urologic procedure.      Susceptibility      Escherichia coli      LEA      Ampicillin Resistant     Ampicillin + Sulbactam Intermediate      Cefazolin Susceptible      Cefepime Susceptible      Ceftazidime Susceptible      Ceftriaxone Susceptible      Gentamicin Susceptible      Levofloxacin Susceptible      Nitrofurantoin Susceptible      Piperacillin + Tazobactam Susceptible      Trimethoprim + Sulfamethoxazole Resistant                          PSA DIAGNOSTIC [[id number]]  (Normal) Collected: 04/29/23 0803    Specimen: Blood Updated: 04/29/23 1337     PSA 0.998 ng/mL     Narrative:      Results may be falsely decreased if patient taking Biotin.    Testing Method: Roche [employer] Electrochemiluminescence Immunoassay(ECLIA)  Values obtained with different assay methods or kits cannot be used interchangeably.    Blood Culture ID, PCR - Blood, Arm, Right [[id number]]  (Abnormal) Collected: 04/28/23 2134    Specimen: Blood from Arm, Right Updated: 04/29/23 1137     BCID, PCR Escherichia coli. Identification by BCID2 PCR.     BOTTLE TYPE Anaerobic Bottle    Narrative:      No resistance genes detected.    High Sensitivity Troponin T 2Hr [[id number]]  (Abnormal) Collected: 04/29/23 0803    Specimen: Blood Updated: 04/29/23 0949     HS Troponin T 45 ng/L      Troponin T Delta 2 ng/L     Narrative:      High Sensitive Troponin T Reference Range:  <10.0 ng/L- Negative Female for AMI  <15.0 ng/L- Negative Male for AMI  >=10 - Abnormal Female indicating possible myocardial injury.  >=15 - Abnormal Male indicating possible myocardial injury.   Clinicians would have to utilize clinical acumen, EKG, Troponin, and serial changes to determine if it is an Acute Myocardial Infarction or myocardial injury due to an underlying chronic condition.         TSH [[id number]]  (Normal) Collected: 04/29/23 0643    Specimen: Blood Updated: 04/29/23 0728     TSH 2.960 uIU/mL     High Sensitivity  Troponin T [[id number]]  (Abnormal) Collected: 04/29/23 0643    Specimen: Blood Updated: 04/29/23 0728     HS Troponin T 43 ng/L     Narrative:      High Sensitive Troponin T Reference Range:  <10.0 ng/L- Negative Female for AMI  <15.0 ng/L- Negative Male for AMI  >=10 - Abnormal Female indicating possible myocardial injury.  >=15 - Abnormal Male indicating possible myocardial injury.   Clinicians would have to utilize clinical acumen, EKG, Troponin, and serial changes to determine if it is an Acute Myocardial Infarction or myocardial injury due to an underlying chronic condition.         Lipid Panel [[id number]] Collected: 04/29/23 0643    Specimen: Blood Updated: 04/29/23 0722     Total Cholesterol 120 mg/dL      Triglycerides 53 mg/dL      HDL Cholesterol 42 mg/dL      LDL Cholesterol  66 mg/dL      VLDL Cholesterol 12 mg/dL      LDL/HDL Ratio 1.60    Narrative:      Cholesterol Reference Ranges  (U.S. Department of Health and Human Services ATP III Classifications)    Desirable          <200 mg/dL  Borderline High    200-239 mg/dL  High Risk          >240 mg/dL      Triglyceride Reference Ranges  (U.S. Department of Health and Human Services ATP III Classifications)    Normal           <150 mg/dL  Borderline High  150-199 mg/dL  High             200-499 mg/dL  Very High        >500 mg/dL    HDL Reference Ranges  (U.S. Department of Health and Human Services ATP III Classifications)    Low     <40 mg/dl (major risk factor for CHD)  High    >60 mg/dl ('negative' risk factor for CHD)        LDL Reference Ranges  (U.S. Department of Health and Human Services ATP III Classifications)    Optimal          <100 mg/dL  Near Optimal     100-129 mg/dL  Borderline High  130-159 mg/dL  High             160-189 mg/dL  Very High        >189 mg/dL    Comprehensive Metabolic Panel [[id number]]  (Abnormal) Collected: 04/29/23 0643    Specimen: Blood Updated: 04/29/23 0722     Glucose 112 mg/dL      BUN 14 mg/dL      Creatinine 1.27  mg/dL      Sodium 141 mmol/L      Potassium 4.0 mmol/L      Chloride 108 mmol/L      CO2 26.2 mmol/L      Calcium 8.8 mg/dL      Total Protein 6.0 g/dL      Albumin 3.1 g/dL      ALT (SGPT) 9 U/L      AST (SGOT) 10 U/L      Alkaline Phosphatase 66 U/L      Total Bilirubin 1.1 mg/dL      Globulin 2.9 gm/dL      A/G Ratio 1.1 g/dL      BUN/Creatinine Ratio 11.0     Anion Gap 6.8 mmol/L      eGFR 58.9 mL/min/1.73     Narrative:      GFR Normal >60  Chronic Kidney Disease <60  Kidney Failure <15    The GFR formula is only valid for adults with stable renal function between ages 18 and 70.    Hemoglobin A1c [[id number]]  (Abnormal) Collected: 04/29/23 0643    Specimen: Blood Updated: 04/29/23 0714     Hemoglobin A1C 5.90 %     Narrative:      Hemoglobin A1C Ranges:    Increased Risk for Diabetes  5.7% to 6.4%  Diabetes                     >= 6.5%  Diabetic Goal                < 7.0%    CBC & Differential [[id number]]  (Abnormal) Collected: 04/29/23 0643    Specimen: Blood Updated: 04/29/23 0705    Narrative:      The following orders were created for panel order CBC & Differential.  Procedure                               Abnormality         Status                     ---------                               -----------         ------                     CBC Auto Differential[[id number]]        Abnormal            Final result                 Please view results for these tests on the individual orders.    CBC Auto Differential [[id number]]  (Abnormal) Collected: 04/29/23 0643    Specimen: Blood Updated: 04/29/23 0705     WBC 11.21 10*3/mm3      RBC 4.36 10*6/mm3      Hemoglobin 11.2 g/dL      Hematocrit 34.5 %      MCV 79.1 fL      MCH 25.7 pg      MCHC 32.5 g/dL      RDW 13.7 %      RDW-SD 39.3 fl      MPV 9.6 fL      Platelets 245 10*3/mm3      Neutrophil % 77.6 %      Lymphocyte % 12.9 %      Monocyte % 8.4 %      Eosinophil % 0.3 %      Basophil % 0.4 %      Immature Grans % 0.4 %      Neutrophils, Absolute 8.71 10*3/mm3       Lymphocytes, Absolute 1.45 10*3/mm3      Monocytes, Absolute 0.94 10*3/mm3      Eosinophils, Absolute 0.03 10*3/mm3      Basophils, Absolute 0.04 10*3/mm3      Immature Grans, Absolute 0.04 10*3/mm3      nRBC 0.0 /100 WBC     Urinalysis, Microscopic Only - Urine, Clean Catch [[id number]]  (Abnormal) Collected: 04/28/23 2310    Specimen: Urine, Clean Catch Updated: 04/29/23 0000     RBC, UA 6-12 /HPF      WBC, UA Too Numerous to Count /HPF      Bacteria, UA Trace /HPF      Squamous Epithelial Cells, UA 3-6 /HPF      Hyaline Casts, UA None Seen /LPF      Methodology Manual Light Microscopy    Urinalysis With Culture If Indicated - Urine, Clean Catch [[id number]]  (Abnormal) Collected: 04/28/23 2310    Specimen: Urine, Clean Catch Updated: 04/29/23 0000     Color, UA Yellow     Appearance, UA Cloudy     pH, UA 5.5     Specific Gravity, UA 1.011     Glucose, UA Negative     Ketones, UA Negative     Bilirubin, UA Negative     Blood, UA Moderate (2+)     Protein, UA Trace     Leuk Esterase, UA Moderate (2+)     Nitrite, UA Positive     Urobilinogen, UA 0.2 E.U./dL    Narrative:      In absence of clinical symptoms, the presence of pyuria, bacteria, and/or nitrites on the urinalysis result does not correlate with infection.          Imaging Results (Last 48 Hours)     ** No results found for the last 48 hours. **        ECG/EMG Results (last 48 hours)     Procedure Component Value Units Date/Time    ECG 12 Lead Other; t-wave inversion [[id number]] Collected: 04/29/23 0259     Updated: 04/29/23 0300     QT Interval 462 ms     Narrative:      HEART RATE= 58  bpm  RR Interval= 1034  ms  OK Interval= 181  ms  P Horizontal Axis= 36  deg  P Front Axis= 26  deg  QRSD Interval= 108  ms  QT Interval= 462  ms  QRS Axis= -24  deg  T Wave Axis= -56  deg  - ABNORMAL ECG -  Sinus rhythm  Atrial premature complexes  Borderline left axis deviation  Borderline low voltage, extremity leads  Nonspecific T abnrm, anterolateral  leads  Electronically Signed By:   Date and Time of Study: 2023-04-29 02:59:36    SCANNED - TELEMETRY   [[id number]] Resulted: 04/27/23     Updated: 04/29/23 0643    SCANNED - TELEMETRY   [[id number]] Resulted: 04/27/23     Updated: 04/29/23 0752    SCANNED - TELEMETRY   [[id number]] Resulted: 04/27/23     Updated: 04/29/23 0752          Orders (last 48 hrs)      Start     Ordered    04/30/23 1408  Blood Culture - Blood,  Once        See Hyperspace for full Linked Orders Report.    04/30/23 1407    04/30/23 1408  Blood Culture - Blood,  Once        Comments: From a different site than #1.     See Hyperspace for full Linked Orders Report.    04/30/23 1407    04/30/23 1238  Inpatient Admission  Once         04/30/23 1237    04/30/23 0600  Creatinine Serum (kidney function) GFR component  Morning Draw,   Status:  Canceled        Comments: Please cancel if BMP/CMP/Renal function panel are scheduled with AM labs      04/29/23 0917    04/30/23 0600  CBC & Differential  Morning Draw         04/29/23 1500    04/30/23 0600  Basic Metabolic Panel  Morning Draw         04/29/23 1500    04/30/23 0600  CBC Auto Differential  PROCEDURE ONCE         04/29/23 2203    04/29/23 1847  Adult Transthoracic Echo Complete W/ Cont if Necessary Per Protocol  Once         04/29/23 1846    04/29/23 1600  piperacillin-tazobactam (ZOSYN) 3.375 g in iso-osmotic dextrose 50 ml (premix)  Once,   Status:  Discontinued         04/29/23 1500    04/29/23 1234  Bladder Scan  Once         04/29/23 1233    04/29/23 1233  PSA DIAGNOSTIC  Once         04/29/23 1233    04/29/23 1233  CT Abdomen Pelvis Without Contrast  1 Time Imaging         04/29/23 1233    04/29/23 0943  Blood Culture ID, PCR - Blood, Arm, Right  Once         04/29/23 0942    04/29/23 0843  High Sensitivity Troponin T 2Hr  PROCEDURE ONCE         04/29/23 0728    04/29/23 0800  High Sensitivity Troponin T  Once         04/29/23 0317    04/29/23 0600  CBC & Differential  Morning Draw          04/28/23 1027    04/29/23 0600  Comprehensive Metabolic Panel  Morning Draw         04/28/23 1027    04/29/23 0600  TSH  Morning Draw         04/28/23 1027    04/29/23 0600  Lipid Panel  Morning Draw         04/28/23 1027    04/29/23 0600  Hemoglobin A1c  Morning Draw         04/28/23 1027    04/29/23 0600  CBC Auto Differential  PROCEDURE ONCE         04/28/23 2203 04/29/23 0330  piperacillin-tazobactam (ZOSYN) 3.375 g in iso-osmotic dextrose 50 ml (premix)  Every 8 Hours         04/28/23 2031 04/29/23 0244  ECG 12 Lead Other; t-wave inversion  STAT         04/29/23 0245    04/29/23 0001  Urine Culture - Urine, Urine, Clean Catch  Once         04/29/23 0000    04/28/23 2338  Urinalysis, Microscopic Only - Urine, Clean Catch  Once         04/28/23 2337 04/28/23 2130  piperacillin-tazobactam (ZOSYN) 3.375 g in iso-osmotic dextrose 50 ml (premix)  Once         04/28/23 2031 04/28/23 2112  Blood Culture - Blood, Hand, Left  Once        See Hyperspace for full Linked Orders Report.    04/28/23 2113 04/28/23 2112  Blood Culture - Blood, Arm, Right  Once        See Hyperspace for full Linked Orders Report.    04/28/23 2113 04/28/23 2024  Urinalysis With Culture If Indicated - Urine, Clean Catch  Once         04/28/23 2023 04/28/23 2022  Inpatient Infectious Diseases Consult  Once        Specialty:  Infectious Diseases  Provider:  Cely Joyce MD    04/28/23 2022 04/28/23 2021  Pharmacy to Dose Zosyn  Continuous PRN         04/28/23 2022 04/28/23 2021  Blood Culture - Blood,  Once,   Status:  Canceled        See Hyperspace for full Linked Orders Report.    04/28/23 2020 04/28/23 2021  Blood Culture - Blood, Blood, Central Line  Once,   Status:  Canceled        See Hyperspace for full Linked Orders Report.    04/28/23 2020 04/28/23 1730  midodrine (PROAMATINE) tablet 2.5 mg  3 Times Daily Before Meals,   Status:  Discontinued         04/28/23 1420    04/28/23 1730  midodrine (PROAMATINE)  "tablet 5 mg  3 Times Daily Before Meals         23 1422    23 1607  PT Plan of Care Cert / Re-Cert  Once        Comments: Physical Therapy Plan of Care  Initial Certification  Certification Period: 2023 - 2023    Patient Name: Danni Figueroa  : 1945    (R55) Syncope, unspecified syncope type  (primary encounter diagnosis)                  Assessment  PT Assessment  Criteria for Skilled Interventions Met (PT): (P) no                    Plan  PT Plan  Outcome Evaluation: (P) Pt is a 78 y/o male admitted to [facility] following a syncopal episode at home and N/V. Pt has Hx of OH and prostate ca. Pt A+Ox3 this PM for evaluation w/ spouse present in room. Pt IND w/ ADLs and mobility at baseline w/ no AD. Pt reports walking 3-4 miles every day, and states that he felt fine up until the last second when the snycope \"came out of nowhere.\" Today pt came to EOB sba, STS sba, and amb 75' sba + no AD. Orthostatics assessed: sitting 130/82, standing 1 min 114/77, standing 3 min 104/86. Pt had no c/o dizziness, light head, etc. Acute PT services not indicated at this time. No concerns for return to home once cleared from a medical standpoint.        Amber Narvaez, PT  2023            By cosigning this order, either electronically or physically, I certify that the therapy services are furnished while this patient is under my care, the services outline above are required by this patient, and will be reviewed every 90 days.        M.D.:__________________________________________ Date: ______________    23 1606    23 1539  OT Plan of Care Cert / Re-Cert  Once        Comments: Occupational Therapy Plan of Care  Initial Certification  Certification Period: 2023 - 2023    Patient Name: Danni Figueroa  : 1945    (R55) Syncope, unspecified syncope type  (primary encounter diagnosis)                Assessment  OT Assessment  Criteria for Skilled Therapeutic Interventions " Met (OT): no problems identified which require skilled intervention         OT Rehab Goals     Row Name 04/28/23 1537             Transfer Goal 1 (OT)    Activity/Assistive Device (Transfer Goal 1, OT) sit-to-stand/stand-to-sit    -MW      [place] Level/Cues Needed (Transfer Goal 1, OT) standby assist  -MW        Time Frame (Transfer Goal 1, OT) short term goal (STG);1 day  -MW      Progress/Outcome (Transfer Goal 1, OT) goal met  -MW            User Key  (r) = Recorded By, (t) = Taken By, (c) = Cosigned By    Initials Name Provider Type Discipline    Dalia Cameron OT Occupational Therapist OT                    Plan    OT Plan  Outcome Evaluation: Pt is a 78 yo male admitted from home with c/o syncopal episode and N/V. Hx severe OH, prostate cancer. Pt seen for OT eval, A&Ox3, (I) at baseline with ADLs and mobility. Pt moving well this date, SBA for bed mob, STS and mob with SBA and no AD, no concerns for ADL task completion. Pt with orthostatics assessed. Sitting 130/82, standing 1 min 114/77, standing 3 mins 104/86. Pt with no reports of dizziness, educated on no acute OT needs during stay, will sign off. Family and RN aware.      Dalia Christianson OT  4/28/2023        By cosigning this order, either electronically or physically, I certify that the therapy services are furnished while this patient is under my care, the services outline above are required by this patient, and will be reviewed every 90 days.        M.D.:__________________________________________ Date: ______________    04/28/23 1538    04/28/23 1115  donepezil (ARICEPT) tablet 5 mg  Daily         04/28/23 1026    04/28/23 1026  meclizine (ANTIVERT) tablet 25 mg  3 Times Daily PRN         04/28/23 1026    04/28/23 0800  Neuro Checks  Every 4 Hours       04/28/23 0432    04/28/23 0800  predniSONE (DELTASONE) tablet 1 mg  Daily With Breakfast         04/28/23 0435    04/28/23 0600  pantoprazole (PROTONIX) EC tablet 40 mg  Every Early  Morning         23 0435    23 0515  sodium chloride 0.9 % infusion  Continuous         23 04223  sodium chloride 0.9 % flush 10 mL  As Needed        See Hyperspace for full Linked Orders Report.    23 0000  meclizine (ANTIVERT) 25 MG tablet  3 Times Daily PRN         23 0135    --  SCANNED - TELEMETRY           23 0000    --  SCANNED - TELEMETRY           23 0000    --  SCANNED - TELEMETRY           23 0000    --  SCANNED - TELEMETRY           23 0000              Operative/Procedure Notes (last 48 hours)  Notes from 23 1426 through 23 1426   No notes of this type exist for this encounter.          Physician Progress Notes (last 48 hours)      Arlyn Zaragoza APRN at 23 1403          [facility]  Cardiology Progress Note    Patient Identification:  Name: Danni Figueroa  Age: 77 y.o.  Sex: male  :  1945  MRN: [id number]                 Follow Up / Chief Complaint: Follow-up for orthostatic hypotension    Interval History: Blood pressure stable on midodrine.  Denies any shortness of breath or chest pain     Objective:    Past Medical History:  Past Medical History:   Diagnosis Date   • Diabetes mellitus    • History of alcohol abuse    • Hypertension    • Low grade mucinous neoplasm of appendix    • Peripheral vertigo    • Prostate carcinoma      Past Surgical History:  Past Surgical History:   Procedure Laterality Date   • APPENDECTOMY     • COLON SURGERY  2014    Right hemicolectomy-Pericecal mass   • COLONOSCOPY     • PROSTATE SURGERY          Social History:   Social History     Tobacco Use   • Smoking status: Former     Packs/day: 1.00     Years: 10.00     Pack years: 10.00     Types: Cigarettes   • Smokeless tobacco: Never   • Tobacco comments:     caffeine use   Substance Use Topics   • Alcohol use: No     Comment: 29 year former alcoholic       Family  History:  Family History   Problem Relation Age of Onset   • Prostate cancer Brother    • Heart failure Brother    • Diabetes Other    • Heart failure Brother    • Heart failure Son           Allergies:  No Known Allergies  Scheduled Meds:  donepezil, 5 mg, Daily  midodrine, 5 mg, TID AC  pantoprazole, 40 mg, Q AM  piperacillin-tazobactam, 3.375 g, Q8H  predniSONE, 1 mg, Daily With Breakfast            INTAKE AND OUTPUT:    Intake/Output Summary (Last 24 hours) at 4/30/2023 1403  Last data filed at 4/30/2023 0439  Gross per 24 hour   Intake --   Output 400 ml   Net -400 ml         Constitutional:  Temp:  [98.1 °F (36.7 °C)-98.9 °F (37.2 °C)] 98.1 °F (36.7 °C)  Heart Rate:  [54-74] 62  Resp:  [16-18] 16  BP: ()/(72-91) 112/77      Physical Exam:  General:  Appears in no acute distress, resting in bed  Eyes: eom normal no conjunctival drainage  HEENT:  No JVD. Thyroid not visibly enlarged. No mucosal pallor or cyanosis  Respiratory: Respirations regular and unlabored at rest. BBS with good air entry in all fields. No crackles, rubs or wheezes auscultated  Cardiovascular: S1S2 Regular rate and rhythm. No murmur, rub or gallop. No carotid bruits. DP/PT pulses     . No pretibial pitting edema  Gastrointestinal: Abdomen soft, flat, non tender. Bowel sounds present. No hepatosplenomegaly. No ascites  Skin:   Skin warm and dry to touch. No rashes    Neuro: AAO x3 CN II-XII grossly intact  Psych: Mood and affect normal, pleasant and cooperative          I reviewed the patient's new clinical results, and personally reviewed and interpreted the patient's ECG and telemetry data from the last 24 hours    Lab Review   Results from last 7 days   Lab Units 04/29/23  0803 04/29/23  0643 04/27/23  2256   HSTROP T ng/L 45* 43* 33*     Results from last 7 days   Lab Units 04/27/23  2101   MAGNESIUM mg/dL 2.3     Results from last 7 days   Lab Units 04/30/23  0814   SODIUM mmol/L 142   POTASSIUM mmol/L 4.0   BUN mg/dL 12  "  CREATININE mg/dL 1.18   CALCIUM mg/dL 8.3*     @[id number]@  Results from last 7 days   Lab Units 23  0814 23  0643 23  2101   WBC 10*3/mm3 7.61 11.21* 8.67   HEMOGLOBIN g/dL 11.3* 11.2* 12.4*   HEMATOCRIT % 35.1* 34.5* 38.6   PLATELETS 10*3/mm3 241 245 301     Results from last 7 days   Lab Units 23  2101   INR  1.10   APTT seconds 24.0         Assessment/plan:  1.  Syncope due to orthostatic pressures.  Blood pressure has improved now on midodrine.  2.  EKG changes: In the anterolateral leads with elevated flat troponin's.  He denies any chest pain.  3.  Bradycardia, stable.  He states he is an avid runner prior to this episode  4.  Elevated troponin: Likely due to bacteremia.  EKG changes.  Echo pending.  5.  E. coli bacteremia: ID following  6.  Polymyalgia rheumatic  7.  UTI  8.  History of prostate cancer  9.  Dementia    Blood pressure and heart rate stable.  Continue midodrine.  Further recommendations once echo has been completed and read    [facility] will resume care on Monday morning.      )2023  SCOT Lei      EMR Dynamic Recreation/Transcription:   \"Dictated utilizing Dragon dictation\".       Electronically signed by Arlyn Zaragoza APRN at 23 1425     Eloisa Pro MD at 23 1841          [facility]  Progress note: 2023    Patient Identification:  Name:Danni Figueroa  Age:77 y.o.  Sex: male  :  1945  MRN: [id number]           CC:  Orthostatic hypotension    Interval history:  Urosepsis with E. coli.  Blood pressure improved and stable on midodrine.  Patient denies any chest pain shortness of breath.  Slightly confused    Vital Signs:   Temp:  [97.9 °F (36.6 °C)-102.4 °F (39.1 °C)] 98.2 °F (36.8 °C)  Heart Rate:  [54-76] 54  Resp:  [18] 18  BP: (108-153)/(65-99) 153/91  No intake or output data in the 24 hours ending 23 1841    Physical Examination:    General Appearance No acute distress   Neck No " adenopathy, supple, trachea midline, no thyromegaly, no carotid bruit, no JVD   Lungs Clear to auscultation,respirations regular, even and unlabored   Heart Regular rhythm and normal rate, normal S1 and S2, no murmur, no gallop, no rub, no click   Chest wall No abnormalities observed   Abdomen Normal bowel sounds, no masses, no hepatomegaly, soft   Extremities Moves all extremities well, no edema, no cyanosis, no redness   Neurological Alert and oriented x 3     Lab Review:  Personally reviewed the labs, radiology imaging and other cardiac procedures. Results from last 7 days   Lab Units 04/29/23  0643   SODIUM mmol/L 141   POTASSIUM mmol/L 4.0   CHLORIDE mmol/L 108*   CO2 mmol/L 26.2   BUN mg/dL 14   CREATININE mg/dL 1.27   CALCIUM mg/dL 8.8   BILIRUBIN mg/dL 1.1   ALK PHOS U/L 66   ALT (SGPT) U/L 9   AST (SGOT) U/L 10   GLUCOSE mg/dL 112*     Results from last 7 days   Lab Units 04/29/23  0803 04/29/23  0643 04/27/23  2256   HSTROP T ng/L 45* 43* 33*     Results from last 7 days   Lab Units 04/29/23  0643 04/27/23  2101   WBC 10*3/mm3 11.21* 8.67   HEMOGLOBIN g/dL 11.2* 12.4*   HEMATOCRIT % 34.5* 38.6   PLATELETS 10*3/mm3 245 301     Results from last 7 days   Lab Units 04/27/23  2101   INR  1.10   APTT seconds 24.0     Medication Review:   Meds reviewed  Scheduled Meds:donepezil, 5 mg, Oral, Daily  midodrine, 5 mg, Oral, TID AC  pantoprazole, 40 mg, Oral, Q AM  piperacillin-tazobactam, 3.375 g, Intravenous, Q8H  predniSONE, 1 mg, Oral, Daily With Breakfast      Continuous Infusions:Pharmacy to Dose Zosyn,   sodium chloride, 75 mL/hr, Last Rate: 75 mL/hr (04/29/23 1500)      I personally viewed and interpreted the patient's EKG/Telemetry data    Assessment and Plan    1.  Syncope.  Orthostatic pressures seem slightly improved.  He does have significant EKG changes in the anterolateral leads with elevated but flat troponin.  We will check an echocardiogram.  2.  Bradycardia, stable.  3.  Elevated troponin, likely  "due to bacteremia however EKG with ST-T wave changes.  We will check an echocardiogram.  4.  E. coli bacteremia  5.  Polymyalgia rheumatica  6.  UTI  7.  Prostate cancer  8.  Dementia      Eloisa Pro  4/29/202318:41 EDT  25min spent in reviewing records, discussion and examination of the patient and discussion with other members of the patient's medical team.     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  Patient stated that he has been feeling dizzy lightheaded and fell on the ground but did not lose any consciousness.  Patient denies any chest pain shortness of breath palpitation abdominal pain nausea vomiting diarrhea.  Patient evaluated in ER found to be hypotensive admitted for management.  At the time of interview he is awake and alert and answers all question appropriately and give me a detailed history and daughter also contributed.  Patient has no complaint at the time of examination.     REVIEW OF SYSTEMS  Unremarkable except febrile last night     PHYSICAL EXAM   Blood pressure 145/93, pulse 54, temperature 97.9 °F (36.6 °C), temperature source Oral, resp. rate 18, height 177 cm (69.69\"), weight 83.4 kg (183 lb 13.8 oz), SpO2 96 %.    GENERAL: alert, no acute distress  SKIN: Warm, dry  HEENT:  Unremarkable  NECK:  Supple  CV: regular rhythm, regular rate  RESPIRATORY: normal effort, moving air bilaterally  ABDOMEN: soft, nontender, nondistended bowel sounds " positive  MUSCULOSKELETAL: no deformity  NEURO: alert, moves all extremities, follows commands     LAB RESULTS  Lab Results (last 24 hours)     Procedure Component Value Units Date/Time    Blood Culture - Blood, Hand, Left [[id number]]  (Abnormal) Collected: 04/28/23 2134    Specimen: Blood from Hand, Left Updated: 04/29/23 1421     Blood Culture Abnormal Stain     Gram Stain Aerobic Bottle Gram negative bacilli    Urine Culture - Urine, Urine, Clean Catch [[id number]]  (Abnormal) Collected: 04/28/23 2310    Specimen: Urine, Clean Catch Updated: 04/29/23 1338     Urine Culture >100,000 CFU/mL Gram Negative Bacilli    Narrative:      Colonization of the urinary tract without infection is common. Treatment is discouraged unless the patient is symptomatic, pregnant, or undergoing an invasive urologic procedure.      PSA DIAGNOSTIC [[id number]]  (Normal) Collected: 04/29/23 0803    Specimen: Blood Updated: 04/29/23 1337     PSA 0.998 ng/mL     Narrative:      Results may be falsely decreased if patient taking Biotin.    Testing Method: [employer] Electrochemiluminescence Immunoassay(ECLIA)  Values obtained with different assay methods or kits cannot be used interchangeably.    Blood Culture ID, PCR - Blood, Arm, Right [[id number]]  (Abnormal) Collected: 04/28/23 2134    Specimen: Blood from Arm, Right Updated: 04/29/23 1137     BCID, PCR Escherichia coli. Identification by BCID2 PCR.     BOTTLE TYPE Anaerobic Bottle    Narrative:      No resistance genes detected.    High Sensitivity Troponin T 2Hr [[id number]]  (Abnormal) Collected: 04/29/23 0803    Specimen: Blood Updated: 04/29/23 0949     HS Troponin T 45 ng/L      Troponin T Delta 2 ng/L     Narrative:      High Sensitive Troponin T Reference Range:  <10.0 ng/L- Negative Female for AMI  <15.0 ng/L- Negative Male for AMI  >=10 - Abnormal Female indicating possible myocardial injury.  >=15 - Abnormal Male indicating possible myocardial injury.   Clinicians would  have to utilize clinical acumen, EKG, Troponin, and serial changes to determine if it is an Acute Myocardial Infarction or myocardial injury due to an underlying chronic condition.         Blood Culture - Blood, Arm, Right [[id number]]  (Abnormal) Collected: 04/28/23 2134    Specimen: Blood from Arm, Right Updated: 04/29/23 0942     Blood Culture Abnormal Stain     Gram Stain Anaerobic Bottle Gram negative bacilli    TSH [[id number]]  (Normal) Collected: 04/29/23 0643    Specimen: Blood Updated: 04/29/23 0728     TSH 2.960 uIU/mL     High Sensitivity Troponin T [[id number]]  (Abnormal) Collected: 04/29/23 0643    Specimen: Blood Updated: 04/29/23 0728     HS Troponin T 43 ng/L     Narrative:      High Sensitive Troponin T Reference Range:  <10.0 ng/L- Negative Female for AMI  <15.0 ng/L- Negative Male for AMI  >=10 - Abnormal Female indicating possible myocardial injury.  >=15 - Abnormal Male indicating possible myocardial injury.   Clinicians would have to utilize clinical acumen, EKG, Troponin, and serial changes to determine if it is an Acute Myocardial Infarction or myocardial injury due to an underlying chronic condition.         Lipid Panel [[id number]] Collected: 04/29/23 0643    Specimen: Blood Updated: 04/29/23 0722     Total Cholesterol 120 mg/dL      Triglycerides 53 mg/dL      HDL Cholesterol 42 mg/dL      LDL Cholesterol  66 mg/dL      VLDL Cholesterol 12 mg/dL      LDL/HDL Ratio 1.60    Narrative:      Cholesterol Reference Ranges  (U.S. Department of Health and Human Services ATP III Classifications)    Desirable          <200 mg/dL  Borderline High    200-239 mg/dL  High Risk          >240 mg/dL      Triglyceride Reference Ranges  (U.S. Department of Health and Human Services ATP III Classifications)    Normal           <150 mg/dL  Borderline High  150-199 mg/dL  High             200-499 mg/dL  Very High        >500 mg/dL    HDL Reference Ranges  (U.S. Department of Health and Human Services ATP III  Classifications)    Low     <40 mg/dl (major risk factor for CHD)  High    >60 mg/dl ('negative' risk factor for CHD)        LDL Reference Ranges  (U.S. Department of Health and Human Services ATP III Classifications)    Optimal          <100 mg/dL  Near Optimal     100-129 mg/dL  Borderline High  130-159 mg/dL  High             160-189 mg/dL  Very High        >189 mg/dL    Comprehensive Metabolic Panel [[id number]]  (Abnormal) Collected: 04/29/23 0643    Specimen: Blood Updated: 04/29/23 0722     Glucose 112 mg/dL      BUN 14 mg/dL      Creatinine 1.27 mg/dL      Sodium 141 mmol/L      Potassium 4.0 mmol/L      Chloride 108 mmol/L      CO2 26.2 mmol/L      Calcium 8.8 mg/dL      Total Protein 6.0 g/dL      Albumin 3.1 g/dL      ALT (SGPT) 9 U/L      AST (SGOT) 10 U/L      Alkaline Phosphatase 66 U/L      Total Bilirubin 1.1 mg/dL      Globulin 2.9 gm/dL      A/G Ratio 1.1 g/dL      BUN/Creatinine Ratio 11.0     Anion Gap 6.8 mmol/L      eGFR 58.9 mL/min/1.73     Narrative:      GFR Normal >60  Chronic Kidney Disease <60  Kidney Failure <15    The GFR formula is only valid for adults with stable renal function between ages 18 and 70.    Hemoglobin A1c [[id number]]  (Abnormal) Collected: 04/29/23 0643    Specimen: Blood Updated: 04/29/23 0714     Hemoglobin A1C 5.90 %     Narrative:      Hemoglobin A1C Ranges:    Increased Risk for Diabetes  5.7% to 6.4%  Diabetes                     >= 6.5%  Diabetic Goal                < 7.0%    CBC & Differential [[id number]]  (Abnormal) Collected: 04/29/23 0643    Specimen: Blood Updated: 04/29/23 0705    Narrative:      The following orders were created for panel order CBC & Differential.  Procedure                               Abnormality         Status                     ---------                               -----------         ------                     CBC Auto Differential[[id number]]        Abnormal            Final result                 Please view results for these  tests on the individual orders.    CBC Auto Differential [[id number]]  (Abnormal) Collected: 04/29/23 0643    Specimen: Blood Updated: 04/29/23 0705     WBC 11.21 10*3/mm3      RBC 4.36 10*6/mm3      Hemoglobin 11.2 g/dL      Hematocrit 34.5 %      MCV 79.1 fL      MCH 25.7 pg      MCHC 32.5 g/dL      RDW 13.7 %      RDW-SD 39.3 fl      MPV 9.6 fL      Platelets 245 10*3/mm3      Neutrophil % 77.6 %      Lymphocyte % 12.9 %      Monocyte % 8.4 %      Eosinophil % 0.3 %      Basophil % 0.4 %      Immature Grans % 0.4 %      Neutrophils, Absolute 8.71 10*3/mm3      Lymphocytes, Absolute 1.45 10*3/mm3      Monocytes, Absolute 0.94 10*3/mm3      Eosinophils, Absolute 0.03 10*3/mm3      Basophils, Absolute 0.04 10*3/mm3      Immature Grans, Absolute 0.04 10*3/mm3      nRBC 0.0 /100 WBC     Urinalysis, Microscopic Only - Urine, Clean Catch [[id number]]  (Abnormal) Collected: 04/28/23 2310    Specimen: Urine, Clean Catch Updated: 04/29/23 0000     RBC, UA 6-12 /HPF      WBC, UA Too Numerous to Count /HPF      Bacteria, UA Trace /HPF      Squamous Epithelial Cells, UA 3-6 /HPF      Hyaline Casts, UA None Seen /LPF      Methodology Manual Light Microscopy    Urinalysis With Culture If Indicated - Urine, Clean Catch [[id number]]  (Abnormal) Collected: 04/28/23 2310    Specimen: Urine, Clean Catch Updated: 04/29/23 0000     Color, UA Yellow     Appearance, UA Cloudy     pH, UA 5.5     Specific Gravity, UA 1.011     Glucose, UA Negative     Ketones, UA Negative     Bilirubin, UA Negative     Blood, UA Moderate (2+)     Protein, UA Trace     Leuk Esterase, UA Moderate (2+)     Nitrite, UA Positive     Urobilinogen, UA 0.2 E.U./dL    Narrative:      In absence of clinical symptoms, the presence of pyuria, bacteria, and/or nitrites on the urinalysis result does not correlate with infection.        Imaging Results (Last 24 Hours)     ** No results found for the last 24 hours. **        ECG 12 Lead       Component  Ref Range & Units 1  d ago   QT Interval  ms 318    Resulting Agency  [facility]             HEART RATE= 56  bpm  RR Interval= 1071  ms  AK Interval= 184  ms  P Horizontal Axis= 16  deg  P Front Axis= -19  deg  QRSD Interval= 109  ms  QT Interval= 318  ms  QRS Axis= -18  deg  T Wave Axis= -25  deg  - ABNORMAL ECG -  Sinus rhythm  Atrial premature complexes  Borderline left axis deviation  Borderline low voltage, extremity leads  PACs are new               Current Facility-Administered Medications:   •  donepezil (ARICEPT) tablet 5 mg, 5 mg, Oral, Daily, Derrick Brothers MD, 5 mg at 04/29/23 1050  •  meclizine (ANTIVERT) tablet 25 mg, 25 mg, Oral, TID PRN, Derrick Brothers MD  •  midodrine (PROAMATINE) tablet 5 mg, 5 mg, Oral, TID AC, Carrie Castañeda MD, 5 mg at 04/29/23 1050  •  pantoprazole (PROTONIX) EC tablet 40 mg, 40 mg, Oral, Q AM, Derrick Brothers MD, 40 mg at 04/29/23 0637  •  Pharmacy to Dose Zosyn, , Does not apply, Continuous PRN, Derrick Brothers MD  •  piperacillin-tazobactam (ZOSYN) 3.375 g in iso-osmotic dextrose 50 ml (premix), 3.375 g, Intravenous, Q8H, Derrick Brothers MD, 3.375 g at 04/29/23 1050  •  predniSONE (DELTASONE) tablet 1 mg, 1 mg, Oral, Daily With Breakfast, Derrick Brothers MD, 1 mg at 04/29/23 1050  •  [COMPLETED] Insert Peripheral IV, , , Once **AND** sodium chloride 0.9 % flush 10 mL, 10 mL, Intravenous, PRN, Fei Clark MD  •  sodium chloride 0.9 % infusion, 125 mL/hr, Intravenous, Continuous, Derrick Brothers MD, Last Rate: 125 mL/hr at 04/29/23 1050, 125 mL/hr at 04/29/23 1050     ASSESSMENT  Acute GNB UTI with sepsis  E. coli bacteremia with sepsis  Syncopal episode secondary to hypotension  Sinus bradycardia,  Severe orthostatic hypotension  Polymyalgia rheumatica  Prostate cancer  Appendiceal mucinous neoplasm s/p right hemicolectomy  Dementia  History of alcohol abuse    PLAN  CPM  IVF  IV antibiotics  Midodrine started  Cardiology consult appreciated  Infectious disease to follow patient  Continue  medications  Stress ulcer DVT prophylaxis  Supportive care  PT OT  Discussed with nursing staff and family  Follow closely further recommendation current hospital course    KEN BROTHERS MD    Copied text in this note has been reviewed and is accurate as of 23      Electronically signed by Ken Brothers MD at 23 0082          Consult Notes (last 48 hours)      Cely Joyce MD at 23 0958      Consult Orders    1. Inpatient Infectious Diseases Consult [[id number]] ordered by Ken Brothers MD at 23               CONSULT NOTE    Infectious Diseases - Cely Marshall MD  [facility]       Patient Identification:  Name: Danni Figueroa  Age: 77 y.o.  Sex: male  :  1945  MRN: [id number]             Date of Consultation: 2023      Primary Care Physician: Ya Stallings MD                               Requesting Physician: Dr. 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  About couple of weeks ago when his wife noticed that he is not himself and little bit unsteady even though he felt otherwise fine and was able to do his routines including walking 3 miles a day.  Patient was seen by primary care provider on 2023 and because of his unsteadiness and tremors MRI of the brain was ordered.  Since patient has had a meal on the day of his visit to his primary care physician he was asked to come back for the lab work on 2023.   According to the patient's wife the lab work included urinalysis which showed evidence of urinary tract infection and patient was started on Bactrim which she started taking on 4/19/2023 and took it for 5 days.  According to the wife patient felt better with resolution of unsteadiness and improvement in appetite and in fact on 4/26/2023 he was able to walk 3 miles and felt good.  On 4/27/2023 patient went out to get his scheduled MRI of the brain which was performed here to evaluate for unsteadiness and tremors and and later in the day when he came in after MRI he felt weak and tired and laid down.  Later that evening patient went out for Bible study and was with other people standing in the kitchen suddenly felt that he is going to fall and the eased him up on the floor.  Patient did have associated nausea and vomiting.  Patient did lose consciousness but regained it quickly.  Evaluation in the emergency room included CBC which was unremarkable except for mild anemia, CMP which showed blood sugar of 168 and creatinine of 1.57 elevated troponin with a T delta of 7.  Last night patient spiked a temperature to 102.4 and blood cultures were drawn and patient was started IV Zosyn empirically afterwards.  Urinalysis and urine cultures were sent.  Blood culture earlier this morning come back positive for E. coli.  Patient is feeling better.  Patient denies any neck pain back pain hip pain shoulder pain knee pain flank pain.  Because of the positive blood culture infectious disease service is consulted.  This presentation in the above context and sequence of events leading to this hospitalization and fever last night with positive blood culture is concerning for:  1-systemic E. coli bacteremic sepsis likely secondary to  2-urinary tract infection with systemic hypertension and orthostatic syncopal episode  3-rule out intra-abdominal obstructive  process  4-diabetes mellitus diet-controlled  5-history of neoplasm of  appendix status post right hemicolectomy  6-history of prostate cancer  7-recent diagnosis of UTI and treatment with Bactrim  8-recent MRI of the brain on 4/27/2023 results not available.    Recommendations/Discussions:  At this juncture I agree with the care plan consisting of IV Zosyn while awaiting the final sensitivity of the E. coli.  De-escalate antibiotic therapy based on the clinical course and sensitivity data.  The real issue is to figure out the source of bacteremia and it could very well be complicated urinary tract infection with infectious syndrome could range from complicated cystitis versus acute on chronic prostatitis versus obstructive uropathy and pyelonephritis.  Patient at present does not have any localizing symptoms at this time.  Other intra-abdominal process such as intra-abdominal abscess of bowel region given the history of right hemicolectomy for mucinous adenocarcinoma as well as biliary process need to be considered.  Recommend repeating blood culture on 4/29/2023 and based on the results of above studies and work-up we will decide upon duration of antibiotic treatment as well as assessment for the need for adjunct intervention besides antibiotic therapy.  Overall concept of care discussed with patient and patient's wife at the bedside.      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  About couple of weeks ago when his wife noticed that he is not himself and little bit unsteady even  though he felt otherwise fine and was able to do his routines including walking 3 miles a day.  Patient was seen by primary care provider on 4/17/2023 and because of his unsteadiness and tremors MRI of the brain was ordered.  Since patient has had a meal on the day of his visit to his primary care physician he was asked to come back for the lab work on 4/18/2023.  According to the patient's wife the lab work included urinalysis which showed evidence of urinary tract infection and patient was started on Bactrim which she started taking on 4/19/2023 and took it for 5 days.  According to the wife patient felt better with resolution of unsteadiness and improvement in appetite and in fact on 4/26/2023 he was able to walk 3 miles and felt good.  On 4/27/2023 patient went out to get his scheduled MRI of the brain which was performed here to evaluate for unsteadiness and tremors and and later in the day when he came in after MRI he felt weak and tired and laid down.  Later that evening patient went out for Bible study and was with other people standing in the kitchen suddenly felt that he is going to fall and the eased him up on the floor.  Patient did have associated nausea and vomiting.  Patient did lose consciousness but regained it quickly.  Evaluation in the emergency room included CBC which was unremarkable except for mild anemia, CMP which showed blood sugar of 168 and creatinine of 1.57 elevated troponin with a T delta of 7.  Last night patient spiked a temperature to 102.4 and blood cultures were drawn and patient was started IV Zosyn empirically afterwards.  Urinalysis and urine cultures were sent.  Blood culture earlier this morning come back positive for E. coli.  Patient is feeling better.  Patient denies any neck pain back pain hip pain shoulder pain knee pain flank pain.  Because of the positive blood culture infectious disease service is consulted.      Past Medical History:  Past Medical History:    Diagnosis Date   • Diabetes mellitus    • History of alcohol abuse    • Hypertension    • Low grade mucinous neoplasm of appendix    • Peripheral vertigo    • Prostate carcinoma      Past Surgical History:  Past Surgical History:   Procedure Laterality Date   • APPENDECTOMY  1993   • COLON SURGERY  06/2014    Right hemicolectomy-Pericecal mass   • COLONOSCOPY     • PROSTATE SURGERY  2014      Home Meds:  Medications Prior to Admission   Medication Sig Dispense Refill Last Dose   • donepezil (ARICEPT) 5 MG tablet Take 1 tablet by mouth Daily.   4/27/2023 at 0730   • fludrocortisone 0.1 MG tablet TAKE 1 TABLET BY MOUTH ONCE A DAY TO PREVENT PASSING OUT AND LOW BLOOD PRESSURE   4/27/2023 at 0730   • meclizine (ANTIVERT) 25 MG tablet Take 1 tablet by mouth 3 (Three) Times a Day As Needed.   4/27/2023 at 0730   • predniSONE (DELTASONE) 1 MG tablet Take 1 tablet by mouth Daily.   4/27/2023 at 0730   • tadalafil (CIALIS) 5 MG tablet Take 1 tablet by mouth Daily.   Unknown     Current Meds:     Current Facility-Administered Medications:   •  donepezil (ARICEPT) tablet 5 mg, 5 mg, Oral, Daily, Derrick Brothers MD  •  meclizine (ANTIVERT) tablet 25 mg, 25 mg, Oral, TID PRN, Derrick Brothers MD  •  midodrine (PROAMATINE) tablet 5 mg, 5 mg, Oral, TID AC, Carrie Castañeda MD, 5 mg at 04/28/23 1845  •  pantoprazole (PROTONIX) EC tablet 40 mg, 40 mg, Oral, Q AM, Derrick Brothers MD, 40 mg at 04/29/23 0637  •  Pharmacy to Dose Zosyn, , Does not apply, Continuous PRN, Derrick Brothers MD  •  piperacillin-tazobactam (ZOSYN) 3.375 g in iso-osmotic dextrose 50 ml (premix), 3.375 g, Intravenous, Q8H, Derrick Brothers MD, 3.375 g at 04/29/23 0322  •  predniSONE (DELTASONE) tablet 1 mg, 1 mg, Oral, Daily With Breakfast, Derrick Brothers MD, 1 mg at 04/28/23 0917  •  [COMPLETED] Insert Peripheral IV, , , Once **AND** sodium chloride 0.9 % flush 10 mL, 10 mL, Intravenous, PRN, Fei Clark MD  •  sodium chloride 0.9 % infusion, 125 mL/hr,  "Intravenous, Continuous, Derrick Brothers MD, Last Rate: 125 mL/hr at 04/29/23 0326, 125 mL/hr at 04/29/23 0326  Allergies:  No Known Allergies  Social History:   Social History     Tobacco Use   • Smoking status: Former     Packs/day: 1.00     Years: 10.00     Pack years: 10.00     Types: Cigarettes   • Smokeless tobacco: Never   • Tobacco comments:     caffeine use   Substance Use Topics   • Alcohol use: No     Comment: 31 year former alcoholic       Family History:  Family History   Problem Relation Age of Onset   • Prostate cancer Brother    • Heart failure Brother    • Diabetes Other    • Heart failure Brother    • Heart failure Son           Review of Systems  See history of present illness and past medical history.    Constitutional: Remarkable for except for episode of passing out spell and surrounding symptoms such as nausea and vomiting patient otherwise feels fine and was feeling fine the day before.  Did admit to have decrease in appetite ongoing for the last 3 months an episode of weakness and tremors that led to the visit to his primary care physician on 4/17/2023 resulting in securing MRI of the brain.  Cardiovascular: Remarkable for no chest pain or shortness of breath except for syncopal episode as described  GI: Remarkable for decrease in appetite which is somewhat better denies any weight loss  : Remarkable for no burning in urination frequency urgency or flank pain  Musculoskeletal: Remarkable for no new joint aches and pain  Neurological: Remarkable for no focal weakness of arm or legs but did have some tremors and unsteadiness earlier.  Remainder of ROS is negative.      Vitals:   /75   Pulse 64   Temp 98.6 °F (37 °C)   Resp 18   Ht 177 cm (69.69\")   Wt 83.4 kg (183 lb 13.8 oz)   SpO2 96%   BMI 26.62 kg/m²   I/O: No intake or output data in the 24 hours ending 04/29/23 0958  Exam:  Patient is examined using the personal protective equipment as per guidelines from infection control " for this particular patient as enacted.  Hand washing was performed before and after patient interaction.  General Appearance:    Alert, cooperative, no distress, appears stated age   Head:    Normocephalic, without obvious abnormality, atraumatic   Eyes:    PERRL, conjunctivae/corneas clear, EOM's intact, both eyes   Ears:    Normal external ear canals, both ears   Nose:   Nares normal, septum midline, mucosa normal, no drainage    or sinus tenderness   Throat:   Lips, tongue, gums normal; oral mucosa pink and moist   Neck:   Supple, symmetrical, trachea midline, no adenopathy;     thyroid:  no enlargement/tenderness/nodules; no carotid    bruit or JVD   Back:     Symmetric, no curvature, ROM normal, no CVA tenderness   Lungs:    S1-S2 regular   Chest Wall:    No tenderness or deformity    Heart:    Regular rate and rhythm, S1 and S2 normal, no murmur, rub   or gallop   Abdomen:    Soft nontender   Extremities:   Extremities normal, atraumatic, no cyanosis or edema   Pulses:   Pulses palpable in all extremities; symmetric all extremities   Skin:   Skin color normal, Skin is warm and dry,  no rashes or palpable lesions   Neurologic:  Grossly nonfocal       Data Review:    I reviewed the patient's new clinical results.  Results from last 7 days   Lab Units 04/29/23  0643 04/27/23  2101   WBC 10*3/mm3 11.21* 8.67   HEMOGLOBIN g/dL 11.2* 12.4*   PLATELETS 10*3/mm3 245 301     Results from last 7 days   Lab Units 04/29/23  0643 04/27/23  2101 04/27/23  1003   SODIUM mmol/L 141 139  --    POTASSIUM mmol/L 4.0 4.3  --    CHLORIDE mmol/L 108* 105  --    CO2 mmol/L 26.2 26.1  --    BUN mg/dL 14 17  --    CREATININE mg/dL 1.27 1.57* 1.70*   CALCIUM mg/dL 8.8 9.5  --    GLUCOSE mg/dL 112* 168*  --      Microbiology Results (last 10 days)     Procedure Component Value - Date/Time    Blood Culture - Blood, Arm, Right [[id number]]  (Abnormal) Collected: 04/28/23 2134    Lab Status: Preliminary result Specimen: Blood from Arm,  Right Updated: 04/29/23 0942     Blood Culture Abnormal Stain     Gram Stain Anaerobic Bottle Gram negative bacilli            Assessment:  Active Hospital Problems    Diagnosis  POA   • **Syncope, unspecified syncope type [R55]  Yes      Resolved Hospital Problems   No resolved problems to display.         Plan:  See above  Cely Joyce MD   4/29/2023  09:58 EDT    Parts of this note may be an electronic transcription/translation of spoken language to printed text using the Dragon dictation system.      Electronically signed by Cely Joyce MD at 04/29/23 1231

## 2023-04-30 NOTE — PROGRESS NOTES
"Daily progress note    Primary care physician   Not known    Chief complaint  Awake and alert and doing better this morning with no new complaints.  Patient has no more fever and tolerating diet.    History of present illness  77-year-old -American male with history of severe orthostatic hypotension polymyalgia rheumatica prostate cancer and appendiceal mucinous neoplasm status post right hemicolectomy presented to [facility], [place] emergency room after syncopal episode yesterday evening.  Patient stated that he has been feeling dizzy lightheaded and fell on the ground but did not lose any consciousness.  Patient denies any chest pain shortness of breath palpitation abdominal pain nausea vomiting diarrhea.  Patient evaluated in ER found to be hypotensive admitted for management.  At the time of interview he is awake and alert and answers all question appropriately and give me a detailed history and daughter also contributed.  Patient has no complaint at the time of examination.     REVIEW OF SYSTEMS  Unremarkable      PHYSICAL EXAM   Blood pressure 112/77, pulse 62, temperature 98.1 °F (36.7 °C), temperature source Oral, resp. rate 16, height 177 cm (69.69\"), weight 83.4 kg (183 lb 13.8 oz), SpO2 96 %.    GENERAL: alert, no acute distress  SKIN: Warm, dry  HEENT:  Unremarkable  NECK:  Supple  CV: regular rhythm, regular rate  RESPIRATORY: normal effort, moving air bilaterally  ABDOMEN: soft, nontender, nondistended bowel sounds positive  MUSCULOSKELETAL: no deformity  NEURO: alert, moves all extremities, follows commands     LAB RESULTS  Lab Results (last 24 hours)     Procedure Component Value Units Date/Time    Basic Metabolic Panel [[id number]]  (Abnormal) Collected: 04/30/23 0814    Specimen: Blood Updated: 04/30/23 0914     Glucose 86 mg/dL      BUN 12 mg/dL      Creatinine 1.18 mg/dL      Sodium 142 mmol/L      Potassium 4.0 mmol/L      Chloride 110 mmol/L      CO2 25.0 mmol/L      Calcium 8.3 " mg/dL      BUN/Creatinine Ratio 10.2     Anion Gap 7.0 mmol/L      eGFR 64.3 mL/min/1.73     Narrative:      GFR Normal >60  Chronic Kidney Disease <60  Kidney Failure <15    The GFR formula is only valid for adults with stable renal function between ages 18 and 70.    CBC & Differential [[id number]]  (Abnormal) Collected: 04/30/23 0814    Specimen: Blood Updated: 04/30/23 0854    Narrative:      The following orders were created for panel order CBC & Differential.  Procedure                               Abnormality         Status                     ---------                               -----------         ------                     CBC Auto Differential[[id number]]        Abnormal            Final result                 Please view results for these tests on the individual orders.    CBC Auto Differential [[id number]]  (Abnormal) Collected: 04/30/23 0814    Specimen: Blood Updated: 04/30/23 0854     WBC 7.61 10*3/mm3      RBC 4.34 10*6/mm3      Hemoglobin 11.3 g/dL      Hematocrit 35.1 %      MCV 80.9 fL      MCH 26.0 pg      MCHC 32.2 g/dL      RDW 13.7 %      RDW-SD 40.3 fl      MPV 9.8 fL      Platelets 241 10*3/mm3      Neutrophil % 77.7 %      Lymphocyte % 11.6 %      Monocyte % 8.5 %      Eosinophil % 1.3 %      Basophil % 0.5 %      Immature Grans % 0.4 %      Neutrophils, Absolute 5.91 10*3/mm3      Lymphocytes, Absolute 0.88 10*3/mm3      Monocytes, Absolute 0.65 10*3/mm3      Eosinophils, Absolute 0.10 10*3/mm3      Basophils, Absolute 0.04 10*3/mm3      Immature Grans, Absolute 0.03 10*3/mm3      nRBC 0.0 /100 WBC     Blood Culture - Blood, Hand, Left [[id number]]  (Abnormal) Collected: 04/28/23 2134    Specimen: Blood from Hand, Left Updated: 04/30/23 0633     Blood Culture Escherichia coli     Isolated from Aerobic Bottle     Gram Stain Aerobic Bottle Gram negative bacilli    Blood Culture - Blood, Arm, Right [[id number]]  (Abnormal) Collected: 04/28/23 2134    Specimen: Blood from Arm, Right Updated:  04/30/23 0632     Blood Culture Escherichia coli     Isolated from Aerobic and Anaerobic Bottles     Gram Stain Anaerobic Bottle Gram negative bacilli      Aerobic Bottle Gram negative bacilli    Urine Culture - Urine, Urine, Clean Catch [[id number]]  (Abnormal)  (Susceptibility) Collected: 04/28/23 2310    Specimen: Urine, Clean Catch Updated: 04/30/23 0014     Urine Culture >100,000 CFU/mL Escherichia coli    Narrative:      Colonization of the urinary tract without infection is common. Treatment is discouraged unless the patient is symptomatic, pregnant, or undergoing an invasive urologic procedure.      Susceptibility      Escherichia coli      LEA      Ampicillin Resistant     Ampicillin + Sulbactam Intermediate      Cefazolin Susceptible      Cefepime Susceptible      Ceftazidime Susceptible      Ceftriaxone Susceptible      Gentamicin Susceptible      Levofloxacin Susceptible      Nitrofurantoin Susceptible      Piperacillin + Tazobactam Susceptible      Trimethoprim + Sulfamethoxazole Resistant                              Imaging Results (Last 24 Hours)     ** No results found for the last 24 hours. **        ECG 12 Lead       Component  Ref Range & Units 1 d ago   QT Interval  ms 318    Resulting Agency  [facility]             HEART RATE= 56  bpm  RR Interval= 1071  ms  SC Interval= 184  ms  P Horizontal Axis= 16  deg  P Front Axis= -19  deg  QRSD Interval= 109  ms  QT Interval= 318  ms  QRS Axis= -18  deg  T Wave Axis= -25  deg  - ABNORMAL ECG -  Sinus rhythm  Atrial premature complexes  Borderline left axis deviation  Borderline low voltage, extremity leads  PACs are new               Current Facility-Administered Medications:   •  donepezil (ARICEPT) tablet 5 mg, 5 mg, Oral, Daily, Derrick Brothers MD, 5 mg at 04/30/23 0853  •  meclizine (ANTIVERT) tablet 25 mg, 25 mg, Oral, TID PRN, Derrick Brothers MD  •  midodrine (PROAMATINE) tablet 5 mg, 5 mg, Oral, TID AC, Carrie Castañeda MD, 5 mg at 04/30/23 1132  •   pantoprazole (PROTONIX) EC tablet 40 mg, 40 mg, Oral, Q AM, Ken Brothers MD, 40 mg at 04/30/23 0853  •  Pharmacy to Dose Zosyn, , Does not apply, Continuous PRN, Ken Brothers MD  •  piperacillin-tazobactam (ZOSYN) 3.375 g in iso-osmotic dextrose 50 ml (premix), 3.375 g, Intravenous, Q8H, Ken Brothers MD, Last Rate: 0 mL/hr at 04/29/23 2230, 3.375 g at 04/30/23 1132  •  predniSONE (DELTASONE) tablet 1 mg, 1 mg, Oral, Daily With Breakfast, Ken Brothers MD, 1 mg at 04/30/23 0853  •  [COMPLETED] Insert Peripheral IV, , , Once **AND** sodium chloride 0.9 % flush 10 mL, 10 mL, Intravenous, PRN, Fei Clark MD  •  sodium chloride 0.9 % infusion, 75 mL/hr, Intravenous, Continuous, Ken Brothers MD, Last Rate: 75 mL/hr at 04/30/23 0638, 75 mL/hr at 04/30/23 0638     ASSESSMENT  Acute E. coli UTI with sepsis  E. coli bacteremia with sepsis  Syncopal episode secondary to hypotension  Sinus bradycardia,  Severe orthostatic hypotension  Polymyalgia rheumatica  Prostate cancer  Appendiceal mucinous neoplasm s/p right hemicolectomy  Dementia  History of alcohol abuse    PLAN  CPM  Continue IVF  Continue IV antibiotics  Midodrine   Cardiology consult appreciated  Infectious disease to follow patient  Continue medications  Stress ulcer DVT prophylaxis  Supportive care  PT OT  Discussed with nursing staff and family  Follow closely further recommendation current hospital course    KEN BROTHERS MD    Copied text in this note has been reviewed and is accurate as of 04/30/23

## 2023-04-30 NOTE — PROGRESS NOTES
"  Infectious Diseases Progress Note    Cely Joyce MD     [facility]  Los: 0 days  Patient Identification:  Name: Danni Figueroa  Age: 77 y.o.  Sex: male  :  1945  MRN: [id number]         Primary Care Physician: Ya Stallings MD        Subjective: Feeling much better denies any fever chills.  Denies any dizziness and weakness.  Denies any localizing pain and discomfort.  Interval History: See consultation note.    Objective:    Scheduled Meds:donepezil, 5 mg, Oral, Daily  midodrine, 5 mg, Oral, TID AC  pantoprazole, 40 mg, Oral, Q AM  piperacillin-tazobactam, 3.375 g, Intravenous, Q8H  predniSONE, 1 mg, Oral, Daily With Breakfast      Continuous Infusions:Pharmacy to Dose Zosyn,   sodium chloride, 75 mL/hr, Last Rate: 75 mL/hr (23 0638)        Vital signs in last 24 hours:  Temp:  [98.1 °F (36.7 °C)-98.9 °F (37.2 °C)] 98.1 °F (36.7 °C)  Heart Rate:  [54-74] 62  Resp:  [16-18] 16  BP: ()/(72-91) 112/77    Intake/Output:    Intake/Output Summary (Last 24 hours) at 2023 1408  Last data filed at 2023 0439  Gross per 24 hour   Intake --   Output 400 ml   Net -400 ml       Exam:  /77   Pulse 62   Temp 98.1 °F (36.7 °C) (Oral)   Resp 16   Ht 177 cm (69.69\")   Wt 83.4 kg (183 lb 13.8 oz)   SpO2 96%   BMI 26.62 kg/m²   Patient is examined using the personal protective equipment as per guidelines from infection control for this particular patient as enacted.  Hand washing was performed before and after patient interaction.  General Appearance:  Does not appear to be in any acute distress does not appear toxic or septic.                          Head:    Normocephalic, without obvious abnormality, atraumatic                           Eyes:    PERRL, conjunctivae/corneas clear, EOM's intact, both eyes                         Throat:   Lips, tongue, gums normal; oral mucosa pink and moist                           Neck:   Supple, symmetrical, trachea midline, " no JVD                         Lungs:    Clear to auscultation bilaterally, respirations unlabored                 Chest Wall:    No tenderness or deformity                          Heart:    Regular rate and rhythm, S1 and S2 normal, no murmur, no rub                                         or gallop                  Abdomen:   Soft nontender                 Extremities:   Extremities normal, atraumatic, no cyanosis or edema                        Pulses:   Pulses palpable in all extremities                            Skin:   Skin is warm and dry,  no rashes or palpable lesions                  Neurologic: Awake interactive and grossly nonfocal       Data Review:    I reviewed the patient's new clinical results.  Results from last 7 days   Lab Units 04/30/23 0814 04/29/23 0643 04/27/23 2101   WBC 10*3/mm3 7.61 11.21* 8.67   HEMOGLOBIN g/dL 11.3* 11.2* 12.4*   PLATELETS 10*3/mm3 241 245 301     Results from last 7 days   Lab Units 04/30/23 0814 04/29/23 0643 04/27/23  2101 04/27/23  1003   SODIUM mmol/L 142 141 139  --    POTASSIUM mmol/L 4.0 4.0 4.3  --    CHLORIDE mmol/L 110* 108* 105  --    CO2 mmol/L 25.0 26.2 26.1  --    BUN mg/dL 12 14 17  --    CREATININE mg/dL 1.18 1.27 1.57* 1.70*   CALCIUM mg/dL 8.3* 8.8 9.5  --    GLUCOSE mg/dL 86 112* 168*  --      Microbiology Results (last 10 days)     Procedure Component Value - Date/Time    Urine Culture - Urine, Urine, Clean Catch [[id number]]  (Abnormal)  (Susceptibility) Collected: 04/28/23 2310    Lab Status: Final result Specimen: Urine, Clean Catch Updated: 04/30/23 0014     Urine Culture >100,000 CFU/mL Escherichia coli    Narrative:      Colonization of the urinary tract without infection is common. Treatment is discouraged unless the patient is symptomatic, pregnant, or undergoing an invasive urologic procedure.      Susceptibility      Escherichia coli      LEA      Ampicillin Resistant     Ampicillin + Sulbactam Intermediate      Cefazolin Susceptible       Cefepime Susceptible      Ceftazidime Susceptible      Ceftriaxone Susceptible      Gentamicin Susceptible      Levofloxacin Susceptible      Nitrofurantoin Susceptible      Piperacillin + Tazobactam Susceptible      Trimethoprim + Sulfamethoxazole Resistant                          Blood Culture - Blood, Hand, Left [[id number]]  (Abnormal) Collected: 04/28/23 2134    Lab Status: Preliminary result Specimen: Blood from Hand, Left Updated: 04/30/23 0633     Blood Culture Escherichia coli     Isolated from Aerobic Bottle     Gram Stain Aerobic Bottle Gram negative bacilli    Blood Culture - Blood, Arm, Right [[id number]]  (Abnormal) Collected: 04/28/23 2134    Lab Status: Preliminary result Specimen: Blood from Arm, Right Updated: 04/30/23 0632     Blood Culture Escherichia coli     Isolated from Aerobic and Anaerobic Bottles     Gram Stain Anaerobic Bottle Gram negative bacilli      Aerobic Bottle Gram negative bacilli    Blood Culture ID, PCR - Blood, Arm, Right [[id number]]  (Abnormal) Collected: 04/28/23 2134    Lab Status: Final result Specimen: Blood from Arm, Right Updated: 04/29/23 1137     BCID, PCR Escherichia coli. Identification by BCID2 PCR.     BOTTLE TYPE Anaerobic Bottle    Narrative:      No resistance genes detected.      PSA 0.99      Assessment:    Syncope, unspecified syncope type  1-systemic E. 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     Recommendations/Discussions:  · See my discussion and recommendations on 4/29/2023.  · It appears that his E. coli in the urine and blood cultures is resistant to  Bactrim.  · Follow-up on CT scan of the abdomen pelvis to decide upon duration of antibiotic treatment as it could range anywhere from couple of weeks of antibiotic therapy from last negative blood culture to be extended period of time if perinephric abscess or intra-abdominal abscess is found.  · Repeat blood cultures.  Cely Joyce MD  4/30/2023  14:08 EDT    Parts of this note may be an electronic transcription/translation of spoken language to printed text using the Dragon dictation system.

## 2023-05-01 ENCOUNTER — APPOINTMENT (OUTPATIENT)
Dept: CARDIOLOGY | Facility: HOSPITAL | Age: 78
DRG: 872 | End: 2023-05-01
Payer: MEDICARE

## 2023-05-01 LAB
ANION GAP SERPL CALCULATED.3IONS-SCNC: 7 MMOL/L (ref 5–15)
AORTIC DIMENSIONLESS INDEX: 0.8 (DI)
ASCENDING AORTA: 3.9 CM
BACTERIA SPEC AEROBE CULT: ABNORMAL
BACTERIA SPEC AEROBE CULT: ABNORMAL
BASOPHILS # BLD AUTO: 0.03 10*3/MM3 (ref 0–0.2)
BASOPHILS NFR BLD AUTO: 0.5 % (ref 0–1.5)
BH CV ECHO MEAS - ACS: 2.14 CM
BH CV ECHO MEAS - AO MAX PG: 6.2 MMHG
BH CV ECHO MEAS - AO MEAN PG: 3.4 MMHG
BH CV ECHO MEAS - AO V2 MAX: 124.9 CM/SEC
BH CV ECHO MEAS - AO V2 VTI: 27 CM
BH CV ECHO MEAS - AVA(I,D): 3.7 CM2
BH CV ECHO MEAS - EDV(CUBED): 111.2 ML
BH CV ECHO MEAS - EDV(MOD-SP2): 126 ML
BH CV ECHO MEAS - EDV(MOD-SP4): 122 ML
BH CV ECHO MEAS - EF(MOD-BP): 57.5 %
BH CV ECHO MEAS - EF(MOD-SP2): 54.8 %
BH CV ECHO MEAS - EF(MOD-SP4): 59.8 %
BH CV ECHO MEAS - ESV(CUBED): 40.1 ML
BH CV ECHO MEAS - ESV(MOD-SP2): 57 ML
BH CV ECHO MEAS - ESV(MOD-SP4): 49 ML
BH CV ECHO MEAS - FS: 28.8 %
BH CV ECHO MEAS - IVS/LVPW: 1.3 CM
BH CV ECHO MEAS - IVSD: 1.51 CM
BH CV ECHO MEAS - LAT PEAK E' VEL: 11.2 CM/SEC
BH CV ECHO MEAS - LV DIASTOLIC VOL/BSA (35-75): 60.7 CM2
BH CV ECHO MEAS - LV MASS(C)D: 257 GRAMS
BH CV ECHO MEAS - LV MAX PG: 4.3 MMHG
BH CV ECHO MEAS - LV MEAN PG: 1.87 MMHG
BH CV ECHO MEAS - LV SYSTOLIC VOL/BSA (12-30): 24.4 CM2
BH CV ECHO MEAS - LV V1 MAX: 103.7 CM/SEC
BH CV ECHO MEAS - LV V1 VTI: 20.7 CM
BH CV ECHO MEAS - LVIDD: 4.8 CM
BH CV ECHO MEAS - LVIDS: 3.4 CM
BH CV ECHO MEAS - LVOT AREA: 4.8 CM2
BH CV ECHO MEAS - LVOT DIAM: 2.47 CM
BH CV ECHO MEAS - LVPWD: 1.16 CM
BH CV ECHO MEAS - MED PEAK E' VEL: 5.5 CM/SEC
BH CV ECHO MEAS - MV A DUR: 0.14 SEC
BH CV ECHO MEAS - MV A MAX VEL: 59.1 CM/SEC
BH CV ECHO MEAS - MV DEC SLOPE: 276.4 CM/SEC2
BH CV ECHO MEAS - MV DEC TIME: 220 MSEC
BH CV ECHO MEAS - MV E MAX VEL: 58.3 CM/SEC
BH CV ECHO MEAS - MV E/A: 0.99
BH CV ECHO MEAS - MV MAX PG: 3.3 MMHG
BH CV ECHO MEAS - MV MEAN PG: 1.2 MMHG
BH CV ECHO MEAS - MV P1/2T: 84.7 MSEC
BH CV ECHO MEAS - MV V2 VTI: 26.5 CM
BH CV ECHO MEAS - MVA(P1/2T): 2.6 CM2
BH CV ECHO MEAS - MVA(VTI): 3.7 CM2
BH CV ECHO MEAS - PULM A REVS DUR: 0.18 SEC
BH CV ECHO MEAS - PULM A REVS VEL: 33.4 CM/SEC
BH CV ECHO MEAS - PULM DIAS VEL: 34.7 CM/SEC
BH CV ECHO MEAS - PULM S/D: 1.1
BH CV ECHO MEAS - PULM SYS VEL: 38.1 CM/SEC
BH CV ECHO MEAS - RAP SYSTOLE: 3 MMHG
BH CV ECHO MEAS - RVSP: 29.9 MMHG
BH CV ECHO MEAS - SI(MOD-SP2): 34.3 ML/M2
BH CV ECHO MEAS - SI(MOD-SP4): 36.3 ML/M2
BH CV ECHO MEAS - SV(LVOT): 99.4 ML
BH CV ECHO MEAS - SV(MOD-SP2): 69 ML
BH CV ECHO MEAS - SV(MOD-SP4): 73 ML
BH CV ECHO MEAS - TAPSE (>1.6): 2.7 CM
BH CV ECHO MEAS - TR MAX PG: 26.9 MMHG
BH CV ECHO MEAS - TR MAX VEL: 259.3 CM/SEC
BH CV ECHO MEASUREMENTS AVERAGE E/E' RATIO: 6.98
BH CV XLRA - RV BASE: 3.5 CM
BH CV XLRA - RV LENGTH: 7.4 CM
BH CV XLRA - RV MID: 2.9 CM
BH CV XLRA - TDI S': 24 CM/SEC
BUN SERPL-MCNC: 13 MG/DL (ref 8–23)
BUN/CREAT SERPL: 10.3 (ref 7–25)
CALCIUM SPEC-SCNC: 8.3 MG/DL (ref 8.6–10.5)
CHLORIDE SERPL-SCNC: 112 MMOL/L (ref 98–107)
CO2 SERPL-SCNC: 24 MMOL/L (ref 22–29)
CREAT SERPL-MCNC: 1.26 MG/DL (ref 0.76–1.27)
DEPRECATED RDW RBC AUTO: 38.5 FL (ref 37–54)
EGFRCR SERPLBLD CKD-EPI 2021: 59.5 ML/MIN/1.73
EOSINOPHIL # BLD AUTO: 0.14 10*3/MM3 (ref 0–0.4)
EOSINOPHIL NFR BLD AUTO: 2.5 % (ref 0.3–6.2)
ERYTHROCYTE [DISTWIDTH] IN BLOOD BY AUTOMATED COUNT: 13.5 % (ref 12.3–15.4)
GLUCOSE SERPL-MCNC: 86 MG/DL (ref 65–99)
GRAM STN SPEC: ABNORMAL
HCT VFR BLD AUTO: 33.3 % (ref 37.5–51)
HGB BLD-MCNC: 10.9 G/DL (ref 13–17.7)
IMM GRANULOCYTES # BLD AUTO: 0.02 10*3/MM3 (ref 0–0.05)
IMM GRANULOCYTES NFR BLD AUTO: 0.4 % (ref 0–0.5)
ISOLATED FROM: ABNORMAL
ISOLATED FROM: ABNORMAL
LEFT ATRIUM VOLUME INDEX: 29.6 ML/M2
LYMPHOCYTES # BLD AUTO: 1.08 10*3/MM3 (ref 0.7–3.1)
LYMPHOCYTES NFR BLD AUTO: 19.5 % (ref 19.6–45.3)
MAXIMAL PREDICTED HEART RATE: 145 BPM
MCH RBC QN AUTO: 25.6 PG (ref 26.6–33)
MCHC RBC AUTO-ENTMCNC: 32.7 G/DL (ref 31.5–35.7)
MCV RBC AUTO: 78.4 FL (ref 79–97)
MONOCYTES # BLD AUTO: 0.6 10*3/MM3 (ref 0.1–0.9)
MONOCYTES NFR BLD AUTO: 10.8 % (ref 5–12)
NEUTROPHILS NFR BLD AUTO: 3.67 10*3/MM3 (ref 1.7–7)
NEUTROPHILS NFR BLD AUTO: 66.3 % (ref 42.7–76)
NRBC BLD AUTO-RTO: 0 /100 WBC (ref 0–0.2)
PLATELET # BLD AUTO: 248 10*3/MM3 (ref 140–450)
PMV BLD AUTO: 10.1 FL (ref 6–12)
POTASSIUM SERPL-SCNC: 4 MMOL/L (ref 3.5–5.2)
RBC # BLD AUTO: 4.25 10*6/MM3 (ref 4.14–5.8)
SINUS: 3.7 CM
SODIUM SERPL-SCNC: 143 MMOL/L (ref 136–145)
STRESS TARGET HR: 123 BPM
WBC NRBC COR # BLD: 5.54 10*3/MM3 (ref 3.4–10.8)

## 2023-05-01 PROCEDURE — 93306 TTE W/DOPPLER COMPLETE: CPT

## 2023-05-01 PROCEDURE — 80048 BASIC METABOLIC PNL TOTAL CA: CPT | Performed by: HOSPITALIST

## 2023-05-01 PROCEDURE — 25010000002 PIPERACILLIN SOD-TAZOBACTAM PER 1 G: Performed by: HOSPITALIST

## 2023-05-01 PROCEDURE — 85025 COMPLETE CBC W/AUTO DIFF WBC: CPT | Performed by: HOSPITALIST

## 2023-05-01 PROCEDURE — 99232 SBSQ HOSP IP/OBS MODERATE 35: CPT | Performed by: INTERNAL MEDICINE

## 2023-05-01 PROCEDURE — 63710000001 PREDNISONE PER 5 MG: Performed by: HOSPITALIST

## 2023-05-01 PROCEDURE — 93306 TTE W/DOPPLER COMPLETE: CPT | Performed by: INTERNAL MEDICINE

## 2023-05-01 RX ADMIN — TAZOBACTAM SODIUM AND PIPERACILLIN SODIUM 3.38 G: 375; 3 INJECTION, SOLUTION INTRAVENOUS at 19:03

## 2023-05-01 RX ADMIN — MIDODRINE HYDROCHLORIDE 5 MG: 5 TABLET ORAL at 17:12

## 2023-05-01 RX ADMIN — PREDNISONE 1 MG: 1 TABLET ORAL at 08:24

## 2023-05-01 RX ADMIN — MIDODRINE HYDROCHLORIDE 5 MG: 5 TABLET ORAL at 05:49

## 2023-05-01 RX ADMIN — TAZOBACTAM SODIUM AND PIPERACILLIN SODIUM 3.38 G: 375; 3 INJECTION, SOLUTION INTRAVENOUS at 05:49

## 2023-05-01 RX ADMIN — PANTOPRAZOLE SODIUM 40 MG: 40 TABLET, DELAYED RELEASE ORAL at 05:49

## 2023-05-01 RX ADMIN — DONEPEZIL HYDROCHLORIDE 5 MG: 5 TABLET, FILM COATED ORAL at 08:24

## 2023-05-01 RX ADMIN — SODIUM CHLORIDE 50 ML/HR: 9 INJECTION, SOLUTION INTRAVENOUS at 13:05

## 2023-05-01 RX ADMIN — TAZOBACTAM SODIUM AND PIPERACILLIN SODIUM 3.38 G: 375; 3 INJECTION, SOLUTION INTRAVENOUS at 13:05

## 2023-05-01 RX ADMIN — MIDODRINE HYDROCHLORIDE 5 MG: 5 TABLET ORAL at 13:05

## 2023-05-01 NOTE — PROGRESS NOTES
[facility] Follow Up    Chief Complaint: Follow up syncope    Interval History: Denies any lightheadedness, chest pain, shortness of breath.    Objective:     Objective:  Temp:  [97.7 °F (36.5 °C)-98.8 °F (37.1 °C)] 97.7 °F (36.5 °C)  Heart Rate:  [51-74] 56  Resp:  [16-18] 18  BP: ()/(72-96) 137/87     Intake/Output Summary (Last 24 hours) at 5/1/2023 0750  Last data filed at 5/1/2023 0525  Gross per 24 hour   Intake 2798 ml   Output 850 ml   Net 1948 ml     Body mass index is 26.62 kg/m².      04/27/23 2057 04/28/23  0500   Weight: 88.5 kg (195 lb) 83.4 kg (183 lb 13.8 oz)     Weight change:       Physical Exam:   General : Alert, cooperative, in no acute distress.  Neuro: Alert,cooperative and oriented.  Lungs: CTAB. Normal respiratory effort and rate.  CV: Regular rate and rhythm, normal S1 and S2, no murmurs, gallops or rubs.  ABD: Soft, nontender, nondistended. Positive bowel sounds.  Extr: No edema or cyanosis, moves all extremities.    Lab Review:   Results from last 7 days   Lab Units 05/01/23  0516 04/30/23  0814 04/29/23  0643 04/27/23  2101   SODIUM mmol/L 143 142 141 139   POTASSIUM mmol/L 4.0 4.0 4.0 4.3   CHLORIDE mmol/L 112* 110* 108* 105   CO2 mmol/L 24.0 25.0 26.2 26.1   BUN mg/dL 13 12 14 17   CREATININE mg/dL 1.26 1.18 1.27 1.57*   GLUCOSE mg/dL 86 86 112* 168*   CALCIUM mg/dL 8.3* 8.3* 8.8 9.5   AST (SGOT) U/L  --   --  10 15   ALT (SGPT) U/L  --   --  9 12     Results from last 7 days   Lab Units 04/29/23  0803 04/29/23  0643 04/27/23  2256 04/27/23  2101   HSTROP T ng/L 45* 43* 33* 26*     Results from last 7 days   Lab Units 05/01/23  0516 04/30/23  0814   WBC 10*3/mm3 5.54 7.61   HEMOGLOBIN g/dL 10.9* 11.3*   HEMATOCRIT % 33.3* 35.1*   PLATELETS 10*3/mm3 248 241     Results from last 7 days   Lab Units 04/27/23  2101   INR  1.10   APTT seconds 24.0     Results from last 7 days   Lab Units 04/27/23  2101   MAGNESIUM mg/dL 2.3     Results from last 7 days   Lab  Units 04/29/23  0643   CHOLESTEROL mg/dL 120   TRIGLYCERIDES mg/dL 53   HDL CHOL mg/dL 42     Results from last 7 days   Lab Units 04/27/23  2101   PROBNP pg/mL 171.0     Results from last 7 days   Lab Units 04/29/23  0643   TSH uIU/mL 2.960     I reviewed the patient's new clinical results.  I personally viewed and interpreted the patient's EKG  Current Medications:   Scheduled Meds:donepezil, 5 mg, Oral, Daily  midodrine, 5 mg, Oral, TID AC  pantoprazole, 40 mg, Oral, Q AM  piperacillin-tazobactam, 3.375 g, Intravenous, Q8H  predniSONE, 1 mg, Oral, Daily With Breakfast      Continuous Infusions:Pharmacy to Dose Zosyn,   sodium chloride, 50 mL/hr, Last Rate: 50 mL/hr (04/30/23 1625)        Allergies:  No Known Allergies    Assessment/Plan:     1.  Syncope.    Likely due to orthostatic hypotension possibly exacerbated by his UTI and E. coli bacteremia.  No significant issues noted on my review of his echocardiogram today.    2.  Orthostatic hypotension.  Fludrocortisone discontinued and switched to midodrine.  Blood pressures appeared improved overall with this.   3.  Bradycardia.  This is a chronic issue.  So far has not had any evidence of significant bradycardia arrhythmias that could be responsible for syncope.  Plan for ZIO monitor at discharge.  4.  UTI.  Management per primary team and ID.  5.  E. coli bacteremia.  As per #4.    -We will follow-up on the final results of the echocardiogram.  - Continue midodrine at current dose.  -Plan for ZIO monitor at discharge.      Carrie Castañeda MD  05/01/23  07:50 EDT

## 2023-05-01 NOTE — PROGRESS NOTES
"  Infectious Diseases Progress Note    Cely Joyce MD     [facility]  Los: 1 day  Patient Identification:  Name: Danni Figueroa  Age: 77 y.o.  Sex: male  :  1945  MRN: [id number]         Primary Care Physician: Ya Stallings MD        Subjective: Continues to feel well denies any specific complaints.  Denies any dizziness and weakness.  Denies any localizing pain and discomfort.  Interval History: See consultation note.    Objective:    Scheduled Meds:donepezil, 5 mg, Oral, Daily  midodrine, 5 mg, Oral, TID AC  pantoprazole, 40 mg, Oral, Q AM  piperacillin-tazobactam, 3.375 g, Intravenous, Q8H  predniSONE, 1 mg, Oral, Daily With Breakfast      Continuous Infusions:Pharmacy to Dose Zosyn,   sodium chloride, 50 mL/hr, Last Rate: 50 mL/hr (23 1625)        Vital signs in last 24 hours:  Temp:  [97.7 °F (36.5 °C)-98.8 °F (37.1 °C)] 97.9 °F (36.6 °C)  Heart Rate:  [48-62] 48  Resp:  [16-18] 18  BP: (112-144)/(76-96) 144/87    Intake/Output:    Intake/Output Summary (Last 24 hours) at 2023 1124  Last data filed at 2023 0834  Gross per 24 hour   Intake 2798 ml   Output 1200 ml   Net 1598 ml       Exam:  /87 (BP Location: Right arm, Patient Position: Sitting)   Pulse (!) 48   Temp 97.9 °F (36.6 °C) (Oral)   Resp 18   Ht 177.8 cm (70\")   Wt 83 kg (183 lb)   SpO2 97%   BMI 26.26 kg/m²   Patient is examined using the personal protective equipment as per guidelines from infection control for this particular patient as enacted.  Hand washing was performed before and after patient interaction.  General Appearance:  Does not appear to be in any acute distress does not appear toxic or septic.                          Head:    Normocephalic, without obvious abnormality, atraumatic                           Eyes:    PERRL, conjunctivae/corneas clear, EOM's intact, both eyes                         Throat:   Lips, tongue, gums normal; oral mucosa pink and moist               "             Neck:   Supple, symmetrical, trachea midline, no JVD                         Lungs:    Clear to auscultation bilaterally, respirations unlabored                 Chest Wall:    No tenderness or deformity                          Heart:    Regular rate and rhythm, S1 and S2 normal, no murmur, no rub                                         or gallop                  Abdomen:   Soft nontender                 Extremities:   Extremities normal, atraumatic, no cyanosis or edema                        Pulses:   Pulses palpable in all extremities                            Skin:   Skin is warm and dry,  no rashes or palpable lesions                  Neurologic: Awake interactive and grossly nonfocal       Data Review:    I reviewed the patient's new clinical results.  Results from last 7 days   Lab Units 05/01/23  0516 04/30/23  0814 04/29/23  0643 04/27/23  2101   WBC 10*3/mm3 5.54 7.61 11.21* 8.67   HEMOGLOBIN g/dL 10.9* 11.3* 11.2* 12.4*   PLATELETS 10*3/mm3 248 241 245 301     Results from last 7 days   Lab Units 05/01/23 0516 04/30/23  0814 04/29/23 0643 04/27/23  2101 04/27/23  1003   SODIUM mmol/L 143 142 141 139  --    POTASSIUM mmol/L 4.0 4.0 4.0 4.3  --    CHLORIDE mmol/L 112* 110* 108* 105  --    CO2 mmol/L 24.0 25.0 26.2 26.1  --    BUN mg/dL 13 12 14 17  --    CREATININE mg/dL 1.26 1.18 1.27 1.57* 1.70*   CALCIUM mg/dL 8.3* 8.3* 8.8 9.5  --    GLUCOSE mg/dL 86 86 112* 168*  --      Microbiology Results (last 10 days)     Procedure Component Value - Date/Time    Urine Culture - Urine, Urine, Clean Catch [[id number]]  (Abnormal)  (Susceptibility) Collected: 04/28/23 2310    Lab Status: Final result Specimen: Urine, Clean Catch Updated: 04/30/23 0014     Urine Culture >100,000 CFU/mL Escherichia coli    Narrative:      Colonization of the urinary tract without infection is common. Treatment is discouraged unless the patient is symptomatic, pregnant, or undergoing an invasive urologic procedure.       Susceptibility      Escherichia coli      LEA      Ampicillin Resistant     Ampicillin + Sulbactam Intermediate      Cefazolin Susceptible      Cefepime Susceptible      Ceftazidime Susceptible      Ceftriaxone Susceptible      Gentamicin Susceptible      Levofloxacin Susceptible      Nitrofurantoin Susceptible      Piperacillin + Tazobactam Susceptible      Trimethoprim + Sulfamethoxazole Resistant                          Blood Culture - Blood, Hand, Left [[id number]]  (Abnormal) Collected: 04/28/23 2134    Lab Status: Final result Specimen: Blood from Hand, Left Updated: 05/01/23 0607     Blood Culture Escherichia coli     Isolated from Aerobic Bottle     Gram Stain Aerobic Bottle Gram negative bacilli    Narrative:      Refer to previous blood culture collected on 04/28/23 for MICs    Blood Culture - Blood, Arm, Right [[id number]]  (Abnormal)  (Susceptibility) Collected: 04/28/23 2134    Lab Status: Final result Specimen: Blood from Arm, Right Updated: 05/01/23 0606     Blood Culture Escherichia coli     Isolated from Aerobic and Anaerobic Bottles     Gram Stain Anaerobic Bottle Gram negative bacilli      Aerobic Bottle Gram negative bacilli    Susceptibility      Escherichia coli      LEA      Ampicillin Resistant     Ampicillin + Sulbactam Intermediate      Cefepime Susceptible      Ceftazidime Susceptible      Ceftriaxone Susceptible      Gentamicin Susceptible      Levofloxacin Susceptible      Piperacillin + Tazobactam Susceptible      Trimethoprim + Sulfamethoxazole Resistant                      Susceptibility Comments     Escherichia coli    Cefazolin sensitivity will not be reported for Enterobacteriaceae in non-urine isolates. If cefazolin is preferred, please call the microbiology lab to request an E-test.  With the exception of urinary-sourced infections, aminoglycosides should not be used as monotherapy.             Blood Culture ID, PCR - Blood, Arm, Right [[id number]]  (Abnormal) Collected: 04/28/23  2134    Lab Status: Final result Specimen: Blood from Arm, Right Updated: 04/29/23 1137     BCID, PCR Escherichia coli. Identification by BCID2 PCR.     BOTTLE TYPE Anaerobic Bottle    Narrative:      No resistance genes detected.      PSA 0.99      Assessment:    Syncope, unspecified syncope type  1-systemic E. 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     Recommendations/Discussions:  · See my discussion and recommendations on 4/29/2023.  · It appears that his E. coli in the urine and blood cultures is resistant to Bactrim.  · CT scan of the abdomen and pelvis did not reveal any  tract abnormalities that would require intervention.  · Follow-up on repeat blood cultures.  · If repeat blood cultures are negative then his antibiotic regimen can be either switch to IV ceftriaxone to be administered once daily for 2 weeks from last negative blood culture for severe sepsis due to bacteremic cystitis with out of the hospital follow-up with urology service.  · Oral options such as Levaquin or ciprofloxacin discussed with the patient.  I explained to the patient that blackbox warning associated with quinolone use consisting of QTc prolongation, ligament and tendon rupture and and weakening, weakening of the larger arteries of the body increasing the risk of development aneurysm, neuropathy and neuropsychiatric.  · I also discussed with patient and family members about side effects of antibiotic therapy in general such as nausea vomiting diarrhea rash C. difficile infection risk and yeast infection and selection of resistant pathogens  hepatic and renal dysfunction and interaction with other medications.  · Patient and his wife understand the patient needs antibiotic therapy and will make a decision about oral versus IV therapy after further review with written information about quinolones.  · I will explained to the mother the potential risks involved with IV access such as midline or PICC line including infection of the midline or PICC line as well as risk for DVT.  Cely Joyce MD  5/1/2023  11:24 EDT    Parts of this note may be an electronic transcription/translation of spoken language to printed text using the Dragon dictation system.

## 2023-05-01 NOTE — PROGRESS NOTES
"Daily progress note    Primary care physician   Not known    Chief complaint  Doing much better with no new complaints and family at bedside.    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  Patient stated that he has been feeling dizzy lightheaded and fell on the ground but did not lose any consciousness.  Patient denies any chest pain shortness of breath palpitation abdominal pain nausea vomiting diarrhea.  Patient evaluated in ER found to be hypotensive admitted for management.  At the time of interview he is awake and alert and answers all question appropriately and give me a detailed history and daughter also contributed.  Patient has no complaint at the time of examination.     REVIEW OF SYSTEMS  Unremarkable      PHYSICAL EXAM   Blood pressure 143/98, pulse 55, temperature 98.5 °F (36.9 °C), temperature source Oral, resp. rate 18, height 177.8 cm (70\"), weight 83 kg (183 lb), SpO2 98 %.    GENERAL: alert, no acute distress  SKIN: Warm, dry  HEENT:  Unremarkable  NECK:  Supple  CV: regular rhythm, regular rate  RESPIRATORY: normal effort, moving air bilaterally  ABDOMEN: soft, nontender, nondistended bowel sounds positive  MUSCULOSKELETAL: no deformity  NEURO: alert, moves all extremities, follows commands     LAB RESULTS  Lab Results (last 24 hours)     Procedure Component Value Units Date/Time    CBC & Differential [[id number]]  (Abnormal) Collected: 05/01/23 0516    Specimen: Blood Updated: 05/01/23 0607    Narrative:      The following orders were created for panel order CBC & Differential.  Procedure                               Abnormality         Status                     ---------                               -----------         ------       "               CBC Auto Differential[[id number]]        Abnormal            Final result                 Please view results for these tests on the individual orders.    CBC Auto Differential [[id number]]  (Abnormal) Collected: 05/01/23 0516    Specimen: Blood Updated: 05/01/23 0607     WBC 5.54 10*3/mm3      RBC 4.25 10*6/mm3      Hemoglobin 10.9 g/dL      Hematocrit 33.3 %      MCV 78.4 fL      MCH 25.6 pg      MCHC 32.7 g/dL      RDW 13.5 %      RDW-SD 38.5 fl      MPV 10.1 fL      Platelets 248 10*3/mm3      Neutrophil % 66.3 %      Lymphocyte % 19.5 %      Monocyte % 10.8 %      Eosinophil % 2.5 %      Basophil % 0.5 %      Immature Grans % 0.4 %      Neutrophils, Absolute 3.67 10*3/mm3      Lymphocytes, Absolute 1.08 10*3/mm3      Monocytes, Absolute 0.60 10*3/mm3      Eosinophils, Absolute 0.14 10*3/mm3      Basophils, Absolute 0.03 10*3/mm3      Immature Grans, Absolute 0.02 10*3/mm3      nRBC 0.0 /100 WBC     Blood Culture - Blood, Hand, Left [[id number]]  (Abnormal) Collected: 04/28/23 2134    Specimen: Blood from Hand, Left Updated: 05/01/23 0607     Blood Culture Escherichia coli     Isolated from Aerobic Bottle     Gram Stain Aerobic Bottle Gram negative bacilli    Narrative:      Refer to previous blood culture collected on 04/28/23 for MICs    Basic Metabolic Panel [[id number]]  (Abnormal) Collected: 05/01/23 0516    Specimen: Blood Updated: 05/01/23 0606     Glucose 86 mg/dL      BUN 13 mg/dL      Creatinine 1.26 mg/dL      Sodium 143 mmol/L      Potassium 4.0 mmol/L      Chloride 112 mmol/L      CO2 24.0 mmol/L      Calcium 8.3 mg/dL      BUN/Creatinine Ratio 10.3     Anion Gap 7.0 mmol/L      eGFR 59.5 mL/min/1.73     Narrative:      GFR Normal >60  Chronic Kidney Disease <60  Kidney Failure <15    The GFR formula is only valid for adults with stable renal function between ages 18 and 70.    Blood Culture - Blood, Arm, Right [[id number]]  (Abnormal)  (Susceptibility) Collected: 04/28/23 2134    Specimen:  Blood from Arm, Right Updated: 05/01/23 0606     Blood Culture Escherichia coli     Isolated from Aerobic and Anaerobic Bottles     Gram Stain Anaerobic Bottle Gram negative bacilli      Aerobic Bottle Gram negative bacilli    Susceptibility      Escherichia coli      LEA      Ampicillin Resistant     Ampicillin + Sulbactam Intermediate      Cefepime Susceptible      Ceftazidime Susceptible      Ceftriaxone Susceptible      Gentamicin Susceptible      Levofloxacin Susceptible      Piperacillin + Tazobactam Susceptible      Trimethoprim + Sulfamethoxazole Resistant                      Susceptibility Comments     Escherichia coli    Cefazolin sensitivity will not be reported for Enterobacteriaceae in non-urine isolates. If cefazolin is preferred, please call the microbiology lab to request an E-test.  With the exception of urinary-sourced infections, aminoglycosides should not be used as monotherapy.             Blood Culture - Blood, Arm, Left [[id number]] Collected: 04/30/23 1512    Specimen: Blood from Arm, Left Updated: 04/30/23 1522    Blood Culture - Blood, Arm, Left [[id number]] Collected: 04/30/23 1513    Specimen: Blood from Arm, Left Updated: 04/30/23 1522        Imaging Results (Last 24 Hours)     Procedure Component Value Units Date/Time    CT Abdomen Pelvis Without Contrast [[id number]] Collected: 04/30/23 1952     Updated: 04/30/23 2001    Narrative:      CT OF THE ABDOMEN AND PELVIS WITHOUT CONTRAST     HISTORY: Escherichia coli bacteremia     COMPARISON: 12/27/2019     TECHNIQUE: Axial CT imaging was obtained through the abdomen and pelvis.  No IV contrast was administered.     FINDINGS:  Images through the lung bases demonstrate bibasilar scarring. There is  dilatation of the visualized ascending thoracic aorta, measuring 4.3 cm.  Distal descending thoracic aorta is also dilated, measuring up to 3.3  cm. There is trace bilateral pleural fluid. There is also trace  pericardial fluid. No suspicious  hepatic lesions are seen. The stomach,  duodenum, adrenal glands, gallbladder, spleen, and pancreas are all  normal. No hydronephrosis is seen on either side, although the patient  does have fairly extensive bilateral perinephric stranding. Urinary  bladder appears somewhat thick-walled. This may be related to incomplete  distention, although cystitis is not excluded. There are some dystrophic  calcifications within the prostate gland. There is colonic  diverticulosis. The patient is status post right hemicolectomy. There is  no evidence of obstruction. The common iliac arteries are dilated  bilaterally, with the right measuring up to 1.7 cm, and the left  measuring 1.5 cm. No definite intra-abdominal abscess is seen. No acute  osseous abnormalities are identified.       Impression:         1. The patient has extensive bilateral perinephric stranding, as well as  a thick-walled appearance to the urinary bladder. Appearance may  represent cystitis and ascending urinary tract infection.     Radiation dose reduction techniques were utilized, including automated  exposure control and exposure modulation based on body size.     This report was finalized on 4/30/2023 7:58 PM by Dr. Vanessa Webster M.D.           ECG 12 Lead       Component  Ref Range & Units 1 d ago   QT Interval  ms 318    Resulting Agency  [facility]             HEART RATE= 56  bpm  RR Interval= 1071  ms  ND Interval= 184  ms  P Horizontal Axis= 16  deg  P Front Axis= -19  deg  QRSD Interval= 109  ms  QT Interval= 318  ms  QRS Axis= -18  deg  T Wave Axis= -25  deg  - ABNORMAL ECG -  Sinus rhythm  Atrial premature complexes  Borderline left axis deviation  Borderline low voltage, extremity leads  PACs are new               Current Facility-Administered Medications:   •  donepezil (ARICEPT) tablet 5 mg, 5 mg, Oral, Daily, Derrick Brothers MD, 5 mg at 05/01/23 0824  •  meclizine (ANTIVERT) tablet 25 mg, 25 mg, Oral, TID PRN, Derrick Brothers MD  •  midodrine  (PROAMATINE) tablet 5 mg, 5 mg, Oral, TID AC, Carrie Castañeda MD, 5 mg at 05/01/23 1305  •  pantoprazole (PROTONIX) EC tablet 40 mg, 40 mg, Oral, Q AM, Ken Brothers MD, 40 mg at 05/01/23 0549  •  Pharmacy to Dose Zosyn, , Does not apply, Continuous PRN, Ken Brothers MD  •  piperacillin-tazobactam (ZOSYN) 3.375 g in iso-osmotic dextrose 50 ml (premix), 3.375 g, Intravenous, Q8H, Ken Brothers MD, Last Rate: 0 mL/hr at 04/29/23 2230, 3.375 g at 05/01/23 1305  •  predniSONE (DELTASONE) tablet 1 mg, 1 mg, Oral, Daily With Breakfast, Ken Brothers MD, 1 mg at 05/01/23 0824  •  [COMPLETED] Insert Peripheral IV, , , Once **AND** sodium chloride 0.9 % flush 10 mL, 10 mL, Intravenous, PRN, Fei Clark MD  •  sodium chloride 0.9 % infusion, 50 mL/hr, Intravenous, Continuous, Ken Brothers MD, Last Rate: 50 mL/hr at 05/01/23 1305, 50 mL/hr at 05/01/23 1305     ASSESSMENT  Acute E. coli UTI with sepsis  E. coli bacteremia with sepsis  Syncopal episode secondary to hypotension  Sinus bradycardia,  Severe orthostatic hypotension  Polymyalgia rheumatica  Prostate cancer  Appendiceal mucinous neoplasm s/p right hemicolectomy  Dementia  History of alcohol abuse    PLAN  CPM  Continue IVF  Continue IV antibiotics  Midodrine   Repeat blood cultures  Cardiology consult appreciated  Infectious disease to follow patient  Continue medications  Stress ulcer DVT prophylaxis  Supportive care  PT OT  Discussed with nursing staff and family  Follow closely further recommendation current hospital course    KEN BROTHERS MD    Copied text in this note has been reviewed and is accurate as of 05/01/23

## 2023-05-01 NOTE — CASE MANAGEMENT/SOCIAL WORK
Continued Stay Note  [facility]     Patient Name: Danni Figueroa  MRN: [id number]  Today's Date: 5/1/2023    Admit Date: 4/27/2023    Plan: Home with spouse vs HH/Infusion referrals pending   Discharge Plan     Row Name 05/01/23 1315       Plan    Plan Home with spouse vs HH/Infusion referrals pending    Patient/Family in Agreement with Plan yes    Plan Comments CCP spoke with patient and patient's wife at bedside regarding discharge plan. Patient agreeable to referrals placed to [facility] and [facility] should IV antibiotics be needed at discharge. CCP following for IV antibotics vs PO. Jennifer TEJADA LCSW               Discharge Codes    No documentation.               Expected Discharge Date and Time     Expected Discharge Date Expected Discharge Time    May 4, 2023             Jennifer Feliciano

## 2023-05-02 LAB
ANION GAP SERPL CALCULATED.3IONS-SCNC: 6.4 MMOL/L (ref 5–15)
BASOPHILS # BLD AUTO: 0.04 10*3/MM3 (ref 0–0.2)
BASOPHILS NFR BLD AUTO: 0.7 % (ref 0–1.5)
BUN SERPL-MCNC: 12 MG/DL (ref 8–23)
BUN/CREAT SERPL: 9 (ref 7–25)
CALCIUM SPEC-SCNC: 9.2 MG/DL (ref 8.6–10.5)
CHLORIDE SERPL-SCNC: 111 MMOL/L (ref 98–107)
CO2 SERPL-SCNC: 25.6 MMOL/L (ref 22–29)
CREAT SERPL-MCNC: 1.33 MG/DL (ref 0.76–1.27)
DEPRECATED RDW RBC AUTO: 40.3 FL (ref 37–54)
EGFRCR SERPLBLD CKD-EPI 2021: 55.7 ML/MIN/1.73
EOSINOPHIL # BLD AUTO: 0.18 10*3/MM3 (ref 0–0.4)
EOSINOPHIL NFR BLD AUTO: 3 % (ref 0.3–6.2)
ERYTHROCYTE [DISTWIDTH] IN BLOOD BY AUTOMATED COUNT: 13.9 % (ref 12.3–15.4)
GLUCOSE SERPL-MCNC: 85 MG/DL (ref 65–99)
HCT VFR BLD AUTO: 34.4 % (ref 37.5–51)
HGB BLD-MCNC: 11.1 G/DL (ref 13–17.7)
IMM GRANULOCYTES # BLD AUTO: 0.01 10*3/MM3 (ref 0–0.05)
IMM GRANULOCYTES NFR BLD AUTO: 0.2 % (ref 0–0.5)
LYMPHOCYTES # BLD AUTO: 1.29 10*3/MM3 (ref 0.7–3.1)
LYMPHOCYTES NFR BLD AUTO: 21.8 % (ref 19.6–45.3)
MCH RBC QN AUTO: 26 PG (ref 26.6–33)
MCHC RBC AUTO-ENTMCNC: 32.3 G/DL (ref 31.5–35.7)
MCV RBC AUTO: 80.6 FL (ref 79–97)
MONOCYTES # BLD AUTO: 0.49 10*3/MM3 (ref 0.1–0.9)
MONOCYTES NFR BLD AUTO: 8.3 % (ref 5–12)
NEUTROPHILS NFR BLD AUTO: 3.91 10*3/MM3 (ref 1.7–7)
NEUTROPHILS NFR BLD AUTO: 66 % (ref 42.7–76)
NRBC BLD AUTO-RTO: 0 /100 WBC (ref 0–0.2)
PLATELET # BLD AUTO: 257 10*3/MM3 (ref 140–450)
PMV BLD AUTO: 9.6 FL (ref 6–12)
POTASSIUM SERPL-SCNC: 4.1 MMOL/L (ref 3.5–5.2)
RBC # BLD AUTO: 4.27 10*6/MM3 (ref 4.14–5.8)
SODIUM SERPL-SCNC: 143 MMOL/L (ref 136–145)
WBC NRBC COR # BLD: 5.92 10*3/MM3 (ref 3.4–10.8)

## 2023-05-02 PROCEDURE — 25010000002 PIPERACILLIN SOD-TAZOBACTAM PER 1 G: Performed by: HOSPITALIST

## 2023-05-02 PROCEDURE — 85025 COMPLETE CBC W/AUTO DIFF WBC: CPT | Performed by: HOSPITALIST

## 2023-05-02 PROCEDURE — 99232 SBSQ HOSP IP/OBS MODERATE 35: CPT | Performed by: NURSE PRACTITIONER

## 2023-05-02 PROCEDURE — 80048 BASIC METABOLIC PNL TOTAL CA: CPT | Performed by: HOSPITALIST

## 2023-05-02 PROCEDURE — 63710000001 PREDNISONE PER 5 MG: Performed by: HOSPITALIST

## 2023-05-02 PROCEDURE — 25010000002 CEFTRIAXONE PER 250 MG: Performed by: INTERNAL MEDICINE

## 2023-05-02 RX ORDER — LEVOFLOXACIN 750 MG/1
750 TABLET ORAL EVERY 24 HOURS
Status: DISCONTINUED | OUTPATIENT
Start: 2023-05-03 | End: 2023-05-03 | Stop reason: HOSPADM

## 2023-05-02 RX ADMIN — DONEPEZIL HYDROCHLORIDE 5 MG: 5 TABLET, FILM COATED ORAL at 09:44

## 2023-05-02 RX ADMIN — PANTOPRAZOLE SODIUM 40 MG: 40 TABLET, DELAYED RELEASE ORAL at 06:57

## 2023-05-02 RX ADMIN — PREDNISONE 1 MG: 1 TABLET ORAL at 09:44

## 2023-05-02 RX ADMIN — CEFTRIAXONE 2 G: 2 INJECTION, POWDER, FOR SOLUTION INTRAMUSCULAR; INTRAVENOUS at 06:57

## 2023-05-02 RX ADMIN — MIDODRINE HYDROCHLORIDE 5 MG: 5 TABLET ORAL at 06:57

## 2023-05-02 RX ADMIN — TAZOBACTAM SODIUM AND PIPERACILLIN SODIUM 3.38 G: 375; 3 INJECTION, SOLUTION INTRAVENOUS at 02:56

## 2023-05-02 RX ADMIN — MIDODRINE HYDROCHLORIDE 5 MG: 5 TABLET ORAL at 12:59

## 2023-05-02 NOTE — PROGRESS NOTES
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  Patient stated that he has been feeling dizzy lightheaded and fell on the ground but did not lose any consciousness.  Patient denies any chest pain shortness of breath palpitation abdominal pain nausea vomiting diarrhea.  Patient evaluated in ER found to be hypotensive admitted for management.  At the time of interview he is awake and alert and answers all question appropriately and give me a detailed history and daughter also contributed.  Patient has no complaint at the time of examination.     REVIEW OF SYSTEMS  Unremarkable      PHYSICAL EXAM   Blood pressure 148/91, pulse 54, temperature 98 °F (36.7 °C), temperature source Oral, resp. rate 18, height 177.8 cm (70\"), weight 83 kg (183 lb), SpO2 99 %.    GENERAL: alert, no acute distress  SKIN: Warm, dry  HEENT:  Unremarkable  NECK:  Supple  CV: regular rhythm, regular rate  RESPIRATORY: normal effort, moving air bilaterally  ABDOMEN: soft, nontender, nondistended bowel sounds positive  MUSCULOSKELETAL: no deformity  NEURO: alert, moves all extremities, follows commands     LAB RESULTS  Lab Results (last 24 hours)     Procedure Component Value Units Date/Time    Basic Metabolic Panel [[id number]]  (Abnormal) Collected: 05/02/23 0529    Specimen: Blood Updated: 05/02/23 0619     Glucose 85 mg/dL      BUN 12 mg/dL      Creatinine 1.33 mg/dL      Sodium 143 mmol/L      Potassium 4.1 mmol/L      Chloride 111 mmol/L      CO2 25.6 mmol/L      Calcium 9.2 mg/dL      BUN/Creatinine Ratio 9.0     Anion Gap 6.4 mmol/L      eGFR 55.7 mL/min/1.73     " Narrative:      GFR Normal >60  Chronic Kidney Disease <60  Kidney Failure <15    The GFR formula is only valid for adults with stable renal function between ages 18 and 70.    CBC & Differential [[id number]]  (Abnormal) Collected: 05/02/23 0529    Specimen: Blood Updated: 05/02/23 0606    Narrative:      The following orders were created for panel order CBC & Differential.  Procedure                               Abnormality         Status                     ---------                               -----------         ------                     CBC Auto Differential[[id number]]        Abnormal            Final result                 Please view results for these tests on the individual orders.    CBC Auto Differential [[id number]]  (Abnormal) Collected: 05/02/23 0529    Specimen: Blood Updated: 05/02/23 0606     WBC 5.92 10*3/mm3      RBC 4.27 10*6/mm3      Hemoglobin 11.1 g/dL      Hematocrit 34.4 %      MCV 80.6 fL      MCH 26.0 pg      MCHC 32.3 g/dL      RDW 13.9 %      RDW-SD 40.3 fl      MPV 9.6 fL      Platelets 257 10*3/mm3      Neutrophil % 66.0 %      Lymphocyte % 21.8 %      Monocyte % 8.3 %      Eosinophil % 3.0 %      Basophil % 0.7 %      Immature Grans % 0.2 %      Neutrophils, Absolute 3.91 10*3/mm3      Lymphocytes, Absolute 1.29 10*3/mm3      Monocytes, Absolute 0.49 10*3/mm3      Eosinophils, Absolute 0.18 10*3/mm3      Basophils, Absolute 0.04 10*3/mm3      Immature Grans, Absolute 0.01 10*3/mm3      nRBC 0.0 /100 WBC     Blood Culture - Blood, Arm, Left [[id number]]  (Normal) Collected: 04/30/23 1512    Specimen: Blood from Arm, Left Updated: 05/01/23 1530     Blood Culture No growth at 24 hours    Blood Culture - Blood, Arm, Left [[id number]]  (Normal) Collected: 04/30/23 1513    Specimen: Blood from Arm, Left Updated: 05/01/23 1530     Blood Culture No growth at 24 hours        Imaging Results (Last 24 Hours)     ** No results found for the last 24 hours. **        ECG 12 Lead       Component  Ref  Range & Units 1 d ago   QT Interval  ms 318    Resulting Agency  [facility]             HEART RATE= 56  bpm  RR Interval= 1071  ms  MS Interval= 184  ms  P Horizontal Axis= 16  deg  P Front Axis= -19  deg  QRSD Interval= 109  ms  QT Interval= 318  ms  QRS Axis= -18  deg  T Wave Axis= -25  deg  - ABNORMAL ECG -  Sinus rhythm  Atrial premature complexes  Borderline left axis deviation  Borderline low voltage, extremity leads  PACs are new               Current Facility-Administered Medications:   •  donepezil (ARICEPT) tablet 5 mg, 5 mg, Oral, Daily, Derrick Brothers MD, 5 mg at 05/02/23 0944  •  [START ON 5/3/2023] levoFLOXacin (LEVAQUIN) tablet 750 mg, 750 mg, Oral, Q24H, Cely Joyce MD  •  meclizine (ANTIVERT) tablet 25 mg, 25 mg, Oral, TID PRN, Derrick Brothers MD  •  midodrine (PROAMATINE) tablet 5 mg, 5 mg, Oral, TID AC, Carrie Castañeda MD, 5 mg at 05/02/23 1259  •  pantoprazole (PROTONIX) EC tablet 40 mg, 40 mg, Oral, Q AM, Derrick Brothers MD, 40 mg at 05/02/23 0657  •  predniSONE (DELTASONE) tablet 1 mg, 1 mg, Oral, Daily With Breakfast, Derrick Brothers MD, 1 mg at 05/02/23 0944  •  [COMPLETED] Insert Peripheral IV, , , Once **AND** sodium chloride 0.9 % flush 10 mL, 10 mL, Intravenous, PRN, Fei Clark MD  •  sodium chloride 0.9 % infusion, 50 mL/hr, Intravenous, Continuous, Derrick Brothers MD, Last Rate: 50 mL/hr at 05/01/23 1305, 50 mL/hr at 05/01/23 1305     ASSESSMENT  Acute E. coli UTI with sepsis  E. coli bacteremia with sepsis  Syncopal episode secondary to hypotension  Sinus bradycardia,  Severe orthostatic hypotension  Polymyalgia rheumatica  Prostate cancer  Appendiceal mucinous neoplasm s/p right hemicolectomy  Dementia  History of alcohol abuse    PLAN  CPM  Discontinue IV fluid  Change antibiotics to p.o.  Midodrine   Repeat blood cultures negative so far  Cardiology consult appreciated  Infectious disease to follow patient  Continue medications  Stress ulcer DVT prophylaxis  Supportive care  PT  OT  Discussed with nursing staff and family  Discharge home in a.m. if repeat blood cultures negative okay with all    KEN MCFARLANE MD    Copied text in this note has been reviewed and is accurate as of 05/02/23

## 2023-05-02 NOTE — PROGRESS NOTES
"  Infectious Diseases Progress Note    eCly Joyce MD     [facility]  Los: 2 days  Patient Identification:  Name: Danni Figueroa  Age: 77 y.o.  Sex: male  :  1945  MRN: [id number]         Primary Care Physician: Ya Stallings MD        Subjective: Feeling better denies any fever and chills.  Wants to know when can he go home..  Denies any dizziness and weakness.  Denies any localizing pain and discomfort.  Interval History: See consultation note.    Objective:    Scheduled Meds:cefTRIAXone, 2 g, Intravenous, Q24H  donepezil, 5 mg, Oral, Daily  midodrine, 5 mg, Oral, TID AC  pantoprazole, 40 mg, Oral, Q AM  predniSONE, 1 mg, Oral, Daily With Breakfast      Continuous Infusions:Pharmacy to Dose Zosyn,   sodium chloride, 50 mL/hr, Last Rate: 50 mL/hr (23 1305)        Vital signs in last 24 hours:  Temp:  [97.9 °F (36.6 °C)-98.5 °F (36.9 °C)] 98.1 °F (36.7 °C)  Heart Rate:  [44-55] 51  Resp:  [16-18] 16  BP: (137-158)/(83-99) 147/94    Intake/Output:    Intake/Output Summary (Last 24 hours) at 2023 0816  Last data filed at 2023 0719  Gross per 24 hour   Intake 1377 ml   Output 1900 ml   Net -523 ml       Exam:  /94   Pulse 51   Temp 98.1 °F (36.7 °C) (Oral)   Resp 16   Ht 177.8 cm (70\")   Wt 83 kg (183 lb)   SpO2 99%   BMI 26.26 kg/m²   Patient is examined using the personal protective equipment as per guidelines from infection control for this particular patient as enacted.  Hand washing was performed before and after patient interaction.  General Appearance:  Does not appear to be in any acute distress does not appear toxic or septic.                          Head:    Normocephalic, without obvious abnormality, atraumatic                           Eyes:    PERRL, conjunctivae/corneas clear, EOM's intact, both eyes                         Throat:   Lips, tongue, gums normal; oral mucosa pink and moist                           Neck:   Supple, symmetrical, " trachea midline, no JVD                         Lungs:    Clear to auscultation bilaterally, respirations unlabored                 Chest Wall:    No tenderness or deformity                          Heart:    Regular rate and rhythm, S1 and S2 normal, no murmur, no rub                                         or gallop                  Abdomen:   Soft nontender                 Extremities:   Extremities normal, atraumatic, no cyanosis or edema                        Pulses:   Pulses palpable in all extremities                            Skin:   Skin is warm and dry,  no rashes or palpable lesions                  Neurologic: Awake interactive and grossly nonfocal       Data Review:    I reviewed the patient's new clinical results.  Results from last 7 days   Lab Units 05/02/23 0529 05/01/23  0516 04/30/23  0814 04/29/23  0643 04/27/23  2101   WBC 10*3/mm3 5.92 5.54 7.61 11.21* 8.67   HEMOGLOBIN g/dL 11.1* 10.9* 11.3* 11.2* 12.4*   PLATELETS 10*3/mm3 257 248 241 245 301     Results from last 7 days   Lab Units 05/02/23 0529 05/01/23  0516 04/30/23  0814 04/29/23  0643 04/27/23  2101 04/27/23  1003   SODIUM mmol/L 143 143 142 141 139  --    POTASSIUM mmol/L 4.1 4.0 4.0 4.0 4.3  --    CHLORIDE mmol/L 111* 112* 110* 108* 105  --    CO2 mmol/L 25.6 24.0 25.0 26.2 26.1  --    BUN mg/dL 12 13 12 14 17  --    CREATININE mg/dL 1.33* 1.26 1.18 1.27 1.57* 1.70*   CALCIUM mg/dL 9.2 8.3* 8.3* 8.8 9.5  --    GLUCOSE mg/dL 85 86 86 112* 168*  --      Microbiology Results (last 10 days)     Procedure Component Value - Date/Time    Blood Culture - Blood, Arm, Left [[id number]]  (Normal) Collected: 04/30/23 1513    Lab Status: Preliminary result Specimen: Blood from Arm, Left Updated: 05/01/23 1530     Blood Culture No growth at 24 hours    Blood Culture - Blood, Arm, Left [[id number]]  (Normal) Collected: 04/30/23 1512    Lab Status: Preliminary result Specimen: Blood from Arm, Left Updated: 05/01/23 1530     Blood Culture No growth  at 24 hours    Urine Culture - Urine, Urine, Clean Catch [[id number]]  (Abnormal)  (Susceptibility) Collected: 04/28/23 2310    Lab Status: Final result Specimen: Urine, Clean Catch Updated: 04/30/23 0014     Urine Culture >100,000 CFU/mL Escherichia coli    Narrative:      Colonization of the urinary tract without infection is common. Treatment is discouraged unless the patient is symptomatic, pregnant, or undergoing an invasive urologic procedure.      Susceptibility      Escherichia coli      LEA      Ampicillin Resistant     Ampicillin + Sulbactam Intermediate      Cefazolin Susceptible      Cefepime Susceptible      Ceftazidime Susceptible      Ceftriaxone Susceptible      Gentamicin Susceptible      Levofloxacin Susceptible      Nitrofurantoin Susceptible      Piperacillin + Tazobactam Susceptible      Trimethoprim + Sulfamethoxazole Resistant                          Blood Culture - Blood, Hand, Left [[id number]]  (Abnormal) Collected: 04/28/23 2134    Lab Status: Final result Specimen: Blood from Hand, Left Updated: 05/01/23 0607     Blood Culture Escherichia coli     Isolated from Aerobic Bottle     Gram Stain Aerobic Bottle Gram negative bacilli    Narrative:      Refer to previous blood culture collected on 04/28/23 for MICs    Blood Culture - Blood, Arm, Right [[id number]]  (Abnormal)  (Susceptibility) Collected: 04/28/23 2134    Lab Status: Final result Specimen: Blood from Arm, Right Updated: 05/01/23 0606     Blood Culture Escherichia coli     Isolated from Aerobic and Anaerobic Bottles     Gram Stain Anaerobic Bottle Gram negative bacilli      Aerobic Bottle Gram negative bacilli    Susceptibility      Escherichia coli      LEA      Ampicillin Resistant     Ampicillin + Sulbactam Intermediate      Cefepime Susceptible      Ceftazidime Susceptible      Ceftriaxone Susceptible      Gentamicin Susceptible      Levofloxacin Susceptible      Piperacillin + Tazobactam Susceptible      Trimethoprim +  Sulfamethoxazole Resistant                      Susceptibility Comments     Escherichia coli    Cefazolin sensitivity will not be reported for Enterobacteriaceae in non-urine isolates. If cefazolin is preferred, please call the microbiology lab to request an E-test.  With the exception of urinary-sourced infections, aminoglycosides should not be used as monotherapy.             Blood Culture ID, PCR - Blood, Arm, Right [[id number]]  (Abnormal) Collected: 04/28/23 2134    Lab Status: Final result Specimen: Blood from Arm, Right Updated: 04/29/23 1137     BCID, PCR Escherichia coli. Identification by BCID2 PCR.     BOTTLE TYPE Anaerobic Bottle    Narrative:      No resistance genes detected.      PSA 0.99      Assessment:    Syncope, unspecified syncope type  1-systemic E. 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     Recommendations/Discussions:  · Please see my discussion and recommendation on 5/1/2023.  · I have requested following in  the nursing communication: Please provide patient with information regarding oral Levaquin for outpatient antibiotic use for his bacteremic E. coli sepsis and if he after reviewing the information agrees to please have pharmacy switch ceftriaxone to oral Levaquin 750 mg p.o. daily for 12 days.  · If patient accepts Levaquin after understanding all the risks side effects and blackbox warning and alternative therapy then patient could be discharged on oral Levaquin from infectious disease standpoint, with close follow-up with urology service.  Cely Joyce MD  5/2/2023  08:16 EDT    Parts of this note  may be an electronic transcription/translation of spoken language to printed text using the Dragon dictation system.

## 2023-05-02 NOTE — NURSING NOTE
Spoke with patient in room and wife, Paula Figueroa, via phone and both in agreement with taking oral Levaquin.

## 2023-05-02 NOTE — PROGRESS NOTES
Hospital Follow Up    LOS:  LOS: 2 days   Patient Name: Danni Figueroa  Age/Sex: 77 y.o. male  : 1945  MRN: [id number]    Day of Service: 23   Length of Stay: 2  Encounter Provider: SCOT Garcia  Place of Service: [facility] CARDIOLOGY  Patient Care Team:  Ya Stallings MD as PCP - General (Internal Medicine)  Katheryn Haider MD as Consulting Physician (Hematology and Oncology)  Ciro Castaneda MD as Referring Physician (General Surgery)    Subjective:     Chief Complaint: syncope    Interval History: No complaints of dizziness or presyncope. No chest pain or shortness of breath. Blood pressure a little elevated.     Objective:     Objective:  Temp:  [97.9 °F (36.6 °C)-98.5 °F (36.9 °C)] 98.1 °F (36.7 °C)  Heart Rate:  [44-55] 51  Resp:  [16-18] 16  BP: (137-158)/(83-99) 147/94     Intake/Output Summary (Last 24 hours) at 2023 0846  Last data filed at 2023 0719  Gross per 24 hour   Intake 1377 ml   Output 1550 ml   Net -173 ml     Body mass index is 26.26 kg/m².      237 23  0500 23  0959   Weight: 88.5 kg (195 lb) 83.4 kg (183 lb 13.8 oz) 83 kg (183 lb)     Weight change:     Physical Exam:   General Appearance:    Awake alert and oriented in no acute distress.   Color:  Skin:  Neuro:  HEENT:    Lungs:     Pink  Warm and dry  No focal, motor or sensory deficits  Neck supple, pupils equal, round and reactive. No JVD, No Bruit  Clear to auscultation,respirations regular, even and                  unlabored    Heart:    Regular rate and rhythm, S1 and S2, no murmur, no gallop, no rub. No edema, DP/PT pulses are 2+   Chest Wall:    No abnormalities observed   Abdomen:     Normal bowel sounds, no masses, no organomegaly, soft        non-tender, non-distended, no guarding, no ascites noted   Extremities:   Moves all extremities well, no edema, no cyanosis, no redness       Lab Review:   Results from last 7 days   Lab  Units 05/02/23  0529 05/01/23  0516 04/30/23  0814 04/29/23  0643 04/27/23  2101   SODIUM mmol/L 143 143   < > 141 139   POTASSIUM mmol/L 4.1 4.0   < > 4.0 4.3   CHLORIDE mmol/L 111* 112*   < > 108* 105   CO2 mmol/L 25.6 24.0   < > 26.2 26.1   BUN mg/dL 12 13   < > 14 17   CREATININE mg/dL 1.33* 1.26   < > 1.27 1.57*   GLUCOSE mg/dL 85 86   < > 112* 168*   CALCIUM mg/dL 9.2 8.3*   < > 8.8 9.5   AST (SGOT) U/L  --   --   --  10 15   ALT (SGPT) U/L  --   --   --  9 12    < > = values in this interval not displayed.     Results from last 7 days   Lab Units 04/29/23  0803 04/29/23  0643 04/27/23 2256 04/27/23  2101   HSTROP T ng/L 45* 43* 33* 26*     Results from last 7 days   Lab Units 05/02/23  0529 05/01/23  0516   WBC 10*3/mm3 5.92 5.54   HEMOGLOBIN g/dL 11.1* 10.9*   HEMATOCRIT % 34.4* 33.3*   PLATELETS 10*3/mm3 257 248     Results from last 7 days   Lab Units 04/27/23  2101   INR  1.10   APTT seconds 24.0     Results from last 7 days   Lab Units 04/27/23  2101   MAGNESIUM mg/dL 2.3     Results from last 7 days   Lab Units 04/29/23  0643   CHOLESTEROL mg/dL 120   TRIGLYCERIDES mg/dL 53   HDL CHOL mg/dL 42     Results from last 7 days   Lab Units 04/27/23  2101   PROBNP pg/mL 171.0     Results from last 7 days   Lab Units 04/29/23  0643   TSH uIU/mL 2.960     I reviewed the patient's new clinical results.  I personally viewed and interpreted the patient's EKG  Current Medications:   Scheduled Meds:cefTRIAXone, 2 g, Intravenous, Q24H  donepezil, 5 mg, Oral, Daily  midodrine, 5 mg, Oral, TID AC  pantoprazole, 40 mg, Oral, Q AM  predniSONE, 1 mg, Oral, Daily With Breakfast      Continuous Infusions:Pharmacy to Dose Zosyn,   sodium chloride, 50 mL/hr, Last Rate: 50 mL/hr (05/01/23 1305)        Allergies:  No Known Allergies    Assessment:       Syncope, unspecified syncope type        Plan:     1. Syncope: likely secondary to orthostatic hypotension exacerbated by UTI and E.coli bacteremia. Echocardiogram unremarkable.  No reported dizziness or presyncopal symptoms today.  2. Orthostatic hypotension: On midodrine 5 mg TID. Blood pressure in 130/140-80-90s. Discussed with Dr. Castañeda. Will continue current dose of midodrine for now and re-evaluate at follow up. Our preference is for his BP to be a little higher due to his orthostasis. May decrease to BID if doing ok as an outpatient. Encouraged use of compression socks.  3. Bradycardia: long standing issue. Heart rate is primarily in the 40s-50s. Will have ZIO placed at discharge.  4. UTI: ID following. Plans for discharge home on antibiotic therapy  5. E. Coli bacteremia: as above in #4    He is stable from a cardiac standpoint to discharge home. Will order ZIO for placement at discharge. He can follow up with me in 2-4 weeks.    SCOT Garcia  05/02/23  08:46 EDT  Electronically signed by SCOT Garcia, 05/02/23, 8:46 AM EDT.

## 2023-05-03 ENCOUNTER — READMISSION MANAGEMENT (OUTPATIENT)
Dept: CALL CENTER | Facility: HOSPITAL | Age: 78
End: 2023-05-03
Payer: MEDICARE

## 2023-05-03 ENCOUNTER — APPOINTMENT (OUTPATIENT)
Dept: CARDIOLOGY | Facility: HOSPITAL | Age: 78
DRG: 872 | End: 2023-05-03
Payer: MEDICARE

## 2023-05-03 VITALS
OXYGEN SATURATION: 97 % | SYSTOLIC BLOOD PRESSURE: 154 MMHG | HEART RATE: 56 BPM | RESPIRATION RATE: 16 BRPM | TEMPERATURE: 98.2 F | DIASTOLIC BLOOD PRESSURE: 88 MMHG | BODY MASS INDEX: 26.2 KG/M2 | HEIGHT: 70 IN | WEIGHT: 183 LBS

## 2023-05-03 LAB
ANION GAP SERPL CALCULATED.3IONS-SCNC: 9.4 MMOL/L (ref 5–15)
BASOPHILS # BLD AUTO: 0.03 10*3/MM3 (ref 0–0.2)
BASOPHILS NFR BLD AUTO: 0.5 % (ref 0–1.5)
BUN SERPL-MCNC: 12 MG/DL (ref 8–23)
BUN/CREAT SERPL: 10.1 (ref 7–25)
CALCIUM SPEC-SCNC: 9.2 MG/DL (ref 8.6–10.5)
CHLORIDE SERPL-SCNC: 110 MMOL/L (ref 98–107)
CO2 SERPL-SCNC: 26.6 MMOL/L (ref 22–29)
CREAT SERPL-MCNC: 1.19 MG/DL (ref 0.76–1.27)
DEPRECATED RDW RBC AUTO: 38.3 FL (ref 37–54)
EGFRCR SERPLBLD CKD-EPI 2021: 63.7 ML/MIN/1.73
EOSINOPHIL # BLD AUTO: 0.15 10*3/MM3 (ref 0–0.4)
EOSINOPHIL NFR BLD AUTO: 2.7 % (ref 0.3–6.2)
ERYTHROCYTE [DISTWIDTH] IN BLOOD BY AUTOMATED COUNT: 13.5 % (ref 12.3–15.4)
GLUCOSE SERPL-MCNC: 81 MG/DL (ref 65–99)
HCT VFR BLD AUTO: 34.5 % (ref 37.5–51)
HGB BLD-MCNC: 11.3 G/DL (ref 13–17.7)
IMM GRANULOCYTES # BLD AUTO: 0.02 10*3/MM3 (ref 0–0.05)
IMM GRANULOCYTES NFR BLD AUTO: 0.4 % (ref 0–0.5)
LYMPHOCYTES # BLD AUTO: 1.29 10*3/MM3 (ref 0.7–3.1)
LYMPHOCYTES NFR BLD AUTO: 23.3 % (ref 19.6–45.3)
MAXIMAL PREDICTED HEART RATE: 145 BPM
MCH RBC QN AUTO: 25.7 PG (ref 26.6–33)
MCHC RBC AUTO-ENTMCNC: 32.8 G/DL (ref 31.5–35.7)
MCV RBC AUTO: 78.6 FL (ref 79–97)
MONOCYTES # BLD AUTO: 0.38 10*3/MM3 (ref 0.1–0.9)
MONOCYTES NFR BLD AUTO: 6.9 % (ref 5–12)
NEUTROPHILS NFR BLD AUTO: 3.67 10*3/MM3 (ref 1.7–7)
NEUTROPHILS NFR BLD AUTO: 66.2 % (ref 42.7–76)
NRBC BLD AUTO-RTO: 0 /100 WBC (ref 0–0.2)
PLATELET # BLD AUTO: 272 10*3/MM3 (ref 140–450)
PMV BLD AUTO: 9.8 FL (ref 6–12)
POTASSIUM SERPL-SCNC: 4 MMOL/L (ref 3.5–5.2)
RBC # BLD AUTO: 4.39 10*6/MM3 (ref 4.14–5.8)
SODIUM SERPL-SCNC: 146 MMOL/L (ref 136–145)
STRESS TARGET HR: 123 BPM
WBC NRBC COR # BLD: 5.54 10*3/MM3 (ref 3.4–10.8)

## 2023-05-03 PROCEDURE — 99232 SBSQ HOSP IP/OBS MODERATE 35: CPT | Performed by: NURSE PRACTITIONER

## 2023-05-03 PROCEDURE — 85025 COMPLETE CBC W/AUTO DIFF WBC: CPT | Performed by: HOSPITALIST

## 2023-05-03 PROCEDURE — 63710000001 PREDNISONE PER 5 MG: Performed by: HOSPITALIST

## 2023-05-03 PROCEDURE — 80048 BASIC METABOLIC PNL TOTAL CA: CPT | Performed by: HOSPITALIST

## 2023-05-03 PROCEDURE — 93246 EXT ECG>7D<15D RECORDING: CPT

## 2023-05-03 RX ORDER — MIDODRINE HYDROCHLORIDE 2.5 MG/1
2.5 TABLET ORAL
Status: DISCONTINUED | OUTPATIENT
Start: 2023-05-03 | End: 2023-05-03 | Stop reason: HOSPADM

## 2023-05-03 RX ORDER — MIDODRINE HYDROCHLORIDE 2.5 MG/1
2.5 TABLET ORAL
Qty: 60 TABLET | Refills: 0 | Status: SHIPPED | OUTPATIENT
Start: 2023-05-03 | End: 2023-06-02

## 2023-05-03 RX ORDER — LEVOFLOXACIN 750 MG/1
750 TABLET ORAL EVERY 24 HOURS
Qty: 11 TABLET | Refills: 0 | Status: SHIPPED | OUTPATIENT
Start: 2023-05-04 | End: 2023-05-15

## 2023-05-03 RX ORDER — PANTOPRAZOLE SODIUM 40 MG/1
40 TABLET, DELAYED RELEASE ORAL
Qty: 30 TABLET | Refills: 0 | Status: SHIPPED | OUTPATIENT
Start: 2023-05-04 | End: 2023-06-03

## 2023-05-03 RX ADMIN — DONEPEZIL HYDROCHLORIDE 5 MG: 5 TABLET, FILM COATED ORAL at 08:40

## 2023-05-03 RX ADMIN — LEVOFLOXACIN 750 MG: 750 TABLET, FILM COATED ORAL at 06:54

## 2023-05-03 RX ADMIN — PREDNISONE 1 MG: 1 TABLET ORAL at 08:40

## 2023-05-03 RX ADMIN — PANTOPRAZOLE SODIUM 40 MG: 40 TABLET, DELAYED RELEASE ORAL at 06:54

## 2023-05-03 NOTE — CASE MANAGEMENT/SOCIAL WORK
Case Management Discharge Note      Final Note: Patient discharging home with spouse. Patient ambulated 75ft; PT/OT signed off. Per ID MD, discharged on PO antibiotics. No further discharge needs identified. Spouse to transport at discharge. Jennifer TEJADA LCSW         Selected Continued Care - Admitted Since 4/27/2023     Destination    No services have been selected for the patient.              Durable Medical Equipment    No services have been selected for the patient.              Dialysis/Infusion    No services have been selected for the patient.              Home Medical Care    No services have been selected for the patient.              Therapy    No services have been selected for the patient.              Community Resources    No services have been selected for the patient.              Community & DME    No services have been selected for the patient.                  Transportation Services  Private: Car    Final Discharge Disposition Code: 01 - home or self-care

## 2023-05-03 NOTE — DISCHARGE SUMMARY
Discharge summary    Date of admission 4/27/2023  Date of discharge 5/3/2023    Final diagnosis  Acute E. coli UTI with sepsis  E. coli bacteremia with sepsis  Syncopal episode secondary to hypotension  Sinus bradycardia  Severe orthostatic hypotension  Polymyalgia rheumatica  Prostate cancer  Appendiceal mucinous neoplasm s/p right hemicolectomy  Dementia  History of alcohol abuse    Discharge medications    Current Facility-Administered Medications:   •  donepezil (ARICEPT) tablet 5 mg, 5 mg, Oral, Daily, Derrick Brothers MD, 5 mg at 05/03/23 0840  •  levoFLOXacin (LEVAQUIN) tablet 750 mg, 750 mg, Oral, Q24H, Cely Joyce MD, 750 mg at 05/03/23 0654  •  midodrine (PROAMATINE) tablet 2.5 mg, 2.5 mg, Oral, BID AC, Tea Mcfadden, APRN  •  pantoprazole (PROTONIX) EC tablet 40 mg, 40 mg, Oral, Q AM, Derrick Brothers MD, 40 mg at 05/03/23 0654  •  predniSONE (DELTASONE) tablet 1 mg, 1 mg, Oral, Daily With Breakfast, Derrick Brothers MD, 1 mg at 05/03/23 0840  •  [COMPLETED] Insert Peripheral IV, , , Once **AND** sodium chloride 0.9 % flush 10 mL, 10 mL, Intravenous, PRN, Fei Clark MD     Consults obtained  Cardiology  Infectious disease  Nutrition    Procedures  None    Hospital course  77-year-old -American male with history of severe orthostatic hypotension prostate cancer appendiceal mucinous neoplasm s/p right hemicolectomy admitted to emergency room with syncopal episode a day prior to admission.  Patient admitted for management.  Patient found to be hypotensive received IV fluid supportive care and further evaluated by cardiology.  Patient also have sinus bradycardia which cardiology recommend no further work-up at this time discharged on Zio patch.  During hospitalization patient developed fever and work-up revealed acute UTI with E. coli and also blood cultures came back positive for E. coli.  Patient treated with IV antibiotics and further evaluated by infectious disease and recommend 2 weeks of  antibiotics.  Patient repeated blood cultures are negative and is started on oral Levaquin which she can finish at home.  Patient blood pressure remained stable after starting the midodrine and he is doing better cleared for discharge from cardiology and infectious disease.    Discharge diet regular    Activity as tolerated    Medication as above    Follow-up with prime doctor in 1 week and follow-up with cardiology and infectious disease per the instructions and take medication as directed.    KEN MCFARLANE MD

## 2023-05-03 NOTE — PROGRESS NOTES
Nutrition Services    Patient Name:  Danni Figueroa  YOB: 1945  MRN: [id number]  Admit Date:  4/27/2023    Assessment Date:  05/03/23    CLINICAL NUTRITION - PROGRESS NOTE       Reason for Encounter Follow-up         Nutrition Order Diet: Regular/House Diet; Texture: Regular Texture (IDDSI 7); Fluid Consistency: Thin (IDDSI 0)    Supplements/Snacks     EN/PN Order          Pertinent Information No intakes recorded. Patient was found to have e.coli s/t UTI. He is feeling better, afebrile. Ready to go home-- d/c summary in         Intervention/Plan Will continue to follow     RD to follow up per protocol.    Electronically signed by:  Carin Martin RD  05/03/23 15:05 EDT

## 2023-05-03 NOTE — PLAN OF CARE
Goal Outcome Evaluation:  Plan of Care Reviewed With: patient        Progress: no change  Outcome Evaluation: No changes from nursing standpoint.        Problem: Adult Inpatient Plan of Care  Goal: Plan of Care Review  Outcome: Ongoing, Progressing  Flowsheets (Taken 5/3/2023 1042)  Progress: no change  Plan of Care Reviewed With: patient  Outcome Evaluation: No changes from nursing standpoint.  Goal: Patient-Specific Goal (Individualized)  Outcome: Ongoing, Progressing  Goal: Absence of Hospital-Acquired Illness or Injury  Outcome: Ongoing, Progressing  Goal: Optimal Comfort and Wellbeing  Outcome: Ongoing, Progressing  Goal: Readiness for Transition of Care  Outcome: Ongoing, Progressing     Problem: Fall Injury Risk  Goal: Absence of Fall and Fall-Related Injury  Outcome: Ongoing, Progressing     Problem: Hypertension Comorbidity  Goal: Blood Pressure in Desired Range  Outcome: Ongoing, Progressing     Problem: Syncope  Goal: Absence of Syncopal Symptoms  Outcome: Ongoing, Progressing

## 2023-05-03 NOTE — PROGRESS NOTES
"Daily progress note    Primary care physician   Not known    Chief complaint  Doing better with no new complaints and wants to go home.  Patient family at bedside.    History of present illness  77-year-old -American male with history of severe orthostatic hypotension polymyalgia rheumatica prostate cancer and appendiceal mucinous neoplasm status post right hemicolectomy presented to [facility] emergency room after syncopal episode yesterday evening.  Patient stated that he has been feeling dizzy lightheaded and fell on the ground but did not lose any consciousness.  Patient denies any chest pain shortness of breath palpitation abdominal pain nausea vomiting diarrhea.  Patient evaluated in ER found to be hypotensive admitted for management.  At the time of interview he is awake and alert and answers all question appropriately and give me a detailed history and daughter also contributed.  Patient has no complaint at the time of examination.     REVIEW OF SYSTEMS  Unremarkable      PHYSICAL EXAM   Blood pressure 154/88, pulse 56, temperature 98.2 °F (36.8 °C), temperature source Oral, resp. rate 16, height 177.8 cm (70\"), weight 83 kg (183 lb), SpO2 97 %.    GENERAL: alert, no acute distress  SKIN: Warm, dry  HEENT:  Unremarkable  NECK:  Supple  CV: regular rhythm, regular rate  RESPIRATORY: normal effort, moving air bilaterally  ABDOMEN: soft, nontender, nondistended bowel sounds positive  MUSCULOSKELETAL: no deformity  NEURO: alert, moves all extremities, follows commands     LAB RESULTS  Lab Results (last 24 hours)     Procedure Component Value Units Date/Time    Basic Metabolic Panel [[id number]]  (Abnormal) Collected: 05/03/23 0623    Specimen: Blood Updated: 05/03/23 0736     Glucose 81 mg/dL      BUN 12 mg/dL      Creatinine 1.19 mg/dL      Sodium 146 mmol/L      Potassium 4.0 mmol/L      Chloride 110 mmol/L      CO2 26.6 mmol/L      Calcium 9.2 mg/dL      BUN/Creatinine Ratio 10.1     Anion " Gap 9.4 mmol/L      eGFR 63.7 mL/min/1.73     Narrative:      GFR Normal >60  Chronic Kidney Disease <60  Kidney Failure <15    The GFR formula is only valid for adults with stable renal function between ages 18 and 70.    CBC & Differential [[id number]]  (Abnormal) Collected: 05/03/23 0623    Specimen: Blood Updated: 05/03/23 0724    Narrative:      The following orders were created for panel order CBC & Differential.  Procedure                               Abnormality         Status                     ---------                               -----------         ------                     CBC Auto Differential[[id number]]        Abnormal            Final result                 Please view results for these tests on the individual orders.    CBC Auto Differential [[id number]]  (Abnormal) Collected: 05/03/23 0623    Specimen: Blood Updated: 05/03/23 0724     WBC 5.54 10*3/mm3      RBC 4.39 10*6/mm3      Hemoglobin 11.3 g/dL      Hematocrit 34.5 %      MCV 78.6 fL      MCH 25.7 pg      MCHC 32.8 g/dL      RDW 13.5 %      RDW-SD 38.3 fl      MPV 9.8 fL      Platelets 272 10*3/mm3      Neutrophil % 66.2 %      Lymphocyte % 23.3 %      Monocyte % 6.9 %      Eosinophil % 2.7 %      Basophil % 0.5 %      Immature Grans % 0.4 %      Neutrophils, Absolute 3.67 10*3/mm3      Lymphocytes, Absolute 1.29 10*3/mm3      Monocytes, Absolute 0.38 10*3/mm3      Eosinophils, Absolute 0.15 10*3/mm3      Basophils, Absolute 0.03 10*3/mm3      Immature Grans, Absolute 0.02 10*3/mm3      nRBC 0.0 /100 WBC     Blood Culture - Blood, Arm, Left [[id number]]  (Normal) Collected: 04/30/23 1512    Specimen: Blood from Arm, Left Updated: 05/02/23 1531     Blood Culture No growth at 2 days    Blood Culture - Blood, Arm, Left [[id number]]  (Normal) Collected: 04/30/23 1513    Specimen: Blood from Arm, Left Updated: 05/02/23 1531     Blood Culture No growth at 2 days        Imaging Results (Last 24 Hours)     ** No results found for the last 24 hours.  **        ECG 12 Lead       Component  Ref Range & Units 1 d ago   QT Interval  ms 318    Resulting Agency  ECG             HEART RATE= 56  bpm  RR Interval= 1071  ms  ME Interval= 184  ms  P Horizontal Axis= 16  deg  P Front Axis= -19  deg  QRSD Interval= 109  ms  QT Interval= 318  ms  QRS Axis= -18  deg  T Wave Axis= -25  deg  - ABNORMAL ECG -  Sinus rhythm  Atrial premature complexes  Borderline left axis deviation  Borderline low voltage, extremity leads  PACs are new               Current Facility-Administered Medications:   •  donepezil (ARICEPT) tablet 5 mg, 5 mg, Oral, Daily, Ken Brothers MD, 5 mg at 05/03/23 0840  •  levoFLOXacin (LEVAQUIN) tablet 750 mg, 750 mg, Oral, Q24H, Cely Joyce MD, 750 mg at 05/03/23 0654  •  meclizine (ANTIVERT) tablet 25 mg, 25 mg, Oral, TID PRN, Ken Brothers MD  •  midodrine (PROAMATINE) tablet 2.5 mg, 2.5 mg, Oral, BID AC, Tea Mcfadden, APRN  •  pantoprazole (PROTONIX) EC tablet 40 mg, 40 mg, Oral, Q AM, Ken Brothers MD, 40 mg at 05/03/23 0654  •  predniSONE (DELTASONE) tablet 1 mg, 1 mg, Oral, Daily With Breakfast, Ken Brothers MD, 1 mg at 05/03/23 0840  •  [COMPLETED] Insert Peripheral IV, , , Once **AND** sodium chloride 0.9 % flush 10 mL, 10 mL, Intravenous, PRN, Fei Clark MD     ASSESSMENT  Acute E. coli UTI with sepsis  E. coli bacteremia with sepsis  Syncopal episode secondary to hypotension  Sinus bradycardia,  Severe orthostatic hypotension  Polymyalgia rheumatica  Prostate cancer  Appendiceal mucinous neoplasm s/p right hemicolectomy  Dementia  History of alcohol abuse    PLAN  Discharge home on oral antibiotics for 12 more days  Discharge summary dictated    KEN BROTHERS MD    Copied text in this note has been reviewed and is accurate as of 05/03/23

## 2023-05-03 NOTE — PROGRESS NOTES
Hospital Follow Up    LOS:  LOS: 3 days   Patient Name: Danni Figueroa  Age/Sex: 77 y.o. male  : 1945  MRN: [id number]    Day of Service: 23   Length of Stay: 3  Encounter Provider: SCOT Garcia  Place of Service: [facility]  Patient Care Team:  Ya Stallings MD as PCP - General (Internal Medicine)  Katheryn Haider MD as Consulting Physician (Hematology and Oncology)  Ciro Castaneda MD as Referring Physician (General Surgery)    Subjective:     Chief Complaint: syncope    Interval History: No complaints of dizziness or presyncope. No chest pain or shortness of breath. Blood pressure very elevated now.    Objective:     Objective:  Temp:  [97 °F (36.1 °C)-98.2 °F (36.8 °C)] 97 °F (36.1 °C)  Heart Rate:  [52-64] 57  Resp:  [16-18] 16  BP: (122-178)/() 153/93     Intake/Output Summary (Last 24 hours) at 5/3/2023 0757  Last data filed at 5/3/2023 0651  Gross per 24 hour   Intake --   Output 1150 ml   Net -1150 ml     Body mass index is 26.26 kg/m².      237 23  0500 23  0959   Weight: 88.5 kg (195 lb) 83.4 kg (183 lb 13.8 oz) 83 kg (183 lb)     Weight change:     Physical Exam:   General Appearance:    Awake alert and oriented in no acute distress.   Color:  Skin:  Neuro:  HEENT:    Lungs:     Pink  Warm and dry  No focal, motor or sensory deficits  Neck supple, pupils equal, round and reactive. No JVD, No Bruit  Clear to auscultation,respirations regular, even and                  unlabored    Heart:    Regular rate and rhythm, S1 and S2, no murmur, no gallop, no rub. No edema, DP/PT pulses are 2+   Chest Wall:    No abnormalities observed   Abdomen:     Normal bowel sounds, no masses, no organomegaly, soft        non-tender, non-distended, no guarding, no ascites noted   Extremities:   Moves all extremities well, no edema, no cyanosis, no redness       Lab Review:   Results from last 7 days   Lab Units  05/03/23  0623 05/02/23  0529 04/30/23  0814 04/29/23  0643 04/27/23  2101   SODIUM mmol/L 146* 143   < > 141 139   POTASSIUM mmol/L 4.0 4.1   < > 4.0 4.3   CHLORIDE mmol/L 110* 111*   < > 108* 105   CO2 mmol/L 26.6 25.6   < > 26.2 26.1   BUN mg/dL 12 12   < > 14 17   CREATININE mg/dL 1.19 1.33*   < > 1.27 1.57*   GLUCOSE mg/dL 81 85   < > 112* 168*   CALCIUM mg/dL 9.2 9.2   < > 8.8 9.5   AST (SGOT) U/L  --   --   --  10 15   ALT (SGPT) U/L  --   --   --  9 12    < > = values in this interval not displayed.     Results from last 7 days   Lab Units 04/29/23  0803 04/29/23  0643 04/27/23  2256 04/27/23  2101   HSTROP T ng/L 45* 43* 33* 26*     Results from last 7 days   Lab Units 05/03/23  0623 05/02/23  0529   WBC 10*3/mm3 5.54 5.92   HEMOGLOBIN g/dL 11.3* 11.1*   HEMATOCRIT % 34.5* 34.4*   PLATELETS 10*3/mm3 272 257     Results from last 7 days   Lab Units 04/27/23  2101   INR  1.10   APTT seconds 24.0     Results from last 7 days   Lab Units 04/27/23  2101   MAGNESIUM mg/dL 2.3     Results from last 7 days   Lab Units 04/29/23  0643   CHOLESTEROL mg/dL 120   TRIGLYCERIDES mg/dL 53   HDL CHOL mg/dL 42     Results from last 7 days   Lab Units 04/27/23  2101   PROBNP pg/mL 171.0     Results from last 7 days   Lab Units 04/29/23  0643   TSH uIU/mL 2.960     I reviewed the patient's new clinical results.  I personally viewed and interpreted the patient's EKG  Current Medications:   Scheduled Meds:donepezil, 5 mg, Oral, Daily  levoFLOXacin, 750 mg, Oral, Q24H  midodrine, 2.5 mg, Oral, BID AC  pantoprazole, 40 mg, Oral, Q AM  predniSONE, 1 mg, Oral, Daily With Breakfast      Continuous Infusions:     Allergies:  No Known Allergies    Assessment:       Syncope, unspecified syncope type        Plan:     1. Syncope: likely secondary to orthostatic hypotension exacerbated by UTI and E.coli bacteremia. Echocardiogram unremarkable. No reported dizziness or presyncopal symptoms today.  2. Orthostatic hypotension: On midodrine 5  mg TID. Blood pressure elevated. Will decrease midodrine to 2.5 mg BID. Encouraged use of compression socks.  3. Bradycardia: long standing issue. Heart rate is primarily in the 40s-50s. Will have ZIO placed at discharge.  4. UTI: ID following. Plans for discharge home on antibiotic therapy  5. E. Coli bacteremia: as above in #4    Blood pressure has increased-will decrease midodrine. Ok with BP being around 140/80.   ZIO at discharge    SCOT Garcia  05/03/23  07:57 EDT  Electronically signed by SCOT Garcia, 05/02/23, 8:46 AM EDT.

## 2023-05-04 ENCOUNTER — HOME HEALTH ADMISSION (OUTPATIENT)
Dept: HOME HEALTH SERVICES | Facility: HOME HEALTHCARE | Age: 78
End: 2023-05-04
Payer: MEDICARE

## 2023-05-04 NOTE — OUTREACH NOTE
Prep Survey    Flowsheet Row Responses   [facility] facility patient discharged from? [facility]   Is LACE score < 7 ? No   Eligibility Readm Mgmt   Discharge diagnosis Syncope,   Acute E. coli UTI with sepsis   Does the patient have one of the following disease processes/diagnoses(primary or secondary)? Sepsis   Is there a DME ordered? No   Prep survey completed? Yes          Jeanette UMANA - Registered Nurse

## 2023-05-05 LAB
BACTERIA SPEC AEROBE CULT: NORMAL
BACTERIA SPEC AEROBE CULT: NORMAL

## 2023-05-16 ENCOUNTER — READMISSION MANAGEMENT (OUTPATIENT)
Dept: CALL CENTER | Facility: HOSPITAL | Age: 78
End: 2023-05-16
Payer: MEDICARE

## 2023-05-16 NOTE — OUTREACH NOTE
Sepsis Week 3 Survey    Flowsheet Row Responses   [facility] facility patient discharged from? [facility]   Does the patient have one of the following disease processes/diagnoses(primary or secondary)? Sepsis   Week 3 attempt successful? No   Unsuccessful attempts Attempt 1          Bailee Toney Licensed Nurse

## 2023-05-19 ENCOUNTER — READMISSION MANAGEMENT (OUTPATIENT)
Dept: CALL CENTER | Facility: HOSPITAL | Age: 78
End: 2023-05-19
Payer: MEDICARE

## 2023-05-19 NOTE — OUTREACH NOTE
Sepsis Week 3 Survey    Flowsheet Row Responses   [facility] facility patient discharged from? [place]   Does the patient have one of the following disease processes/diagnoses(primary or secondary)? Sepsis   Week 3 attempt successful? No   Unsuccessful attempts Attempt 2          SENAIT MCALLISTER - Registered Nurse

## 2023-05-24 LAB
MAXIMAL PREDICTED HEART RATE: 145 BPM
STRESS TARGET HR: 123 BPM

## 2023-05-25 ENCOUNTER — OFFICE VISIT (OUTPATIENT)
Dept: CARDIOLOGY | Facility: CLINIC | Age: 78
End: 2023-05-25
Payer: MEDICARE

## 2023-05-25 VITALS
DIASTOLIC BLOOD PRESSURE: 80 MMHG | HEIGHT: 70 IN | WEIGHT: 185.2 LBS | BODY MASS INDEX: 26.51 KG/M2 | HEART RATE: 52 BPM | SYSTOLIC BLOOD PRESSURE: 130 MMHG

## 2023-05-25 DIAGNOSIS — R00.1 BRADYCARDIA, SINUS: ICD-10-CM

## 2023-05-25 DIAGNOSIS — R55 SYNCOPE AND COLLAPSE: Primary | ICD-10-CM

## 2023-05-25 NOTE — PROGRESS NOTES
Subjective:     Encounter Date:05/25/2023      Patient ID: Danni Figueroa is a 77 y.o. male.    Chief Complaint: Bradycardia  History of Present Illness  This is a 75 y/o man who follows with Dr. Castañeda and I have seen in the past. 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     He was recently admitted in April for a syncopal event. He was found to be hypotensive and received fluid resuscitation. During his hospitalization, he developed a fever and workup revealed an acute UTI with E.Coli. He was treated with IV antibiotics and sent home with oral Levaquin. He was started on midodrine for hypotension and this initially had to be titrated up to 5 mg TID. We then began having issues with hypertension and ultimately he was on 2.5 mg BID at discharge.    He is here today for a follow up visit. He has been doing well since his hospitalization. He initially took the midodrine BID. However, he began having significantly elevated blood pressures throughout the day. So, he is now taking midodrine once a day, typically around midnight. This seems to have helped maintain a more appropriate blood pressure reading. He says that when he tried taking it in the morning, his blood pressure would be too high during the day. And if he took it in the evening, it would not help his BP the following day. So, when he gets up at midnight to use the restroom, he takes his dose and this seems to be working for him. He still has occasional episodes of dizziness and plans to get his meclizine refilled. No further syncopal events reported. He denies any chest pain, shortness of breath, palpitations, swelling in his legs, orthopnea or PND. He walks for exercise 3 1/2 miles several days a week.    Prior history:  He was initially seen here in the office by Dr. Castañeda for and EKG performed at his PCP's office  showing a heart rate of 48 bpm and inferior lateral borderline T wave changes. He was asymptomatic with his bradycardia. He had no complaints of anginal symptoms warranting any invasive testing for the T wave abnormalities noted on his EKG. He underwent an echocardiogram on 8/10/21 demonstrating an EF of 61-65% with no significant valvular heart disease.    I saw him in March 2022 and he had just experienced a syncopal event at that time. I placed an ambulatory monitor on him that did not show any significant arrhythmias. He then saw Dr. Castañeda in September and was feeling well. No changes were made.     I then saw him in March 2023 and he was still doing well. He was taking PRN fludrocortisone to take for hypotension and he was taking it about every other day. No changes were made and he was to follow up in 6 months.    I have reviewed and updated as appropriate allergies, current medications, past family history, past medical history, past surgical history and problem list.    Review of Systems   Constitutional: Negative for fever, malaise/fatigue, weight gain and weight loss.   HENT: Negative for congestion, hoarse voice and sore throat.    Eyes: Negative for blurred vision and double vision.   Cardiovascular: Negative for chest pain, dyspnea on exertion, leg swelling, orthopnea, palpitations and syncope.   Respiratory: Negative for cough, shortness of breath and wheezing.    Gastrointestinal: Negative for abdominal pain, hematemesis, hematochezia and melena.   Genitourinary: Negative for dysuria and hematuria.   Neurological: Positive for dizziness. Negative for headaches, light-headedness and numbness.   Psychiatric/Behavioral: Negative for depression and memory loss. The patient is not nervous/anxious.          Current Outpatient Medications:   •  donepezil (ARICEPT) 5 MG tablet, Take 1 tablet by mouth Daily., Disp: , Rfl:   •  fludrocortisone 0.1 MG tablet, TAKE 1 TABLET BY MOUTH ONCE A DAY TO PREVENT PASSING  "OUT AND LOW BLOOD PRESSURE, Disp: , Rfl:   •  meclizine (ANTIVERT) 25 MG tablet, Take 1 tablet by mouth 3 (Three) Times a Day As Needed., Disp: , Rfl:   •  midodrine (PROAMATINE) 2.5 MG tablet, Take 1 tablet by mouth 2 (Two) Times a Day Before Meals for 30 days., Disp: 60 tablet, Rfl: 0  •  pantoprazole (PROTONIX) 40 MG EC tablet, Take 1 tablet by mouth Every Morning for 30 days., Disp: 30 tablet, Rfl: 0  •  predniSONE (DELTASONE) 1 MG tablet, Take 1 tablet by mouth Daily., Disp: , Rfl:   •  tadalafil (CIALIS) 5 MG tablet, Take 1 tablet by mouth Daily., Disp: , Rfl:     Past Medical History:   Diagnosis Date   • Diabetes mellitus    • History of alcohol abuse    • Hypertension    • Low grade mucinous neoplasm of appendix    • Peripheral vertigo    • Prostate carcinoma        Past Surgical History:   Procedure Laterality Date   • APPENDECTOMY  1993   • COLON SURGERY  06/2014    Right hemicolectomy-Pericecal mass   • COLONOSCOPY     • PROSTATE SURGERY  2014       Family History   Problem Relation Age of Onset   • Prostate cancer Brother    • Heart failure Brother    • Diabetes Other    • Heart failure Brother    • Heart failure Son        Social History     Tobacco Use   • Smoking status: Former     Packs/day: 1.00     Years: 10.00     Pack years: 10.00     Types: Cigarettes   • Smokeless tobacco: Never   • Tobacco comments:     caffeine use   Vaping Use   • Vaping Use: Never used   Substance Use Topics   • Alcohol use: No     Comment: 29 year former alcoholic    • Drug use: No         ECG 12 Lead    Date/Time: 5/25/2023 11:52 AM  Performed by: Tea Mcfadden APRN  Authorized by: Tea Mcfadden APRN   Comparison: compared with previous ECG from 4/29/2023  Similar to previous ECG  Rhythm: sinus bradycardia  BPM: 52  Comments: No significant change from previous EKG               Objective:     Visit Vitals  /80   Pulse 52   Ht 177 cm (69.69\")   Wt 84 kg (185 lb 3.2 oz)   BMI 26.81 kg/m²             Physical " Exam  Constitutional:       Appearance: Normal appearance. He is normal weight.   HENT:      Head: Normocephalic and atraumatic.   Neck:      Vascular: No carotid bruit.   Cardiovascular:      Rate and Rhythm: Normal rate and regular rhythm.      Chest Wall: PMI is not displaced.      Pulses: Normal pulses.           Radial pulses are 2+ on the right side and 2+ on the left side.        Posterior tibial pulses are 2+ on the right side and 2+ on the left side.      Heart sounds: S1 normal and S2 normal. No murmur heard.    No friction rub. No gallop.   Pulmonary:      Effort: Pulmonary effort is normal.      Breath sounds: Normal breath sounds.   Abdominal:      General: Bowel sounds are normal. There is no distension.      Palpations: Abdomen is soft.   Musculoskeletal:      Right lower leg: No edema.      Left lower leg: No edema.   Skin:     General: Skin is warm and dry.      Capillary Refill: Capillary refill takes less than 2 seconds.   Neurological:      Mental Status: He is alert and oriented to person, place, and time.   Psychiatric:         Mood and Affect: Mood normal.         Behavior: Behavior normal.         Thought Content: Thought content normal.          Lab Review:   Lipid Panel        4/29/2023    06:43   Lipid Panel   Total Cholesterol 120     Triglycerides 53     HDL Cholesterol 42     VLDL Cholesterol 12     LDL Cholesterol  66     LDL/HDL Ratio 1.60           Cardiac Procedures:   1.     Assessment:         Diagnoses and all orders for this visit:    1. Syncope and collapse (Primary)    2. Bradycardia, sinus  -     ECG 12 Lead            Plan:       1. Syncope and collapse secondary to orthostatic hypotension: No recurrent episodes. EKG is stable today. Blood pressure is stable on once daily dose of midodrine. Will continue.   2. H/o bradycardia: no further bradycardic events reported. In sinus rhythm on EKG today. No changes.  3. PACs: asymptomatic. Will monitor.    Thank you for allowing me  to participate in this patient's care. Please call with any questions or concerns. Mr. Figueroa will follow up with Dr. Castañeda in 3 months.       Your medication list          Accurate as of May 25, 2023 11:52 AM. If you have any questions, ask your nurse or doctor.            CONTINUE taking these medications      Instructions Last Dose Given Next Dose Due   donepezil 5 MG tablet  Commonly known as: ARICEPT      Take 1 tablet by mouth Daily.       fludrocortisone 0.1 MG tablet      TAKE 1 TABLET BY MOUTH ONCE A DAY TO PREVENT PASSING OUT AND LOW BLOOD PRESSURE       meclizine 25 MG tablet  Commonly known as: ANTIVERT      Take 1 tablet by mouth 3 (Three) Times a Day As Needed.       midodrine 2.5 MG tablet  Commonly known as: PROAMATINE      Take 1 tablet by mouth 2 (Two) Times a Day Before Meals for 30 days.       pantoprazole 40 MG EC tablet  Commonly known as: PROTONIX      Take 1 tablet by mouth Every Morning for 30 days.       predniSONE 1 MG tablet  Commonly known as: DELTASONE      Take 1 tablet by mouth Daily.       tadalafil 5 MG tablet  Commonly known as: CIALIS      Take 1 tablet by mouth Daily.                Tea Mcfadden, SCOT  05/25/23  11:24 AM EDT

## 2023-05-31 LAB
MAXIMAL PREDICTED HEART RATE: 145 BPM
STRESS TARGET HR: 123 BPM

## 2023-06-01 ENCOUNTER — TELEPHONE (OUTPATIENT)
Dept: CARDIOLOGY | Facility: CLINIC | Age: 78
End: 2023-06-01

## 2023-06-01 NOTE — TELEPHONE ENCOUNTER
Notified patient of results, patient verbalizes understanding.    Renetta Damon RN  [facility] Triage  06/01/23 11:45 EDT

## 2023-06-01 NOTE — TELEPHONE ENCOUNTER
----- Message from SCOT Garcia sent at 6/1/2023  8:23 AM EDT -----  Please let patient know his monitor did not show any concerning abnormal rhythms.

## 2023-06-01 NOTE — TELEPHONE ENCOUNTER
I tried to call Danni Figueroa but there was no answer.  Left a voicemail asking patient to call back.  Will continue to try to reach pt.    Thank you,    Taylor Rodriguez RN  Triage [facility]  06/01/23 10:07 EDT

## 2023-09-12 ENCOUNTER — OFFICE VISIT (OUTPATIENT)
Dept: CARDIOLOGY | Facility: CLINIC | Age: 78
End: 2023-09-12
Payer: MEDICARE

## 2023-09-12 VITALS
WEIGHT: 189.2 LBS | HEART RATE: 53 BPM | BODY MASS INDEX: 27.09 KG/M2 | SYSTOLIC BLOOD PRESSURE: 122 MMHG | DIASTOLIC BLOOD PRESSURE: 70 MMHG | OXYGEN SATURATION: 97 % | HEIGHT: 70 IN

## 2023-09-12 DIAGNOSIS — M35.3 POLYMYALGIA RHEUMATICA: ICD-10-CM

## 2023-09-12 DIAGNOSIS — R55 SYNCOPE AND COLLAPSE: Primary | ICD-10-CM

## 2023-09-12 DIAGNOSIS — R00.1 BRADYCARDIA, SINUS: ICD-10-CM

## 2023-09-12 DIAGNOSIS — I95.1 ORTHOSTATIC HYPOTENSION: ICD-10-CM

## 2023-09-12 PROCEDURE — 1159F MED LIST DOCD IN RCRD: CPT | Performed by: INTERNAL MEDICINE

## 2023-09-12 PROCEDURE — 1160F RVW MEDS BY RX/DR IN RCRD: CPT | Performed by: INTERNAL MEDICINE

## 2023-09-12 PROCEDURE — 99214 OFFICE O/P EST MOD 30 MIN: CPT | Performed by: INTERNAL MEDICINE

## 2023-09-12 PROCEDURE — 93000 ELECTROCARDIOGRAM COMPLETE: CPT | Performed by: INTERNAL MEDICINE

## 2023-09-12 NOTE — PROGRESS NOTES
Subjective:     Encounter Date:09/12/2023      Patient ID: Danni Figueroa is a 77 y.o. male.    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  Patient's episode occurred about 2 weeks prior.  He reports that he was getting ready in his bathroom when he became lightheaded and passed out losing consciousness for a few seconds.  Fortunately did not injure himself.  He went and saw Dr. Stallings who had labs performed which were unremarkable.  He was given a prescription of fludrocortisone to take as needed.  Otherwise he was feeling well and is continuing to exercise without any significant issues.  An event monitor was recommended at that time which showed frequent PACs, 6 brief episodes of nonsustained supraventricular tachycardia, and one 12 beat episode of wide-complex tachycardia with a rate of 174 bpm which could either be ventricular tachycardia or aberrantly conducted SVT.  We decided to monitor him at that time.     In follow-up with me 9/2022 he was taken to fludrocortisone with on a daily basis without any further syncopal episodes.  Blood pressures were largely well controlled.  I did not make any changes to his management at that time.    He returned for follow-up and was seen by SCOT Garcia in 3/2023 at which time he was continuing to do well.  He was only taking the fludrocortisone as needed which was turning out to be about every other day.    He was admitted to the hospital in 4/2023 following a recurrent episode of syncope.  Symptoms sounded orthostatic or possibly vasovagal.  This was despite taking the fludrocortisone on a daily basis.  The patient did  admit to weight loss prior to that admission.  He was noted to have positive orthostatic vital signs during that admission.  He was subsequently switched to midodrine.  He was also diagnosed with a urinary tract infection.  An echocardiogram was performed during that admission that showed normal left ventricular systolic function and wall motion with an EF of 58%, normal diastolic function, no significant valvular disease.  He was sent home with a ZIO monitor that was unremarkable.    Following his discharge she was evaluated by SCOT Garcia in the office.  He reported that he was having issues with elevated blood pressures at home and taking the midodrine twice a day.  He was now only taking the midodrine once a day around midnight.  He denies any further syncopal episodes.  His blood pressures appear to be fairly well controlled.    He returns today for routine follow-up.  He is doing well overall.  He is not sure if he is taking the midodrine once or twice a day.  Regardless his blood pressures have been largely stable and similar to his numbers today.  He denies any further episodes of syncope.  He denies any lightheadedness but reports some issues with just unsteadiness.  He primarily notes this when he is trying to golf.  He denies any chest pain, shortness of breath, palpitations, or lower extremity swelling.       Prior History:  I saw the patient initially in 8/2021 when he presented for an evaluation of bradycardia and an abnormal EKG.  Prior to that office visit he was seen for routine follow-up by Dr. Stallings.    He reported occasional decline in his blood pressures but with no associated symptoms.  An EKG was performed in the office showing sinus rhythm with a heart rate of 48 bpm, and inferior lateral borderline T wave changes.  He was referred here for further evaluation.       At his office visit with me he reported a history of dizziness that he attributed to vertigo.  In fact he had an  episode that took him to the emergency room in 4/2016.  At that time his EKG showed normal sinus rhythm with ventricular trigeminy and nonspecific inferior T wave changes.  Otherwise by the time of his office visit he denied any symptoms and was exercising on a regular basis without any problems.  I recommended proceeding with an echocardiogram.  I felt that his bradycardia was chronic and since he was asymptomatic did not feel that any further work-up was necessary at that time.  He underwent an echocardiogram on 8/23/2021 which showed normal left ventricular systolic function wall motion with an EF of 60 to 65%, mild concentric hypertrophy, normal diastolic function, no significant valvular disease.          Review of Systems   Constitutional: Negative for malaise/fatigue.   HENT:  Negative for hearing loss, hoarse voice, nosebleeds and sore throat.    Eyes:  Negative for pain.   Cardiovascular:  Negative for chest pain, claudication, cyanosis, dyspnea on exertion, irregular heartbeat, leg swelling, near-syncope, orthopnea, palpitations, paroxysmal nocturnal dyspnea and syncope.   Respiratory:  Negative for shortness of breath and snoring.    Endocrine: Negative for cold intolerance, heat intolerance, polydipsia, polyphagia and polyuria.   Skin:  Negative for itching and rash.   Musculoskeletal:  Negative for arthritis, falls, joint pain, joint swelling, muscle cramps, muscle weakness and myalgias.   Gastrointestinal:  Negative for constipation, diarrhea, dysphagia, heartburn, hematemesis, hematochezia, melena, nausea and vomiting.   Genitourinary:  Negative for frequency, hematuria and hesitancy.   Neurological:  Positive for light-headedness. Negative for excessive daytime sleepiness, dizziness, headaches, numbness and weakness.   Psychiatric/Behavioral:  Negative for depression. The patient is not nervous/anxious.        Current Outpatient Medications:     donepezil (ARICEPT) 5 MG tablet, Take 1 tablet by mouth  "Daily., Disp: , Rfl:     meclizine (ANTIVERT) 25 MG tablet, Take 1 tablet by mouth 3 (Three) Times a Day As Needed., Disp: , Rfl:     predniSONE (DELTASONE) 1 MG tablet, Take 1 tablet by mouth Daily., Disp: , Rfl:     tadalafil (CIALIS) 5 MG tablet, Take 1 tablet by mouth Daily., Disp: , Rfl:     midodrine (PROAMATINE) 2.5 MG tablet, Take 1 tablet by mouth 2 (Two) Times a Day Before Meals for 30 days., Disp: 60 tablet, Rfl: 0    Past Medical History:   Diagnosis Date    Diabetes mellitus     History of alcohol abuse     Hypertension     Low grade mucinous neoplasm of appendix     Peripheral vertigo     Prostate carcinoma        Past Surgical History:   Procedure Laterality Date    APPENDECTOMY      COLON SURGERY  2014    Right hemicolectomy-Pericecal mass    COLONOSCOPY      PROSTATE SURGERY         Family History   Problem Relation Age of Onset    Prostate cancer Brother     Heart failure Brother     Diabetes Other     Heart failure Brother     Heart failure Son        Social History     Tobacco Use    Smoking status: Former     Packs/day: 1.00     Years: 10.00     Pack years: 10.00     Types: Cigarettes     Quit date:      Years since quittin.7    Smokeless tobacco: Never    Tobacco comments:     caffeine use   Vaping Use    Vaping Use: Never used   Substance Use Topics    Alcohol use: No     Comment: 29+ year former alcoholic    Drug use: No         ECG 12 Lead    Date/Time: 2023 11:27 AM  Performed by: Carrie Castañeda MD  Authorized by: Carrie Castañeda MD   Comparison: compared with previous ECG   Comparison to previous ECG: PAC is new  Rhythm: sinus rhythm  Ectopy: atrial premature contractions  Conduction: left anterior fascicular block           Objective:     Visit Vitals  /70 (BP Location: Right arm, Patient Position: Sitting, Cuff Size: Adult)   Pulse 53   Ht 177 cm (69.69\")   Wt 85.8 kg (189 lb 3.2 oz)   SpO2 97%   BMI 27.39 kg/m²         Constitutional:       Appearance: " Normal appearance. Well-developed.   HENT:      Head: Normocephalic and atraumatic.   Neck:      Vascular: No carotid bruit or JVD.   Pulmonary:      Effort: Pulmonary effort is normal.      Breath sounds: Normal breath sounds.   Cardiovascular:      Normal rate. Regular rhythm.      No gallop.    Pulses:     Radial: 2+ bilaterally.  Edema:     Peripheral edema absent.   Abdominal:      Palpations: Abdomen is soft.   Skin:     General: Skin is warm and dry.   Neurological:      Mental Status: Alert and oriented to person, place, and time.           Assessment:          Diagnosis Plan   1. Syncope and collapse        2. Orthostatic hypotension        3. Bradycardia, sinus        4. Polymyalgia rheumatica               Plan:        Syncope.  Well to be due to orthostatic hypotension.  So far appears to be doing well after switching fludrocortisone to midodrine.  Pressures appear to be okay with current dosage.  Sinus bradycardia.  Chronic issue.  No associated symptoms.  Polymyalgia rheumatica.  Dementia.  Now being followed by neurology.    We will plan on seeing the patient back again in 6 months.

## 2023-12-05 ENCOUNTER — LAB (OUTPATIENT)
Dept: LAB | Facility: HOSPITAL | Age: 78
End: 2023-12-05
Payer: MEDICARE

## 2023-12-05 DIAGNOSIS — C61 PROSTATE CANCER: ICD-10-CM

## 2023-12-05 DIAGNOSIS — D37.3 LOW GRADE MUCINOUS NEOPLASM OF APPENDIX: ICD-10-CM

## 2023-12-05 LAB
ALBUMIN SERPL-MCNC: 3.9 G/DL (ref 3.5–5.2)
ALBUMIN/GLOB SERPL: 1.5 G/DL
ALP SERPL-CCNC: 75 U/L (ref 39–117)
ALT SERPL W P-5'-P-CCNC: 14 U/L (ref 1–41)
ANION GAP SERPL CALCULATED.3IONS-SCNC: 7.5 MMOL/L (ref 5–15)
AST SERPL-CCNC: 25 U/L (ref 1–40)
BASOPHILS # BLD AUTO: 0.03 10*3/MM3 (ref 0–0.2)
BASOPHILS NFR BLD AUTO: 0.7 % (ref 0–1.5)
BILIRUB SERPL-MCNC: 0.9 MG/DL (ref 0–1.2)
BUN SERPL-MCNC: 20 MG/DL (ref 8–23)
BUN/CREAT SERPL: 13.2 (ref 7–25)
CALCIUM SPEC-SCNC: 9 MG/DL (ref 8.6–10.5)
CEA SERPL-MCNC: 3.51 NG/ML
CHLORIDE SERPL-SCNC: 106 MMOL/L (ref 98–107)
CO2 SERPL-SCNC: 26.5 MMOL/L (ref 22–29)
CREAT SERPL-MCNC: 1.52 MG/DL (ref 0.76–1.27)
DEPRECATED RDW RBC AUTO: 41.5 FL (ref 37–54)
EGFRCR SERPLBLD CKD-EPI 2021: 47.2 ML/MIN/1.73
EOSINOPHIL # BLD AUTO: 0.11 10*3/MM3 (ref 0–0.4)
EOSINOPHIL NFR BLD AUTO: 2.6 % (ref 0.3–6.2)
ERYTHROCYTE [DISTWIDTH] IN BLOOD BY AUTOMATED COUNT: 13.9 % (ref 12.3–15.4)
GLOBULIN UR ELPH-MCNC: 2.6 GM/DL
GLUCOSE SERPL-MCNC: 88 MG/DL (ref 65–99)
HCT VFR BLD AUTO: 43.3 % (ref 37.5–51)
HGB BLD-MCNC: 14 G/DL (ref 13–17.7)
IMM GRANULOCYTES # BLD AUTO: 0.01 10*3/MM3 (ref 0–0.05)
IMM GRANULOCYTES NFR BLD AUTO: 0.2 % (ref 0–0.5)
LYMPHOCYTES # BLD AUTO: 1.14 10*3/MM3 (ref 0.7–3.1)
LYMPHOCYTES NFR BLD AUTO: 26.6 % (ref 19.6–45.3)
MCH RBC QN AUTO: 26.7 PG (ref 26.6–33)
MCHC RBC AUTO-ENTMCNC: 32.3 G/DL (ref 31.5–35.7)
MCV RBC AUTO: 82.6 FL (ref 79–97)
MONOCYTES # BLD AUTO: 0.3 10*3/MM3 (ref 0.1–0.9)
MONOCYTES NFR BLD AUTO: 7 % (ref 5–12)
NEUTROPHILS NFR BLD AUTO: 2.69 10*3/MM3 (ref 1.7–7)
NEUTROPHILS NFR BLD AUTO: 62.9 % (ref 42.7–76)
NRBC BLD AUTO-RTO: 0 /100 WBC (ref 0–0.2)
PLATELET # BLD AUTO: 167 10*3/MM3 (ref 140–450)
PMV BLD AUTO: 11.1 FL (ref 6–12)
POTASSIUM SERPL-SCNC: 4.3 MMOL/L (ref 3.5–5.2)
PROT SERPL-MCNC: 6.5 G/DL (ref 6–8.5)
PSA SERPL-MCNC: 0.3 NG/ML (ref 0–4)
RBC # BLD AUTO: 5.24 10*6/MM3 (ref 4.14–5.8)
SODIUM SERPL-SCNC: 140 MMOL/L (ref 136–145)
WBC NRBC COR # BLD AUTO: 4.28 10*3/MM3 (ref 3.4–10.8)

## 2023-12-05 PROCEDURE — 84153 ASSAY OF PSA TOTAL: CPT | Performed by: INTERNAL MEDICINE

## 2023-12-05 PROCEDURE — 82378 CARCINOEMBRYONIC ANTIGEN: CPT | Performed by: INTERNAL MEDICINE

## 2023-12-05 PROCEDURE — 85025 COMPLETE CBC W/AUTO DIFF WBC: CPT

## 2023-12-05 PROCEDURE — 36415 COLL VENOUS BLD VENIPUNCTURE: CPT

## 2023-12-05 PROCEDURE — 80053 COMPREHEN METABOLIC PANEL: CPT

## 2023-12-21 ENCOUNTER — OFFICE VISIT (OUTPATIENT)
Dept: ONCOLOGY | Facility: CLINIC | Age: 78
End: 2023-12-21
Payer: MEDICARE

## 2023-12-21 VITALS
DIASTOLIC BLOOD PRESSURE: 88 MMHG | OXYGEN SATURATION: 97 % | RESPIRATION RATE: 16 BRPM | HEIGHT: 70 IN | WEIGHT: 194.7 LBS | HEART RATE: 60 BPM | BODY MASS INDEX: 27.87 KG/M2 | SYSTOLIC BLOOD PRESSURE: 123 MMHG | TEMPERATURE: 98.6 F

## 2023-12-21 DIAGNOSIS — R79.89 CREATININE ELEVATION: ICD-10-CM

## 2023-12-21 DIAGNOSIS — C61 PROSTATE CANCER: ICD-10-CM

## 2023-12-21 DIAGNOSIS — D37.3 LOW GRADE MUCINOUS NEOPLASM OF APPENDIX: Primary | ICD-10-CM

## 2023-12-21 PROCEDURE — 99214 OFFICE O/P EST MOD 30 MIN: CPT | Performed by: INTERNAL MEDICINE

## 2023-12-21 PROCEDURE — 1126F AMNT PAIN NOTED NONE PRSNT: CPT | Performed by: INTERNAL MEDICINE

## 2023-12-21 RX ORDER — MEMANTINE HYDROCHLORIDE 28 MG/1
28 CAPSULE, EXTENDED RELEASE ORAL DAILY
COMMUNITY

## 2023-12-21 NOTE — PROGRESS NOTES
"Subjective     CHIEF COMPLAINT:      Chief Complaint   Patient presents with    Follow-up     HISTORY OF PRESENT ILLNESS:     Danni Figueroa is a 78 y.o. male patient who returns today for follow up on his appendiceal tumor and prostate cancer.  He returns today for follow-up accompanied by his wife.  He had a syncopal episode in April 2023 and was hospitalized at [facility].  He was diagnosed with sepsis secondary to UTI.  He was treated with Rocephin.  He had infection in the skin of the left lower extremity.  He had PSA checked and was elevated during the hospital stay.    Patient reports that he is feeling better since that time.  No recurrence of the syncopal episode.    ROS:  Pertinent ROS is in the HPI.     Past medical, surgical, social and family history were reviewed.     MEDICATIONS:    Current Outpatient Medications:     donepezil (ARICEPT) 5 MG tablet, Take 1 tablet by mouth Daily., Disp: , Rfl:     meclizine (ANTIVERT) 25 MG tablet, Take 1 tablet by mouth 3 (Three) Times a Day As Needed., Disp: , Rfl:     memantine (NAMENDA XR) 28 MG capsule sustained-release 24 hr extended release capsule, Take 1 capsule by mouth Daily., Disp: , Rfl:     predniSONE (DELTASONE) 1 MG tablet, Take 1 tablet by mouth Daily., Disp: , Rfl:     tadalafil (CIALIS) 5 MG tablet, Take 1 tablet by mouth Daily., Disp: , Rfl:     midodrine (PROAMATINE) 2.5 MG tablet, Take 1 tablet by mouth 2 (Two) Times a Day Before Meals for 30 days., Disp: 60 tablet, Rfl: 0    Objective     VITAL SIGNS:     Vitals:    12/21/23 1535   BP: 123/88   Pulse: 60   Resp: 16   Temp: 98.6 °F (37 °C)   TempSrc: Temporal   SpO2: 97%   Weight: 88.3 kg (194 lb 11.2 oz)   Height: 177 cm (69.69\")   PainSc: 0-No pain     Body mass index is 28.19 kg/m².     Wt Readings from Last 5 Encounters:   12/21/23 88.3 kg (194 lb 11.2 oz)   09/12/23 85.8 kg (189 lb 3.2 oz)   05/25/23 84 kg (185 lb 3.2 oz)   05/01/23 83 kg (183 lb)   03/16/23 88 kg (194 lb) "     PHYSICAL EXAMINATION:   GENERAL: The patient appears in good general condition, not in acute distress.   SKIN: No ecchymosis.  EYES: No jaundice. No Pallor.  CHEST: Normal respiratory effort.  Lungs clear bilaterally.  No added sounds.  CVS: Normal S1-S2.  No murmurs.  ABDOMEN: Soft. No tenderness. No Hepatomegaly. No Splenomegaly. No masses.    DIAGNOSTIC DATA:   Component      Latest Ref [facility] 12/5/2023   WBC      3.40 - 10.80 10*3/mm3 4.28    RBC      4.14 - 5.80 10*6/mm3 5.24    Hemoglobin      13.0 - 17.7 g/dL 14.0    Hematocrit      37.5 - 51.0 % 43.3    MCV      79.0 - 97.0 fL 82.6    MCH      26.6 - 33.0 pg 26.7    MCHC      31.5 - 35.7 g/dL 32.3    RDW      12.3 - 15.4 % 13.9    RDW-SD      37.0 - 54.0 fl 41.5    MPV      6.0 - 12.0 fL 11.1    Platelets      140 - 450 10*3/mm3 167    Neutrophil Rel %      42.7 - 76.0 % 62.9    Lymphocyte Rel %      19.6 - 45.3 % 26.6    Monocyte Rel %      5.0 - 12.0 % 7.0    Eosinophil Rel %      0.3 - 6.2 % 2.6    Basophil Rel %      0.0 - 1.5 % 0.7    Immature Granulocyte Rel %      0.0 - 0.5 % 0.2    Neutrophils Absolute      1.70 - 7.00 10*3/mm3 2.69    Lymphocytes Absolute      0.70 - 3.10 10*3/mm3 1.14    Monocytes Absolute      0.10 - 0.90 10*3/mm3 0.30    Eosinophils Absolute      0.00 - 0.40 10*3/mm3 0.11    Basophils Absolute      0.00 - 0.20 10*3/mm3 0.03    Immature Grans, Absolute      0.00 - 0.05 10*3/mm3 0.01      Component      Latest Ref [facility] 12/5/2023   Glucose      65 - 99 mg/dL 88    BUN      8 - 23 mg/dL 20    Creatinine      0.76 - 1.27 mg/dL 1.52 (H)    Sodium      136 - 145 mmol/L 140    Potassium      3.5 - 5.2 mmol/L 4.3    Chloride      98 - 107 mmol/L 106    CO2      22.0 - 29.0 mmol/L 26.5    Calcium      8.6 - 10.5 mg/dL 9.0    Total Protein      6.0 - 8.5 g/dL 6.5    Albumin      3.5 - 5.2 g/dL 3.9    ALT (SGPT)      1 - 41 U/L 14    AST (SGOT)      1 - 40 U/L 25    Alkaline Phosphatase      39 - 117 U/L 75    Total Bilirubin      0.0 - 1.2  mg/dL 0.9    Globulin      gm/dL 2.6    A/G Ratio      g/dL 1.5    BUN/Creatinine Ratio      7.0 - 25.0  13.2    Anion Gap      5.0 - 15.0 mmol/L 7.5    eGFR      >60.0 mL/min/1.73 47.2 (L)      Lab Results   Component Value Date    CEA 3.51 12/05/2023    CEA 3.51 12/06/2022    CEA 3.13 05/31/2022    CEA 3.53 11/30/2021    CEA 4.02 12/15/2020      Lab Results   Component Value Date    PSA 0.302 12/05/2023    PSA 0.998 04/29/2023    PSA 0.425 12/06/2022    PSA 0.323 05/31/2022    PSA 0.660 11/30/2021      Assessment & Plan    *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   He had right hemicolectomy on 06/19/2014. His baseline CEA was elevated at 8.9 on 6/22/14.   He Initially had thickening at the area of the right paracolic gutter concerning for possible local recurrence. The CT scan from 01/19/2016 revealed resolution of this finding.   CEA has slightly increased to 4.2 on 12/13/2019.  CT scan of the abdomen pelvis on 12/27/2019 revealed no evidence of recurrence of the appendiceal mucinous neoplasm.  CEA was 3.51 on 12/6/2022.  CEA was 3.51 on 12/5/2023.  He is doing well with no evidence of recurrence.    *Prostate cancer diagnosed in 2014.    Patient was treated with HIFU treatment in 2014.  PSA Increased to 1.460 on 1/30/18 improved afterwards.    PSA decreased to 0.347 on 12/15/2020.   PSA increased to 0.660 on 11/30/2021.  PSA decreased to 0.323 on 5/31/2022.  PSA was 0.425 on 12/6/2022.  PSA increased to 0.998 on 4/29/2023.  This was attributed to UTI and possible development of prostatitis.  PSA decreased to 0.302 on 12/5/2023.  I reassured the patient about the results.    *Elevated serum creatinine.  12/5/2023: Creatinine increased to 1.52.    PLAN:    1.  I recommended increasing fluid intake to 6-8 glasses a day.    2.  We will repeat CEA and PSA in 1 year along with CBC and CMP.  We will see him in follow-up 1 week after to review results.         Katheryn Haider,  MD  12/21/23

## 2024-03-12 ENCOUNTER — OFFICE VISIT (OUTPATIENT)
Dept: CARDIOLOGY | Facility: CLINIC | Age: 79
End: 2024-03-12
Payer: MEDICARE

## 2024-03-12 VITALS
WEIGHT: 193 LBS | DIASTOLIC BLOOD PRESSURE: 72 MMHG | BODY MASS INDEX: 28.58 KG/M2 | HEART RATE: 63 BPM | HEIGHT: 69 IN | SYSTOLIC BLOOD PRESSURE: 122 MMHG

## 2024-03-12 DIAGNOSIS — R00.1 BRADYCARDIA, SINUS: Primary | ICD-10-CM

## 2024-03-12 NOTE — PROGRESS NOTES
Subjective:     Encounter Date:03/13/2024      Patient ID: Danni Figueroa is a 78 y.o. male.    Chief Complaint: Bradycardia  History of Present Illness  This is a 79 y/o man who follows with Dr. Castañeda and is known to me. 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     He is here today for a follow up visit. He has been feeling well with no complaints of blood pressure issues or syncope since last seen. He denies chest pain, shortness of breath, palpitations, or dizziness. He denies swelling in his legs, orthopnea or PND. He is now following with neurology for a diagnosis of dementia. He is still taking midodrine 2.5 mg BID and PB has been quite stable with this.    Prior history:  He was initially seen here in the office by Dr. Castañeda for and EKG performed at his PCP's office showing a heart rate of 48 bpm and inferior lateral borderline T wave changes. He was asymptomatic with his bradycardia. He had no complaints of anginal symptoms warranting any invasive testing for the T wave abnormalities noted on his EKG. He underwent an echocardiogram on 8/10/21 demonstrating an EF of 61-65% with no significant valvular heart disease.    I saw him in March 2022 and he had just experienced a syncopal event at that time. I placed an ambulatory monitor on him that did not show any significant arrhythmias. He then saw Dr. Castañeda in September and was feeling well. No changes were made.     I then saw him in March 2023 and he was still doing well. He was taking PRN fludrocortisone to take for hypotension and he was taking it about every other day. No changes were made and he was to follow up in 6 months.    He was admitted in April 2023 for a syncopal event. He was found to be hypotensive and received fluid resuscitation. During his hospitalization, he developed a fever and workup revealed an acute  UTI with E.Coli. He was treated with IV antibiotics and sent home with oral Levaquin. He was started on midodrine for hypotension and this initially had to be titrated up to 5 mg TID. We then began having issues with hypertension and ultimately he was on 2.5 mg BID at discharge.    When I saw him in follow up in May 2023, he was taking midodrine once a day due to elevated BP. We opted to continue with this as he was doing well. He then saw Dr. Castañeda and continued to feel well.     I have reviewed and updated as appropriate allergies, current medications, past family history, past medical history, past surgical history and problem list.    Review of Systems   Constitutional: Negative for fever, malaise/fatigue, weight gain and weight loss.   HENT:  Negative for congestion, hoarse voice and sore throat.    Eyes:  Negative for blurred vision and double vision.   Cardiovascular:  Negative for chest pain, dyspnea on exertion, leg swelling, orthopnea, palpitations and syncope.   Respiratory:  Negative for cough, shortness of breath and wheezing.    Gastrointestinal:  Negative for abdominal pain, hematemesis, hematochezia and melena.   Genitourinary:  Negative for dysuria and hematuria.   Neurological:  Negative for dizziness, headaches, light-headedness and numbness.   Psychiatric/Behavioral:  Positive for memory loss. Negative for depression. The patient is not nervous/anxious.          Current Outpatient Medications:     donepezil (ARICEPT) 5 MG tablet, Take 1 tablet by mouth Daily., Disp: , Rfl:     memantine (NAMENDA XR) 28 MG capsule sustained-release 24 hr extended release capsule, Take 1 capsule by mouth Daily., Disp: , Rfl:     midodrine (PROAMATINE) 2.5 MG tablet, Take 1 tablet by mouth 2 (Two) Times a Day Before Meals for 30 days., Disp: 60 tablet, Rfl: 0    tadalafil (CIALIS) 5 MG tablet, Take 1 tablet by mouth Daily., Disp: , Rfl:     meclizine (ANTIVERT) 25 MG tablet, Take 1 tablet by mouth 3 (Three) Times a  "Day As Needed. (Patient not taking: Reported on 3/12/2024), Disp: , Rfl:     predniSONE (DELTASONE) 1 MG tablet, Take 1 tablet by mouth Daily. (Patient not taking: Reported on 3/12/2024), Disp: , Rfl:     Past Medical History:   Diagnosis Date    Diabetes mellitus     History of alcohol abuse     Hypertension     Low grade mucinous neoplasm of appendix     Peripheral vertigo     Prostate carcinoma        Past Surgical History:   Procedure Laterality Date    APPENDECTOMY      COLON SURGERY  2014    Right hemicolectomy-Pericecal mass    COLONOSCOPY      PROSTATE SURGERY         Family History   Problem Relation Age of Onset    Prostate cancer Brother     Heart failure Brother     Diabetes Other     Heart failure Brother     Heart failure Son        Social History     Tobacco Use    Smoking status: Former     Current packs/day: 0.00     Average packs/day: 1 pack/day for 10.0 years (10.0 ttl pk-yrs)     Types: Cigarettes     Start date:      Quit date:      Years since quittin.2    Smokeless tobacco: Never    Tobacco comments:     caffeine use   Vaping Use    Vaping status: Never Used   Substance Use Topics    Alcohol use: No     Comment: 29+ year former alcoholic    Drug use: No         ECG 12 Lead    Date/Time: 3/12/2024 11:57 AM  Performed by: Tea Knight APRN    Authorized by: Tea Knight APRN  Comparison: compared with previous ECG from 2023  Similar to previous ECG  Rhythm: sinus rhythm  Conduction: left anterior fascicular block             Objective:     Visit Vitals  /72   Pulse 63   Ht 175.3 cm (69\")   Wt 87.5 kg (193 lb)   BMI 28.50 kg/m²             Physical Exam  Constitutional:       Appearance: Normal appearance. He is normal weight.   HENT:      Head: Normocephalic and atraumatic.   Neck:      Vascular: No carotid bruit.   Cardiovascular:      Rate and Rhythm: Normal rate and regular rhythm.      Chest Wall: PMI is not displaced.      Pulses: Normal pulses.    "        Radial pulses are 2+ on the right side and 2+ on the left side.        Posterior tibial pulses are 2+ on the right side and 2+ on the left side.      Heart sounds: S1 normal and S2 normal. No murmur heard.     No friction rub. No gallop.   Pulmonary:      Effort: Pulmonary effort is normal.      Breath sounds: Normal breath sounds.   Abdominal:      General: Bowel sounds are normal. There is no distension.      Palpations: Abdomen is soft.   Musculoskeletal:      Right lower leg: No edema.      Left lower leg: No edema.   Skin:     General: Skin is warm and dry.      Capillary Refill: Capillary refill takes less than 2 seconds.   Neurological:      Mental Status: He is alert and oriented to person, place, and time.   Psychiatric:         Mood and Affect: Mood normal.         Behavior: Behavior normal.         Thought Content: Thought content normal.          Lab Review:   Lipid Panel          4/29/2023    06:43   Lipid Panel   Total Cholesterol 120    Triglycerides 53    HDL Cholesterol 42    VLDL Cholesterol 12    LDL Cholesterol  66    LDL/HDL Ratio 1.60          Cardiac Procedures:       Assessment:         There are no diagnoses linked to this encounter.          Plan:       1. Syncope and collapse secondary to orthostatic hypotension: No recurrent episodes. EKG is stable today. Blood pressure is stable on BID dose of midodrine. Will continue.   2. H/o bradycardia: no further bradycardic events reported. In sinus rhythm on EKG today. No changes.  3. PACs: asymptomatic. Will monitor.    Thank you for allowing me to participate in this patient's care. Please call with any questions or concerns. Mr. Figueroa will follow up with Dr. Castañeda in 3 months.       Your medication list            Accurate as of March 12, 2024 11:57 AM. If you have any questions, ask your nurse or doctor.                CONTINUE taking these medications        Instructions Last Dose Given Next Dose Due   donepezil 5 MG tablet  Commonly  known as: ARICEPT      Take 1 tablet by mouth Daily.       meclizine 25 MG tablet  Commonly known as: ANTIVERT      Take 1 tablet by mouth 3 (Three) Times a Day As Needed.       memantine 28 MG capsule sustained-release 24 hr extended release capsule  Commonly known as: NAMENDA XR      Take 1 capsule by mouth Daily.       midodrine 2.5 MG tablet  Commonly known as: PROAMATINE      Take 1 tablet by mouth 2 (Two) Times a Day Before Meals for 30 days.       predniSONE 1 MG tablet  Commonly known as: DELTASONE      Take 1 tablet by mouth Daily.       tadalafil 5 MG tablet  Commonly known as: CIALIS      Take 1 tablet by mouth Daily.                  Tea Knight, SCOT  03/12/24  11:24 AM EDT

## 2024-09-11 NOTE — PROGRESS NOTES
Subjective:     Encounter Date:09/12/2024      Patient ID: Danni Figueroa is a 78 y.o. male.    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  Patient's episode occurred about 2 weeks prior.  He reports that he was getting ready in his bathroom when he became lightheaded and passed out losing consciousness for a few seconds.  Fortunately did not injure himself.  He went and saw Dr. Stallings who had labs performed which were unremarkable.  He was given a prescription of fludrocortisone to take as needed.  Otherwise he was feeling well and is continuing to exercise without any significant issues.  An event monitor was recommended at that time which showed frequent PACs, 6 brief episodes of nonsustained supraventricular tachycardia, and one 12 beat episode of wide-complex tachycardia with a rate of 174 bpm which could either be ventricular tachycardia or aberrantly conducted SVT.  We decided to monitor him at that time.     In follow-up with me 9/2022 he was taken to fludrocortisone with on a daily basis without any further syncopal episodes.  Blood pressures were largely well controlled.  I did not make any changes to his management at that time.     He returned for follow-up and was seen by SCOT Garcia in 3/2023 at which time he was continuing to do well.  He was only taking the fludrocortisone as needed which was turning out to be about every other day.     He was admitted to the hospital in 4/2023 following a recurrent episode of syncope.  Symptoms sounded orthostatic or possibly vasovagal.  This was despite taking the fludrocortisone on a daily basis.  The patient did  admit to weight loss prior to that admission.  He was noted to have positive orthostatic vital signs during that admission.  He was subsequently switched to midodrine.  He was also diagnosed with a urinary tract infection.  An echocardiogram was performed during that admission that showed normal left ventricular systolic function and wall motion with an EF of 58%, normal diastolic function, no significant valvular disease.  He was sent home with a ZIO monitor that was unremarkable.     Following his discharge she was evaluated by SCOT Garcia in the office.  He reported that he was having issues with elevated blood pressures at home and taking the midodrine twice a day.  At that office visit he was now only taking the midodrine once a day around midnight.  He denied any further syncopal episodes.  His blood pressures appear to be fairly well controlled.     When I saw the patient last in 9/2023 he was unsure how often he was taking the midodrine but otherwise his blood pressures are well-controlled and he denies any episodes of syncope.  He returned in 3/2020 former seen by SCOT Andrew.  Prior to that office visit he was diagnosed dementia and was followed by neurology.  He reported that he was taking midodrine 2.5 mg twice a day at that time.  He had not had any further episodes of syncope.    He presents back today for follow-up.  He continues to feel well.  He walks on a daily basis without any significant issues.  Denies any chest pain, shortness of breath, palpitations, orthopnea, near-syncope or syncope or lower extremity swelling.  He continues to take the midodrine twice a day.  He and his wife deny any issues with his blood pressures.        Prior History:  I saw the patient initially in 8/2021 when he presented for an evaluation of bradycardia and an abnormal EKG.  Prior to that office visit he was seen for routine follow-up by Dr. Stallings.    He reported occasional decline in his blood  pressures but with no associated symptoms.  An EKG was performed in the office showing sinus rhythm with a heart rate of 48 bpm, and inferior lateral borderline T wave changes.  He was referred here for further evaluation.       At his office visit with me he reported a history of dizziness that he attributed to vertigo.  In fact he had an episode that took him to the emergency room in 4/2016.  At that time his EKG showed normal sinus rhythm with ventricular trigeminy and nonspecific inferior T wave changes.  Otherwise by the time of his office visit he denied any symptoms and was exercising on a regular basis without any problems.  I recommended proceeding with an echocardiogram.  I felt that his bradycardia was chronic and since he was asymptomatic did not feel that any further work-up was necessary at that time.  He underwent an echocardiogram on 8/23/2021 which showed normal left ventricular systolic function wall motion with an EF of 60 to 65%, mild concentric hypertrophy, normal diastolic function, no significant valvular disease.       Review of Systems   Constitutional: Negative for malaise/fatigue.   HENT:  Negative for hearing loss, hoarse voice, nosebleeds and sore throat.    Eyes:  Negative for pain.   Cardiovascular:  Negative for chest pain, claudication, cyanosis, dyspnea on exertion, irregular heartbeat, leg swelling, near-syncope, orthopnea, palpitations, paroxysmal nocturnal dyspnea and syncope.   Respiratory:  Negative for shortness of breath and snoring.    Endocrine: Negative for cold intolerance, heat intolerance, polydipsia, polyphagia and polyuria.   Skin:  Negative for itching and rash.   Musculoskeletal:  Negative for arthritis, falls, joint pain, joint swelling, muscle cramps, muscle weakness and myalgias.   Gastrointestinal:  Negative for constipation, diarrhea, dysphagia, heartburn, hematemesis, hematochezia, melena, nausea and vomiting.   Genitourinary:  Negative for frequency,  hematuria and hesitancy.   Neurological:  Negative for excessive daytime sleepiness, dizziness, headaches, light-headedness, numbness and weakness.   Psychiatric/Behavioral:  Negative for depression. The patient is not nervous/anxious.          Current Outpatient Medications:     donepezil (ARICEPT) 5 MG tablet, Take 1 tablet by mouth Daily., Disp: , Rfl:     memantine (NAMENDA XR) 28 MG capsule sustained-release 24 hr extended release capsule, Take 1 capsule by mouth Daily., Disp: , Rfl:     tadalafil (CIALIS) 5 MG tablet, Take 1 tablet by mouth Daily., Disp: , Rfl:     meclizine (ANTIVERT) 25 MG tablet, Take 1 tablet by mouth 3 (Three) Times a Day As Needed. (Patient not taking: Reported on 3/12/2024), Disp: , Rfl:     midodrine (PROAMATINE) 2.5 MG tablet, Take 1 tablet by mouth 2 (Two) Times a Day Before Meals for 30 days., Disp: 60 tablet, Rfl: 0    predniSONE (DELTASONE) 1 MG tablet, Take 1 tablet by mouth Daily. (Patient not taking: Reported on 3/12/2024), Disp: , Rfl:     Past Medical History:   Diagnosis Date    Diabetes mellitus     History of alcohol abuse     Hypertension     Low grade mucinous neoplasm of appendix     Peripheral vertigo     Prostate carcinoma        Past Surgical History:   Procedure Laterality Date    APPENDECTOMY      COLON SURGERY  2014    Right hemicolectomy-Pericecal mass    COLONOSCOPY      PROSTATE SURGERY         Family History   Problem Relation Age of Onset    Prostate cancer Brother     Heart failure Brother     Diabetes Other     Heart failure Brother     Heart failure Son        Social History     Tobacco Use    Smoking status: Former     Current packs/day: 0.00     Average packs/day: 1 pack/day for 10.0 years (10.0 ttl pk-yrs)     Types: Cigarettes     Start date:      Quit date:      Years since quittin.7    Smokeless tobacco: Never    Tobacco comments:     caffeine use   Vaping Use    Vaping status: Never Used   Substance Use Topics    Alcohol use:  "No     Comment: 29+ year former alcoholic    Drug use: No         ECG 12 Lead    Date/Time: 9/12/2024 11:48 AM  Performed by: Carrie Castañeda MD    Authorized by: Carrie Castañeda MD  Comparison: compared with previous ECG   Similar to previous ECG  Rhythm: sinus rhythm             Objective:     Visit Vitals  /60 (BP Location: Left arm, Patient Position: Sitting, Cuff Size: Adult)   Pulse 58   Ht 175.3 cm (69\")   Wt 86.1 kg (189 lb 12.8 oz)   SpO2 100%   BMI 28.03 kg/m²         Constitutional:       Appearance: Normal appearance. Well-developed.   HENT:      Head: Normocephalic and atraumatic.   Neck:      Vascular: No carotid bruit or JVD.   Pulmonary:      Effort: Pulmonary effort is normal.      Breath sounds: Normal breath sounds.   Cardiovascular:      Normal rate. Regular rhythm.      No gallop.    Pulses:     Radial: 2+ bilaterally.  Edema:     Peripheral edema absent.   Abdominal:      Palpations: Abdomen is soft.   Skin:     General: Skin is warm and dry.   Neurological:      Mental Status: Alert and oriented to person, place, and time.             Assessment:          Diagnosis Plan   1. Orthostatic hypotension        2. Bradycardia, sinus        3. Polymyalgia rheumatica               Plan:       1.  Orthostatic hypotension.  Appears to be well controlled with low-dose midodrine twice a day.  No episodes of syncope reported.  Will continue current management.  2.  Sinus bradycardia.  Stable with no associated symptoms.  Continue to monitor.    Will plan on seeing the patient back again in 6 months.       "

## 2024-09-12 ENCOUNTER — OFFICE VISIT (OUTPATIENT)
Age: 79
End: 2024-09-12
Payer: MEDICARE

## 2024-09-12 VITALS
WEIGHT: 189.8 LBS | HEART RATE: 58 BPM | BODY MASS INDEX: 28.11 KG/M2 | HEIGHT: 69 IN | DIASTOLIC BLOOD PRESSURE: 60 MMHG | SYSTOLIC BLOOD PRESSURE: 110 MMHG | OXYGEN SATURATION: 100 %

## 2024-09-12 DIAGNOSIS — R00.1 BRADYCARDIA, SINUS: ICD-10-CM

## 2024-09-12 DIAGNOSIS — I95.1 ORTHOSTATIC HYPOTENSION: Primary | ICD-10-CM

## 2024-09-12 DIAGNOSIS — M35.3 POLYMYALGIA RHEUMATICA: ICD-10-CM

## 2024-09-12 PROCEDURE — 99214 OFFICE O/P EST MOD 30 MIN: CPT | Performed by: INTERNAL MEDICINE

## 2024-09-12 PROCEDURE — 1159F MED LIST DOCD IN RCRD: CPT | Performed by: INTERNAL MEDICINE

## 2024-09-12 PROCEDURE — 93000 ELECTROCARDIOGRAM COMPLETE: CPT | Performed by: INTERNAL MEDICINE

## 2024-09-12 PROCEDURE — 1160F RVW MEDS BY RX/DR IN RCRD: CPT | Performed by: INTERNAL MEDICINE

## 2024-11-27 DIAGNOSIS — D37.3 LOW GRADE MUCINOUS NEOPLASM OF APPENDIX: Primary | ICD-10-CM

## 2024-11-27 DIAGNOSIS — C61 PROSTATE CANCER: ICD-10-CM

## 2024-12-02 ENCOUNTER — LAB (OUTPATIENT)
Dept: LAB | Facility: HOSPITAL | Age: 79
End: 2024-12-02
Payer: MEDICARE

## 2024-12-02 DIAGNOSIS — C61 PROSTATE CANCER: ICD-10-CM

## 2024-12-02 DIAGNOSIS — D37.3 LOW GRADE MUCINOUS NEOPLASM OF APPENDIX: ICD-10-CM

## 2024-12-02 LAB
ALBUMIN SERPL-MCNC: 3.9 G/DL (ref 3.5–5.2)
ALBUMIN/GLOB SERPL: 1.5 G/DL
ALP SERPL-CCNC: 72 U/L (ref 39–117)
ALT SERPL W P-5'-P-CCNC: 19 U/L (ref 1–41)
ANION GAP SERPL CALCULATED.3IONS-SCNC: 9.8 MMOL/L (ref 5–15)
AST SERPL-CCNC: 27 U/L (ref 1–40)
BASOPHILS # BLD AUTO: 0.03 10*3/MM3 (ref 0–0.2)
BASOPHILS NFR BLD AUTO: 0.5 % (ref 0–1.5)
BILIRUB SERPL-MCNC: 0.8 MG/DL (ref 0–1.2)
BUN SERPL-MCNC: 23 MG/DL (ref 8–23)
BUN/CREAT SERPL: 15.3 (ref 7–25)
CALCIUM SPEC-SCNC: 8.8 MG/DL (ref 8.6–10.5)
CEA SERPL-MCNC: 3.15 NG/ML
CHLORIDE SERPL-SCNC: 105 MMOL/L (ref 98–107)
CO2 SERPL-SCNC: 29.2 MMOL/L (ref 22–29)
CREAT SERPL-MCNC: 1.5 MG/DL (ref 0.76–1.27)
DEPRECATED RDW RBC AUTO: 41.5 FL (ref 37–54)
EGFRCR SERPLBLD CKD-EPI 2021: 47.7 ML/MIN/1.73
EOSINOPHIL # BLD AUTO: 0.04 10*3/MM3 (ref 0–0.4)
EOSINOPHIL NFR BLD AUTO: 0.7 % (ref 0.3–6.2)
ERYTHROCYTE [DISTWIDTH] IN BLOOD BY AUTOMATED COUNT: 13.4 % (ref 12.3–15.4)
GLOBULIN UR ELPH-MCNC: 2.6 GM/DL
GLUCOSE SERPL-MCNC: 78 MG/DL (ref 65–99)
HCT VFR BLD AUTO: 44.1 % (ref 37.5–51)
HGB BLD-MCNC: 13.9 G/DL (ref 13–17.7)
IMM GRANULOCYTES # BLD AUTO: 0.03 10*3/MM3 (ref 0–0.05)
IMM GRANULOCYTES NFR BLD AUTO: 0.5 % (ref 0–0.5)
LYMPHOCYTES # BLD AUTO: 1.28 10*3/MM3 (ref 0.7–3.1)
LYMPHOCYTES NFR BLD AUTO: 21.2 % (ref 19.6–45.3)
MCH RBC QN AUTO: 26.6 PG (ref 26.6–33)
MCHC RBC AUTO-ENTMCNC: 31.5 G/DL (ref 31.5–35.7)
MCV RBC AUTO: 84.3 FL (ref 79–97)
MONOCYTES # BLD AUTO: 0.37 10*3/MM3 (ref 0.1–0.9)
MONOCYTES NFR BLD AUTO: 6.1 % (ref 5–12)
NEUTROPHILS NFR BLD AUTO: 4.29 10*3/MM3 (ref 1.7–7)
NEUTROPHILS NFR BLD AUTO: 71 % (ref 42.7–76)
NRBC BLD AUTO-RTO: 0 /100 WBC (ref 0–0.2)
PLATELET # BLD AUTO: 182 10*3/MM3 (ref 140–450)
PMV BLD AUTO: 10 FL (ref 6–12)
POTASSIUM SERPL-SCNC: 3.9 MMOL/L (ref 3.5–5.2)
PROT SERPL-MCNC: 6.5 G/DL (ref 6–8.5)
PSA SERPL-MCNC: 0.25 NG/ML (ref 0–4)
RBC # BLD AUTO: 5.23 10*6/MM3 (ref 4.14–5.8)
SODIUM SERPL-SCNC: 144 MMOL/L (ref 136–145)
WBC NRBC COR # BLD AUTO: 6.04 10*3/MM3 (ref 3.4–10.8)

## 2024-12-02 PROCEDURE — 80053 COMPREHEN METABOLIC PANEL: CPT

## 2024-12-02 PROCEDURE — 36415 COLL VENOUS BLD VENIPUNCTURE: CPT

## 2024-12-02 PROCEDURE — 84153 ASSAY OF PSA TOTAL: CPT | Performed by: INTERNAL MEDICINE

## 2024-12-02 PROCEDURE — 85025 COMPLETE CBC W/AUTO DIFF WBC: CPT

## 2024-12-02 PROCEDURE — 82378 CARCINOEMBRYONIC ANTIGEN: CPT | Performed by: INTERNAL MEDICINE

## 2024-12-09 ENCOUNTER — OFFICE VISIT (OUTPATIENT)
Dept: ONCOLOGY | Facility: CLINIC | Age: 79
End: 2024-12-09
Payer: MEDICARE

## 2024-12-09 VITALS
BODY MASS INDEX: 27.7 KG/M2 | TEMPERATURE: 98 F | WEIGHT: 193.5 LBS | SYSTOLIC BLOOD PRESSURE: 122 MMHG | OXYGEN SATURATION: 93 % | HEIGHT: 70 IN | RESPIRATION RATE: 17 BRPM | DIASTOLIC BLOOD PRESSURE: 86 MMHG | HEART RATE: 80 BPM

## 2024-12-09 DIAGNOSIS — C61 PROSTATE CANCER: ICD-10-CM

## 2024-12-09 DIAGNOSIS — D37.3 LOW GRADE MUCINOUS NEOPLASM OF APPENDIX: Primary | ICD-10-CM

## 2024-12-09 PROCEDURE — 1159F MED LIST DOCD IN RCRD: CPT | Performed by: INTERNAL MEDICINE

## 2024-12-09 PROCEDURE — 1160F RVW MEDS BY RX/DR IN RCRD: CPT | Performed by: INTERNAL MEDICINE

## 2024-12-09 PROCEDURE — 99213 OFFICE O/P EST LOW 20 MIN: CPT | Performed by: INTERNAL MEDICINE

## 2024-12-09 PROCEDURE — 1126F AMNT PAIN NOTED NONE PRSNT: CPT | Performed by: INTERNAL MEDICINE

## 2024-12-09 NOTE — PROGRESS NOTES
"Subjective     CHIEF COMPLAINT:      Chief Complaint   Patient presents with    Follow-up     HISTORY OF PRESENT ILLNESS:     Danni Figueroa is a 79 y.o. male patient who returns today for follow up on his low-grade mucinous neoplasm of the appendix and history of prostate cancer.  He returns today for follow-up accompanied by his wife.  He reports that he is feeling good.  He is not having abdominal pain.  No abdominal distention.  No problem with recurrent diarrhea.    ROS:  Pertinent ROS is in the HPI.     Past medical, surgical, social and family history were reviewed.     MEDICATIONS:    Current Outpatient Medications:     donepezil (ARICEPT) 5 MG tablet, Take 1 tablet by mouth Daily., Disp: , Rfl:     meclizine (ANTIVERT) 25 MG tablet, Take 1 tablet by mouth 3 (Three) Times a Day As Needed., Disp: , Rfl:     memantine (NAMENDA XR) 28 MG capsule sustained-release 24 hr extended release capsule, Take 1 capsule by mouth Daily., Disp: , Rfl:     predniSONE (DELTASONE) 1 MG tablet, Take 1 tablet by mouth Daily., Disp: , Rfl:     tadalafil (CIALIS) 5 MG tablet, Take 1 tablet by mouth Daily., Disp: , Rfl:     midodrine (PROAMATINE) 2.5 MG tablet, Take 1 tablet by mouth 2 (Two) Times a Day Before Meals for 30 days., Disp: 60 tablet, Rfl: 0  Objective     VITAL SIGNS:     Vitals:    12/09/24 1438   BP: 122/86   Pulse: 80   Resp: 17   Temp: 98 °F (36.7 °C)   TempSrc: Oral   SpO2: 93%   Weight: 87.8 kg (193 lb 8 oz)   Height: 177.8 cm (70\")   PainSc: 0-No pain     Body mass index is 27.76 kg/m².     Wt Readings from Last 5 Encounters:   12/09/24 87.8 kg (193 lb 8 oz)   09/12/24 86.1 kg (189 lb 12.8 oz)   03/12/24 87.5 kg (193 lb)   12/21/23 88.3 kg (194 lb 11.2 oz)   09/12/23 85.8 kg (189 lb 3.2 oz)     PHYSICAL EXAMINATION:   GENERAL: The patient appears in good general condition, not in acute distress.   SKIN: No Ecchymosis.  EYES: No jaundice. No Pallor.  CHEST: Normal respiratory effort. Normal breathing sounds " bilaterally. No added sounds.  CVS: Normal S1 and S2. No Murmur.  ABDOMEN: Soft. No tenderness. No Hepatomegaly. No Splenomegaly. No masses.  EXTREMITIES: No joint deformity.     DIAGNOSTIC DATA:   Component      Latest Ref Rn 12/2/2024   WBC      3.40 - 10.80 10*3/mm3 6.04    RBC      4.14 - 5.80 10*6/mm3 5.23    Hemoglobin      13.0 - 17.7 g/dL 13.9    Hematocrit      37.5 - 51.0 % 44.1    MCV      79.0 - 97.0 fL 84.3    MCH      26.6 - 33.0 pg 26.6    MCHC      31.5 - 35.7 g/dL 31.5    RDW      12.3 - 15.4 % 13.4    RDW-SD      37.0 - 54.0 fl 41.5    MPV      6.0 - 12.0 fL 10.0    Platelets      140 - 450 10*3/mm3 182    Neutrophil Rel %      42.7 - 76.0 % 71.0    Lymphocyte Rel %      19.6 - 45.3 % 21.2    Monocyte Rel %      5.0 - 12.0 % 6.1    Eosinophil Rel %      0.3 - 6.2 % 0.7    Basophil Rel %      0.0 - 1.5 % 0.5    Immature Granulocyte Rel %      0.0 - 0.5 % 0.5    Neutrophils Absolute      1.70 - 7.00 10*3/mm3 4.29    Lymphocytes Absolute      0.70 - 3.10 10*3/mm3 1.28    Monocytes Absolute      0.10 - 0.90 10*3/mm3 0.37    Eosinophils Absolute      0.00 - 0.40 10*3/mm3 0.04    Basophils Absolute      0.00 - 0.20 10*3/mm3 0.03    Immature Grans, Absolute      0.00 - 0.05 10*3/mm3 0.03    nRBC      0.0 - 0.2 /100 WBC 0.0    Glucose      65 - 99 mg/dL 78    BUN      8 - 23 mg/dL 23    Creatinine      0.76 - 1.27 mg/dL 1.50 (H)    Sodium      136 - 145 mmol/L 144    Potassium      3.5 - 5.2 mmol/L 3.9    Chloride      98 - 107 mmol/L 105    CO2      22.0 - 29.0 mmol/L 29.2 (H)    Calcium      8.6 - 10.5 mg/dL 8.8    Total Protein      6.0 - 8.5 g/dL 6.5    Albumin      3.5 - 5.2 g/dL 3.9    ALT (SGPT)      1 - 41 U/L 19    AST (SGOT)      1 - 40 U/L 27    Alkaline Phosphatase      39 - 117 U/L 72    Total Bilirubin      0.0 - 1.2 mg/dL 0.8    Globulin      gm/dL 2.6    A/G Ratio      g/dL 1.5    BUN/Creatinine Ratio      7.0 - 25.0  15.3    Anion Gap      5.0 - 15.0 mmol/L 9.8    eGFR      >60.0  mL/min/1.73 47.7 (L)      Lab Results   Component Value Date    CEA 3.15 12/02/2024    CEA 3.51 12/05/2023    CEA 3.51 12/06/2022    CEA 3.13 05/31/2022    CEA 3.53 11/30/2021      Lab Results   Component Value Date    PSA 0.254 12/02/2024    PSA 0.302 12/05/2023    PSA 0.998 04/29/2023    PSA 0.425 12/06/2022    PSA 0.323 05/31/2022      Assessment & Plan    *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   He had right hemicolectomy on 06/19/2014. His baseline CEA was elevated at 8.9 on 6/22/14.   He Initially had thickening at the area of the right paracolic gutter concerning for possible local recurrence. The CT scan from 01/19/2016 revealed resolution of this finding.   CEA has slightly increased to 4.2 on 12/13/2019.  CT scan of the abdomen pelvis on 12/27/2019 revealed no evidence of recurrence of the appendiceal mucinous neoplasm.  CEA was 3.51 on 12/6/2022.  CEA was 3.51 on 12/5/2023.  CEA was 3.15 on 12/2/2024.  He is doing well with no evidence of recurrence.  He is now 10 years since diagnosis.    *Prostate cancer diagnosed in 2014.    Patient was treated with HIFU treatment in 2014.  PSA Increased to 1.460 on 1/30/18 improved afterwards.    PSA decreased to 0.347 on 12/15/2020.   PSA increased to 0.660 on 11/30/2021.  PSA decreased to 0.323 on 5/31/2022.  PSA was 0.425 on 12/6/2022.  PSA increased to 0.998 on 4/29/2023.  This was attributed to UTI and possible development of prostatitis.  PSA decreased to 0.302 on 12/5/2023.  PSA was 0.25 on 12/2/2024.    *Elevated serum creatinine.  12/5/2023: Creatinine increased to 1.52.  12/2/2024: Creatinine 1.50.    PLAN:    1.  I reassured the patient that there is no evidence of recurrence of the appendix neoplasm over the prostate cancer.  2.  Since he is now 10 years since his diagnosis, I did not recommend additional follow-up at our office.  He will continue to follow-up with his primary care physician.  I recommended obtaining  CEA and PSA once a year.        Katheryn Haider MD  12/09/24

## 2024-12-19 ENCOUNTER — OFFICE VISIT (OUTPATIENT)
Dept: SURGERY | Facility: CLINIC | Age: 79
End: 2024-12-19
Payer: MEDICARE

## 2024-12-19 VITALS
DIASTOLIC BLOOD PRESSURE: 90 MMHG | SYSTOLIC BLOOD PRESSURE: 126 MMHG | OXYGEN SATURATION: 98 % | BODY MASS INDEX: 27.49 KG/M2 | WEIGHT: 192 LBS | HEART RATE: 64 BPM | HEIGHT: 70 IN

## 2024-12-19 DIAGNOSIS — K62.5 BLEEDING PER RECTUM: Primary | ICD-10-CM

## 2024-12-19 NOTE — PROGRESS NOTES
General Surgery H&P/Consultation    Impression/Plan:    Mr. Danni Figueroa is a 79 y.o. with rectal bleeding.  Recommend colonoscopy for further evaluation.  Risk and rationale for the procedure have been discussed with him including risk of bleeding, perforation.  He agrees to proceed.    Referring Provider: Ya Stallings MD    Chief Complaint:    Bright red bleeding per rectum    History of Present Illness:    Mr. Danni Figueroa is a 79 y.o. gentleman presenting for evaluation of bright red bleeding per rectum.  He reports he last had a colonoscopy around 10 years ago.  This was in preparation for a right colectomy for what on final pathology demonstrated a cecal low-grade appendiceal mucinous neoplasm likely arising within an appendiceal remnant with rupture that extended into surrounding pericecal soft tissues including skeletal muscle forming a mucocele 7.5 cm in diameter.  Margins were clear.  He has been followed for this for 10 years with no evidence of recurrence.  He reports the bleeding is intermittent and sometimes is on the toilet paper and occasionally has been in the toilet bowl.  Denies any pain or change in bowel habits.  He reports he has typically 1 bowel movement per day, on the toilet for less than 5 minutes, and does not have to strain.      Past Medical History:   Past Medical History:   Diagnosis Date    Diabetes mellitus     History of alcohol abuse     Hypertension     Low grade mucinous neoplasm of appendix     Peripheral vertigo     Prostate carcinoma           Past Surgical History:    Past Surgical History:   Procedure Laterality Date    APPENDECTOMY  1993    COLON SURGERY  06/2014    Right hemicolectomy-Pericecal mass    COLONOSCOPY      PROSTATE SURGERY  2014         Family History:    Family History   Problem Relation Age of Onset    Prostate cancer Brother     Heart failure Brother     Diabetes Other     Heart failure Brother     Heart failure Son          Social  "History:    Social History     Socioeconomic History    Marital status:      Spouse name: Paula    Years of education: College   Tobacco Use    Smoking status: Former     Current packs/day: 0.00     Average packs/day: 1 pack/day for 10.0 years (10.0 ttl pk-yrs)     Types: Cigarettes     Start date:      Quit date:      Years since quittin.9    Smokeless tobacco: Never    Tobacco comments:     caffeine use   Vaping Use    Vaping status: Never Used   Substance and Sexual Activity    Alcohol use: No     Comment: 29+ year former alcoholic    Drug use: No    Sexual activity: Defer         Allergies:   No Known Allergies    Medications:     Current Outpatient Medications:     B Complex Vitamins (B-COMPLEX/B-12 PO), Take  by mouth., Disp: , Rfl:     donepezil (ARICEPT) 5 MG tablet, Take 1 tablet by mouth Daily., Disp: , Rfl:     Fexofenadine HCl (ALLEGRA ALLERGY PO), Take  by mouth., Disp: , Rfl:     memantine (NAMENDA XR) 28 MG capsule sustained-release 24 hr extended release capsule, Take 1 capsule by mouth Daily., Disp: , Rfl:     midodrine (PROAMATINE) 2.5 MG tablet, Take 1 tablet by mouth 2 (Two) Times a Day Before Meals for 30 days., Disp: 60 tablet, Rfl: 0    predniSONE (DELTASONE) 1 MG tablet, Take 1 tablet by mouth Daily., Disp: , Rfl:     meclizine (ANTIVERT) 25 MG tablet, Take 1 tablet by mouth 3 (Three) Times a Day As Needed. (Patient not taking: Reported on 2024), Disp: , Rfl:     tadalafil (CIALIS) 5 MG tablet, Take 1 tablet by mouth Daily. (Patient not taking: Reported on 2024), Disp: , Rfl:     Radiology/Endoscopy:    CT abdomen pelvis reviewed showing changes of prior ileocecectomy    Labs:    Labs from 2024 reviewed, hemoglobin 13.9, hematocrit 44.1    Body mass index is 27.55 kg/m².  177.8 cm (70\")  87.1 kg (192 lb)      Physical Exam:   Constitutional: Well-developed well-nourished, no acute distress   Respiratory: No increased work of breathing, Symmetric " excursion  Cardiovascular: Well perfused, no jugular venous distention evident   Gastrointestinal: Soft, nontender, well-healed right lower quadrant incision from appendectomy, well-healed laparoscopic incisions without evidence of hernia on examination           Lupillo Jones MD  General and Endoscopic Surgery  [facility]    [address], [address]  [place], [place], [address]  P: [phone]  F: [phone]

## 2025-01-28 ENCOUNTER — ANESTHESIA EVENT (OUTPATIENT)
Dept: GASTROENTEROLOGY | Facility: HOSPITAL | Age: 80
End: 2025-01-28
Payer: MEDICARE

## 2025-01-28 ENCOUNTER — HOSPITAL ENCOUNTER (OUTPATIENT)
Facility: HOSPITAL | Age: 80
Setting detail: HOSPITAL OUTPATIENT SURGERY
Discharge: HOME | End: 2025-01-28
Attending: SURGERY | Admitting: SURGERY
Payer: MEDICARE

## 2025-01-28 ENCOUNTER — ANESTHESIA (OUTPATIENT)
Dept: GASTROENTEROLOGY | Facility: HOSPITAL | Age: 80
End: 2025-01-28
Payer: MEDICARE

## 2025-01-28 VITALS
HEIGHT: 70 IN | BODY MASS INDEX: 27.62 KG/M2 | SYSTOLIC BLOOD PRESSURE: 111 MMHG | OXYGEN SATURATION: 96 % | DIASTOLIC BLOOD PRESSURE: 87 MMHG | RESPIRATION RATE: 14 BRPM | WEIGHT: 192.9 LBS | HEART RATE: 90 BPM

## 2025-01-28 DIAGNOSIS — K62.5 BLEEDING PER RECTUM: ICD-10-CM

## 2025-01-28 LAB — GLUCOSE BLDC GLUCOMTR-MCNC: 90 MG/DL (ref 70–130)

## 2025-01-28 PROCEDURE — 25810000003 LACTATED RINGERS PER 1000 ML: Performed by: SURGERY

## 2025-01-28 PROCEDURE — 25010000002 PHENYLEPHRINE 10 MG/ML SOLUTION: Performed by: NURSE ANESTHETIST, CERTIFIED REGISTERED

## 2025-01-28 PROCEDURE — 82948 REAGENT STRIP/BLOOD GLUCOSE: CPT

## 2025-01-28 PROCEDURE — 25010000002 LIDOCAINE 2% SOLUTION: Performed by: NURSE ANESTHETIST, CERTIFIED REGISTERED

## 2025-01-28 PROCEDURE — 88305 TISSUE EXAM BY PATHOLOGIST: CPT | Performed by: SURGERY

## 2025-01-28 PROCEDURE — 25010000002 PROPOFOL 1000 MG/100ML EMULSION: Performed by: NURSE ANESTHETIST, CERTIFIED REGISTERED

## 2025-01-28 PROCEDURE — 25010000002 GLYCOPYRROLATE 0.2 MG/ML SOLUTION: Performed by: NURSE ANESTHETIST, CERTIFIED REGISTERED

## 2025-01-28 RX ORDER — PHENYLEPHRINE HYDROCHLORIDE 10 MG/ML
INJECTION INTRAVENOUS AS NEEDED
Status: DISCONTINUED | OUTPATIENT
Start: 2025-01-28 | End: 2025-01-28 | Stop reason: SURG

## 2025-01-28 RX ORDER — EPHEDRINE SULFATE 50 MG/ML
INJECTION, SOLUTION INTRAVENOUS AS NEEDED
Status: DISCONTINUED | OUTPATIENT
Start: 2025-01-28 | End: 2025-01-28 | Stop reason: SURG

## 2025-01-28 RX ORDER — LIDOCAINE HYDROCHLORIDE 20 MG/ML
INJECTION, SOLUTION INFILTRATION; PERINEURAL AS NEEDED
Status: DISCONTINUED | OUTPATIENT
Start: 2025-01-28 | End: 2025-01-28 | Stop reason: SURG

## 2025-01-28 RX ORDER — PROPOFOL 10 MG/ML
INJECTION, EMULSION INTRAVENOUS AS NEEDED
Status: DISCONTINUED | OUTPATIENT
Start: 2025-01-28 | End: 2025-01-28 | Stop reason: SURG

## 2025-01-28 RX ORDER — GLYCOPYRROLATE 0.2 MG/ML
INJECTION INTRAMUSCULAR; INTRAVENOUS AS NEEDED
Status: DISCONTINUED | OUTPATIENT
Start: 2025-01-28 | End: 2025-01-28 | Stop reason: SURG

## 2025-01-28 RX ORDER — SODIUM CHLORIDE, SODIUM LACTATE, POTASSIUM CHLORIDE, CALCIUM CHLORIDE 600; 310; 30; 20 MG/100ML; MG/100ML; MG/100ML; MG/100ML
1000 INJECTION, SOLUTION INTRAVENOUS CONTINUOUS
Status: DISCONTINUED | OUTPATIENT
Start: 2025-01-28 | End: 2025-01-28 | Stop reason: HOSPADM

## 2025-01-28 RX ADMIN — PROPOFOL INJECTABLE EMULSION 180 MCG/KG/MIN: 10 INJECTION, EMULSION INTRAVENOUS at 11:13

## 2025-01-28 RX ADMIN — PHENYLEPHRINE HYDROCHLORIDE 200 MCG: 10 INJECTION INTRAVENOUS at 11:38

## 2025-01-28 RX ADMIN — EPHEDRINE SULFATE 15 MG: 50 INJECTION INTRAMUSCULAR; INTRAVENOUS; SUBCUTANEOUS at 11:31

## 2025-01-28 RX ADMIN — PHENYLEPHRINE HYDROCHLORIDE 200 MCG: 10 INJECTION INTRAVENOUS at 11:22

## 2025-01-28 RX ADMIN — GLYCOPYRROLATE 0.2 MG: 0.2 INJECTION INTRAMUSCULAR; INTRAVENOUS at 11:11

## 2025-01-28 RX ADMIN — PROPOFOL INJECTABLE EMULSION 100 MG: 10 INJECTION, EMULSION INTRAVENOUS at 11:12

## 2025-01-28 RX ADMIN — PHENYLEPHRINE HYDROCHLORIDE 200 MCG: 10 INJECTION INTRAVENOUS at 11:19

## 2025-01-28 RX ADMIN — PHENYLEPHRINE HYDROCHLORIDE 300 MCG: 10 INJECTION INTRAVENOUS at 11:33

## 2025-01-28 RX ADMIN — LIDOCAINE HYDROCHLORIDE 80 MG: 20 INJECTION, SOLUTION INFILTRATION; PERINEURAL at 11:11

## 2025-01-28 RX ADMIN — EPHEDRINE SULFATE 15 MG: 50 INJECTION INTRAMUSCULAR; INTRAVENOUS; SUBCUTANEOUS at 11:38

## 2025-01-28 RX ADMIN — SODIUM CHLORIDE, POTASSIUM CHLORIDE, SODIUM LACTATE AND CALCIUM CHLORIDE 1000 ML: 600; 310; 30; 20 INJECTION, SOLUTION INTRAVENOUS at 09:40

## 2025-01-28 NOTE — OP NOTE
Colonoscopy Procedure Note  Danni Figueroa  1945  Date of Procedure: 01/28/25    Pre-operative Diagnosis:    Rectal bleeding, history of right colectomy    Post-operative Diagnosis:  Same, descending colon polyp, grade 1 nonbleeding internal hemorrhoids    Procedure: Colonoscopy to ileocolic anastomosis with hot snare biopsy of descending colon polyp    Findings/Treatments:   Patent, healthy appearance of ileocolic anastomosis   5 mm sessile descending colon polyp  Grade 1 nonbleeding internal hemorrhoids         Recommendations:   Follow-up results of pathology.  The office will call within the next  3-10 days with a final recommendation.    Surgeon: Lupillo Jones MD    Anesthetic: MAC per Capri Loaiza MD      Procedure Details:    MAC anesthesia was induced.  A digital rectal exam was performed along with visual inspection of the anus. The 180 Colonoscope was inserted into the rectum and advanced to the ileocolic anastomosis.  Anastomosis was photographed for documentation.       A careful inspection was made as the scope was withdrawn, including a retroflexed view of the rectum.  In the descending colon there was a 5 mm sessile polyp which was removed in its entirety with a hot snare with settings on the cautery of 0 and 11.  Tissue was retrieved via suction through the scope. Retroflexion in the rectum revealed grade 1 nonbleeding internal hemorrhoid. Prep quality was excellent.      Lupillo Jones M.D.  General, Endoscopic, and Robotic Surgery  [facility]    [address] [place] [address], [address]  [place], [place], [address]  P: [phone]  F: [phone]

## 2025-01-28 NOTE — ANESTHESIA POSTPROCEDURE EVALUATION
Patient: Danni Figueroa    Procedure Summary       Date: 01/28/25 Room / Location:  [facility]    Anesthesia Start: 1105 Anesthesia Stop: 1140    Procedure: COLONOSCOPY TO ANASTAMOSIS WITH HOT SNARE POLYPECTOMY Diagnosis:       Bleeding per rectum      (Bleeding per rectum [K62.5])    Surgeons: Lupillo Jones MD Provider: Capri Loaiza MD    Anesthesia Type: MAC ASA Status: 3            Anesthesia Type: MAC    Vitals  Vitals Value Taken Time   /87 01/28/25 1210   Temp     Pulse 90 01/28/25 1214   Resp 14 01/28/25 1210   SpO2 100 % 01/28/25 1211   Vitals shown include unfiled device data.        Post Anesthesia Care and Evaluation    Patient location during evaluation: bedside  Patient participation: complete - patient participated  Level of consciousness: awake  Pain management: adequate    Airway patency: patent  Anesthetic complications: No anesthetic complications    Cardiovascular status: acceptable  Respiratory status: acceptable  Hydration status: acceptable

## 2025-01-28 NOTE — ANESTHESIA PREPROCEDURE EVALUATION
Anesthesia Evaluation       NPO Liquid Status: > 2 hours           Airway   Mallampati: III  TM distance: >3 FB  Neck ROM: full  No difficulty expected  Dental      Comment: caps      Pulmonary    Cardiovascular         Neuro/Psych  (+) syncope, dementia  GI/Hepatic/Renal/Endo    (+) GI bleeding lower , diabetes mellitus    Musculoskeletal     Abdominal    Substance History   (+) alcohol use      Comment: History of alcohol abuse   OB/GYN          Other      history of cancer                Anesthesia Plan    ASA 3     MAC     intravenous induction     Anesthetic plan, risks, benefits, and alternatives have been provided, discussed and informed consent has been obtained with: patient.    CODE STATUS:

## 2025-01-28 NOTE — H&P
SURGERY  [facility] AKILA Figueroa  1945    01/28/25    HPI: 79 y.o. male here for colonoscopy after recent rectal bleeding.  He denies any rectal bleeding since being seen in the office on 12/19/2024.  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  He was followed for 10 years without any evidence of recurrence.  Last colonoscopy was 10 years ago in preparation for right colectomy.    Past Medical History:   Diagnosis Date    Diabetes mellitus     History of alcohol abuse     Hypertension     Low grade mucinous neoplasm of appendix     Peripheral vertigo     Prostate carcinoma        Past Surgical History:   Procedure Laterality Date    APPENDECTOMY  1993    COLON SURGERY  06/2014    Right hemicolectomy-Pericecal mass    COLONOSCOPY      PROSTATE SURGERY  2014       has No Known Allergies.       Medication List        ASK your doctor about these medications      ALLEGRA ALLERGY PO     B-COMPLEX/B-12 PO     donepezil 5 MG tablet  Commonly known as: ARICEPT     meclizine 25 MG tablet  Commonly known as: ANTIVERT     memantine 28 MG capsule sustained-release 24 hr extended release capsule  Commonly known as: NAMENDA XR     midodrine 2.5 MG tablet  Commonly known as: PROAMATINE  Take 1 tablet by mouth 2 (Two) Times a Day Before Meals for 30 days.     predniSONE 1 MG tablet  Commonly known as: DELTASONE     tadalafil 5 MG tablet  Commonly known as: CIALIS     vitamin D3 125 MCG (5000 UT) capsule capsule              Family History   Problem Relation Age of Onset    Prostate cancer Brother     Heart failure Brother     Diabetes Other     Heart failure Brother     Heart failure Son        Social History     Socioeconomic History    Marital status:      Spouse name: Paula    Years of education: College   Tobacco Use    Smoking status: Former     Current packs/day: 0.00     Average packs/day: 1 pack/day for 10.0 years (10.0 ttl pk-yrs)     Types: Cigarettes     Start  date:      Quit date:      Years since quittin.1    Smokeless tobacco: Never    Tobacco comments:     caffeine use   Vaping Use    Vaping status: Never Used   Substance and Sexual Activity    Alcohol use: No     Comment: 29+ year former alcoholic    Drug use: No    Sexual activity: Defer       Vitals:    25 0933   BP: 134/92   Pulse: 60   Resp: 14   SpO2: 100%       Body mass index is 27.68 kg/m².    Physical Exam    General: No acute distress  Lungs: No labored breathing, Pulse oximetry on room air is 100%.  Heart: RRR  Abdo: Soft  Mental:  Awake, alert, and oriented      Assessment/Plan  Rectal bleeding  Proceed with colonoscopy.  Risk, benefits discussed including risk of perforation, bleeding.    Lupillo Jones MD  11:07 EST

## 2025-01-29 LAB
CYTO UR: NORMAL
LAB AP CASE REPORT: NORMAL
PATH REPORT.FINAL DX SPEC: NORMAL
PATH REPORT.GROSS SPEC: NORMAL

## 2025-01-31 NOTE — PROGRESS NOTES
I left his wife a voicemail at the number on his verbal release with results from his colonoscopy.  He would not be due for another colonoscopy for 5 years based on pathology.  Would recommend discussion of risk and benefits of further colonoscopy at that time depending on his overall health.    Can you place colonoscopy recall in HIM for 5 years?  Thanks

## 2025-03-12 ENCOUNTER — OFFICE VISIT (OUTPATIENT)
Dept: CARDIOLOGY | Facility: CLINIC | Age: 80
End: 2025-03-12
Payer: MEDICARE

## 2025-03-12 VITALS
DIASTOLIC BLOOD PRESSURE: 72 MMHG | BODY MASS INDEX: 27.68 KG/M2 | HEIGHT: 70 IN | SYSTOLIC BLOOD PRESSURE: 112 MMHG | HEART RATE: 81 BPM

## 2025-03-12 DIAGNOSIS — I95.1 ORTHOSTATIC HYPOTENSION: Primary | ICD-10-CM

## 2025-03-12 DIAGNOSIS — R00.1 BRADYCARDIA, SINUS: ICD-10-CM

## 2025-03-12 PROCEDURE — 1159F MED LIST DOCD IN RCRD: CPT | Performed by: NURSE PRACTITIONER

## 2025-03-12 PROCEDURE — 93000 ELECTROCARDIOGRAM COMPLETE: CPT | Performed by: NURSE PRACTITIONER

## 2025-03-12 PROCEDURE — 99214 OFFICE O/P EST MOD 30 MIN: CPT | Performed by: NURSE PRACTITIONER

## 2025-03-12 PROCEDURE — 1160F RVW MEDS BY RX/DR IN RCRD: CPT | Performed by: NURSE PRACTITIONER

## 2025-03-12 NOTE — PROGRESS NOTES
Subjective:     Encounter Date:03/12/2025      Patient ID: Danni Figueroa is a 79 y.o. male.    Chief Complaint: Bradycardia  History of Present Illness  This is a 78 y/o man who follows with Dr. Castañeda and is known to me. 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     He is here today for a follow-up visit.  He has been doing well since he was last seen by Dr. Castañeda in September.  He has been taking midodrine in the morning and evening and his blood pressure has remained stable.  He had 1 episode of dizziness a few months ago that was very brief but otherwise no complaints of lightheadedness or dizziness.  No reported syncope.  He denies any chest pain, shortness of breath palpitations.  Overall he is feeling well.  He plans to go golfing this week.    Prior history:  He was initially seen here in the office by Dr. Castañeda for and EKG performed at his PCP's office showing a heart rate of 48 bpm and inferior lateral borderline T wave changes. He was asymptomatic with his bradycardia. He had no complaints of anginal symptoms warranting any invasive testing for the T wave abnormalities noted on his EKG. He underwent an echocardiogram on 8/10/21 demonstrating an EF of 61-65% with no significant valvular heart disease.    I saw him in March 2022 and he had just experienced a syncopal event at that time. I placed an ambulatory monitor on him that did not show any significant arrhythmias. He then saw Dr. Castañeda in September and was feeling well. No changes were made.     I then saw him in March 2023 and he was still doing well. He was taking PRN fludrocortisone to take for hypotension and he was taking it about every other day. No changes were made and he was to follow up in 6 months.    He was admitted in April 2023 for a syncopal event. He was found to be hypotensive and received fluid  resuscitation. During his hospitalization, he developed a fever and workup revealed an acute UTI with E.Coli. He was treated with IV antibiotics and sent home with oral Levaquin. He was started on midodrine for hypotension and this initially had to be titrated up to 5 mg TID. We then began having issues with hypertension and ultimately he was on 2.5 mg BID at discharge.    When I saw him in follow up in May 2023, he was taking midodrine once a day due to elevated BP. We opted to continue with this as he was doing well. He then saw Dr. Castañeda and continued to feel well.     I have reviewed and updated as appropriate allergies, current medications, past family history, past medical history, past surgical history and problem list.    Review of Systems   Constitutional: Negative for fever, malaise/fatigue, weight gain and weight loss.   HENT:  Negative for congestion, hoarse voice and sore throat.    Eyes:  Negative for blurred vision and double vision.   Cardiovascular:  Negative for chest pain, dyspnea on exertion, leg swelling, orthopnea, palpitations and syncope.   Respiratory:  Negative for cough, shortness of breath and wheezing.    Gastrointestinal:  Negative for abdominal pain, hematemesis, hematochezia and melena.   Genitourinary:  Negative for dysuria and hematuria.   Neurological:  Negative for dizziness, headaches, light-headedness and numbness.   Psychiatric/Behavioral:  Positive for memory loss. Negative for depression. The patient is not nervous/anxious.          Current Outpatient Medications:     B Complex Vitamins (B-COMPLEX/B-12 PO), Take  by mouth., Disp: , Rfl:     donepezil (ARICEPT) 5 MG tablet, Take 1 tablet by mouth Daily., Disp: , Rfl:     Fexofenadine HCl (ALLEGRA ALLERGY PO), Take  by mouth., Disp: , Rfl:     memantine (NAMENDA XR) 28 MG capsule sustained-release 24 hr extended release capsule, Take 1 capsule by mouth Daily., Disp: , Rfl:     midodrine (PROAMATINE) 2.5 MG tablet, Take 1 tablet  by mouth 2 (Two) Times a Day Before Meals for 30 days., Disp: 60 tablet, Rfl: 0    predniSONE (DELTASONE) 1 MG tablet, Take 1 tablet by mouth Daily., Disp: , Rfl:     vitamin D3 125 MCG (5000 UT) capsule capsule, Take 2,000 Units by mouth Daily., Disp: , Rfl:     tadalafil (CIALIS) 5 MG tablet, Take 1 tablet by mouth Daily. (Patient not taking: Reported on 3/12/2025), Disp: , Rfl:     Past Medical History:   Diagnosis Date    Diabetes mellitus     History of alcohol abuse     Hypertension     Low grade mucinous neoplasm of appendix     Peripheral vertigo     Prostate carcinoma        Past Surgical History:   Procedure Laterality Date    APPENDECTOMY      COLON SURGERY  2014    Right hemicolectomy-Pericecal mass    COLONOSCOPY      COLONOSCOPY N/A 2025    Procedure: COLONOSCOPY TO ANASTAMOSIS WITH HOT SNARE POLYPECTOMY;  Surgeon: Lupillo Jones MD;  Location: [facility] ENDOSCOPY;  Service: General;  Laterality: N/A;  PRE: RECTAL BLEEDING  POST: POLYP, hemorrhoids    PROSTATE SURGERY         Family History   Problem Relation Age of Onset    Prostate cancer Brother     Heart failure Brother     Diabetes Other     Heart failure Brother     Heart failure Son        Social History     Tobacco Use    Smoking status: Former     Current packs/day: 0.00     Average packs/day: 1 pack/day for 10.0 years (10.0 ttl pk-yrs)     Types: Cigarettes     Start date:      Quit date:      Years since quittin.2    Smokeless tobacco: Never    Tobacco comments:     caffeine use   Vaping Use    Vaping status: Never Used   Substance Use Topics    Alcohol use: No     Comment: 29+ year former alcoholic    Drug use: No         ECG 12 Lead    Date/Time: 3/12/2025 1:03 PM  Performed by: Tea Knight APRN    Authorized by: Tea Knight APRN  Comparison: compared with previous ECG from 2024  Similar to previous ECG  Rhythm: sinus rhythm             Objective:     Visit Vitals  /72 (BP Location: Right  "arm, Patient Position: Sitting, Cuff Size: Adult)   Pulse 81   Ht 177.8 cm (70\")   BMI 27.68 kg/m²             Physical Exam  Constitutional:       Appearance: Normal appearance. He is normal weight.   HENT:      Head: Normocephalic and atraumatic.   Neck:      Vascular: No carotid bruit.   Cardiovascular:      Rate and Rhythm: Normal rate and regular rhythm.      Chest Wall: PMI is not displaced.      Pulses: Normal pulses.           Radial pulses are 2+ on the right side and 2+ on the left side.        Posterior tibial pulses are 2+ on the right side and 2+ on the left side.      Heart sounds: Normal heart sounds. No murmur heard.     No friction rub. No gallop.   Pulmonary:      Effort: Pulmonary effort is normal.      Breath sounds: Normal breath sounds.   Abdominal:      General: Bowel sounds are normal. There is no distension.      Palpations: Abdomen is soft.   Musculoskeletal:      Right lower leg: No edema.      Left lower leg: No edema.   Skin:     General: Skin is warm and dry.      Capillary Refill: Capillary refill takes less than 2 seconds.   Neurological:      Mental Status: He is alert and oriented to person, place, and time.   Psychiatric:         Mood and Affect: Mood normal.         Behavior: Behavior normal.         Thought Content: Thought content normal.          Lab Review:           Cardiac Procedures:       Assessment:         Diagnoses and all orders for this visit:    1. Orthostatic hypotension (Primary)    2. Bradycardia, sinus    Other orders  -     ECG 12 Lead              Plan:       1. Syncope and collapse secondary to orthostatic hypotension: No recurrent episodes. EKG is stable today. Blood pressure is stable on BID dose of midodrine. Will continue.   2. H/o bradycardia: no further bradycardic events reported. In sinus rhythm on EKG today. No changes.  3.  History of PACs: asymptomatic. Will monitor.    Thank you for allowing me to participate in this patient's care. Please call " with any questions or concerns. Mr. Figueroa will follow up with Dr. Castañeda in 6 months.       Your medication list            Accurate as of March 12, 2025  1:09 PM. If you have any questions, ask your nurse or doctor.                CONTINUE taking these medications        Instructions Last Dose Given Next Dose Due   ALLEGRA ALLERGY PO      Take  by mouth.       B-COMPLEX/B-12 PO      Take  by mouth.       donepezil 5 MG tablet  Commonly known as: ARICEPT      Take 1 tablet by mouth Daily.       memantine 28 MG capsule sustained-release 24 hr extended release capsule  Commonly known as: NAMENDA XR      Take 1 capsule by mouth Daily.       midodrine 2.5 MG tablet  Commonly known as: PROAMATINE      Take 1 tablet by mouth 2 (Two) Times a Day Before Meals for 30 days.       predniSONE 1 MG tablet  Commonly known as: DELTASONE      Take 1 tablet by mouth Daily.       tadalafil 5 MG tablet  Commonly known as: CIALIS      Take 1 tablet by mouth Daily.       vitamin D3 125 MCG (5000 UT) capsule capsule      Take 2,000 Units by mouth Daily.                  SCOT Raya  03/12/25  11:24 AM EDT

## (undated) DEVICE — KT ORCA ORCAPOD DISP STRL

## (undated) DEVICE — LN SMPL CO2 SHTRM SD STREAM W/M LUER

## (undated) DEVICE — SENSR O2 OXIMAX FNGR A/ 18IN NONSTR

## (undated) DEVICE — ADAPT CLN BIOGUARD AIR/H2O DISP

## (undated) DEVICE — TUBING, SUCTION, 1/4" X 10', STRAIGHT: Brand: MEDLINE

## (undated) DEVICE — SNAR POLYP SENSATION STDOVL 27 240 BX40

## (undated) DEVICE — THE SINGLE USE ETRAP – POLYP TRAP IS USED FOR SUCTION RETRIEVAL OF ENDOSCOPICALLY REMOVED POLYPS.: Brand: ETRAP

## (undated) DEVICE — CANN O2 ETCO2 FITS ALL CONN CO2 SMPL A/ 7IN DISP LF

## (undated) DEVICE — PATIENT RETURN ELECTRODE, SINGLE-USE, CONTACT QUALITY MONITORING, ADULT, WITH 9FT CORD, FOR PATIENTS WEIGING OVER 33LBS. (15KG): Brand: MEGADYNE